# Patient Record
Sex: FEMALE | Race: WHITE | Employment: FULL TIME | URBAN - METROPOLITAN AREA
[De-identification: names, ages, dates, MRNs, and addresses within clinical notes are randomized per-mention and may not be internally consistent; named-entity substitution may affect disease eponyms.]

---

## 2017-01-09 ENCOUNTER — TELEPHONE (OUTPATIENT)
Dept: FAMILY MEDICINE CLINIC | Age: 52
End: 2017-01-09

## 2017-01-09 RX ORDER — DIAZEPAM 5 MG/1
5 TABLET ORAL EVERY 6 HOURS PRN
Qty: 30 TABLET | Refills: 1 | Status: SHIPPED | OUTPATIENT
Start: 2017-01-09 | End: 2017-04-11 | Stop reason: SDUPTHER

## 2017-01-24 RX ORDER — HYDROCODONE BITARTRATE AND ACETAMINOPHEN 5; 325 MG/1; MG/1
TABLET ORAL
Qty: 90 TABLET | Refills: 0 | Status: SHIPPED | OUTPATIENT
Start: 2017-01-24 | End: 2017-02-27 | Stop reason: SDUPTHER

## 2017-02-06 RX ORDER — CYCLOBENZAPRINE HCL 10 MG
TABLET ORAL
Qty: 270 TABLET | Refills: 2 | Status: SHIPPED | OUTPATIENT
Start: 2017-02-06 | End: 2017-07-31 | Stop reason: SDUPTHER

## 2017-02-27 RX ORDER — HYDROCODONE BITARTRATE AND ACETAMINOPHEN 5; 325 MG/1; MG/1
TABLET ORAL
Qty: 90 TABLET | Refills: 0 | Status: SHIPPED | OUTPATIENT
Start: 2017-02-27 | End: 2017-03-23 | Stop reason: SDUPTHER

## 2017-03-09 ENCOUNTER — OFFICE VISIT (OUTPATIENT)
Dept: FAMILY MEDICINE CLINIC | Age: 52
End: 2017-03-09

## 2017-03-09 VITALS
RESPIRATION RATE: 20 BRPM | DIASTOLIC BLOOD PRESSURE: 82 MMHG | HEART RATE: 88 BPM | TEMPERATURE: 97.6 F | HEIGHT: 62 IN | SYSTOLIC BLOOD PRESSURE: 128 MMHG

## 2017-03-09 DIAGNOSIS — Z12.31 VISIT FOR SCREENING MAMMOGRAM: ICD-10-CM

## 2017-03-09 DIAGNOSIS — Z12.11 COLON CANCER SCREENING: ICD-10-CM

## 2017-03-09 DIAGNOSIS — Z78.0 POST-MENOPAUSAL: ICD-10-CM

## 2017-03-09 DIAGNOSIS — Z51.81 MEDICATION MONITORING ENCOUNTER: ICD-10-CM

## 2017-03-09 DIAGNOSIS — M25.562 LEFT KNEE PAIN, UNSPECIFIED CHRONICITY: Primary | ICD-10-CM

## 2017-03-09 PROCEDURE — 99213 OFFICE O/P EST LOW 20 MIN: CPT | Performed by: FAMILY MEDICINE

## 2017-03-09 ASSESSMENT — ENCOUNTER SYMPTOMS
SINUS PRESSURE: 0
SHORTNESS OF BREATH: 0
EYE ITCHING: 0
CONSTIPATION: 0
SORE THROAT: 0
DIARRHEA: 0
EYE DISCHARGE: 0
COUGH: 0
ABDOMINAL PAIN: 0

## 2017-03-09 ASSESSMENT — PATIENT HEALTH QUESTIONNAIRE - PHQ9
1. LITTLE INTEREST OR PLEASURE IN DOING THINGS: 0
2. FEELING DOWN, DEPRESSED OR HOPELESS: 0
SUM OF ALL RESPONSES TO PHQ QUESTIONS 1-9: 0
SUM OF ALL RESPONSES TO PHQ9 QUESTIONS 1 & 2: 0

## 2017-03-20 DIAGNOSIS — Z51.81 MEDICATION MONITORING ENCOUNTER: ICD-10-CM

## 2017-03-21 ENCOUNTER — TELEPHONE (OUTPATIENT)
Dept: FAMILY MEDICINE CLINIC | Age: 52
End: 2017-03-21

## 2017-03-23 RX ORDER — HYDROCODONE BITARTRATE AND ACETAMINOPHEN 5; 325 MG/1; MG/1
TABLET ORAL
Qty: 90 TABLET | Refills: 0 | Status: SHIPPED | OUTPATIENT
Start: 2017-03-23 | End: 2017-04-24 | Stop reason: SDUPTHER

## 2017-04-11 RX ORDER — DIAZEPAM 5 MG/1
5 TABLET ORAL EVERY 6 HOURS PRN
Qty: 30 TABLET | Refills: 1 | OUTPATIENT
Start: 2017-04-11 | End: 2017-06-15 | Stop reason: SDUPTHER

## 2017-04-24 RX ORDER — HYDROCODONE BITARTRATE AND ACETAMINOPHEN 5; 325 MG/1; MG/1
TABLET ORAL
Qty: 90 TABLET | Refills: 0 | Status: SHIPPED | OUTPATIENT
Start: 2017-04-24 | End: 2017-05-22 | Stop reason: SDUPTHER

## 2017-05-22 RX ORDER — HYDROCODONE BITARTRATE AND ACETAMINOPHEN 5; 325 MG/1; MG/1
TABLET ORAL
Qty: 90 TABLET | Refills: 0 | Status: SHIPPED | OUTPATIENT
Start: 2017-05-22 | End: 2017-06-19 | Stop reason: SDUPTHER

## 2017-06-15 ENCOUNTER — TELEPHONE (OUTPATIENT)
Dept: FAMILY MEDICINE CLINIC | Age: 52
End: 2017-06-15

## 2017-06-15 RX ORDER — DIAZEPAM 5 MG/1
5 TABLET ORAL EVERY 6 HOURS PRN
Qty: 15 TABLET | Refills: 0 | OUTPATIENT
Start: 2017-06-15 | End: 2017-06-25

## 2017-06-19 RX ORDER — HYDROCODONE BITARTRATE AND ACETAMINOPHEN 5; 325 MG/1; MG/1
TABLET ORAL
Qty: 90 TABLET | Refills: 0 | Status: SHIPPED | OUTPATIENT
Start: 2017-06-19 | End: 2017-07-17 | Stop reason: SDUPTHER

## 2017-06-29 ENCOUNTER — OFFICE VISIT (OUTPATIENT)
Dept: FAMILY MEDICINE CLINIC | Age: 52
End: 2017-06-29

## 2017-06-29 VITALS
TEMPERATURE: 97.5 F | HEART RATE: 110 BPM | HEIGHT: 62 IN | WEIGHT: 185 LBS | BODY MASS INDEX: 34.04 KG/M2 | SYSTOLIC BLOOD PRESSURE: 128 MMHG | DIASTOLIC BLOOD PRESSURE: 82 MMHG

## 2017-06-29 DIAGNOSIS — M25.562 LEFT KNEE PAIN, UNSPECIFIED CHRONICITY: Primary | ICD-10-CM

## 2017-06-29 DIAGNOSIS — F41.9 ANXIETY: ICD-10-CM

## 2017-06-29 DIAGNOSIS — Z51.81 MEDICATION MONITORING ENCOUNTER: ICD-10-CM

## 2017-06-29 LAB
AMPHETAMINE SCREEN, URINE: NORMAL
BARBITURATE SCREEN, URINE: NORMAL
BENZODIAZEPINE SCREEN, URINE: NORMAL
CANNABINOID SCREEN URINE: NORMAL
COCAINE METABOLITE, URINE: NORMAL
MDMA URINE: NORMAL
METHADONE SCREEN, URINE: NORMAL
METHAMPHETAMINE, URINE: NORMAL
OPIATES, URINE: NORMAL
OXYCODONE: NORMAL
PCP: NORMAL
PHENCYCLIDINE, URINE: NORMAL
PROPOXYPHENE, URINE: NORMAL
TRICYCLIC ANTIDEPRESSANTS, UR: NORMAL

## 2017-06-29 PROCEDURE — 99213 OFFICE O/P EST LOW 20 MIN: CPT | Performed by: FAMILY MEDICINE

## 2017-06-29 RX ORDER — DIAZEPAM 5 MG/1
5 TABLET ORAL
Refills: 0 | COMMUNITY
Start: 2017-06-16 | End: 2018-07-19 | Stop reason: ALTCHOICE

## 2017-06-29 ASSESSMENT — ENCOUNTER SYMPTOMS
SHORTNESS OF BREATH: 0
CONSTIPATION: 0
SINUS PRESSURE: 0
EYE ITCHING: 0
SORE THROAT: 0
ABDOMINAL PAIN: 0
DIARRHEA: 0
EYE DISCHARGE: 0
COUGH: 0

## 2017-07-06 RX ORDER — DIAZEPAM 5 MG/1
5 TABLET ORAL EVERY 6 HOURS PRN
Qty: 30 TABLET | Refills: 0 | OUTPATIENT
Start: 2017-07-06 | End: 2017-07-16

## 2017-07-07 ENCOUNTER — TELEPHONE (OUTPATIENT)
Dept: FAMILY MEDICINE CLINIC | Age: 52
End: 2017-07-07

## 2017-07-07 DIAGNOSIS — Z51.81 THERAPEUTIC DRUG MONITORING: Primary | ICD-10-CM

## 2017-07-07 DIAGNOSIS — F41.9 ANXIETY: ICD-10-CM

## 2017-07-17 RX ORDER — HYDROCODONE BITARTRATE AND ACETAMINOPHEN 5; 325 MG/1; MG/1
TABLET ORAL
Qty: 90 TABLET | Refills: 0 | Status: SHIPPED | OUTPATIENT
Start: 2017-07-17 | End: 2017-08-14 | Stop reason: SDUPTHER

## 2017-08-01 RX ORDER — CYCLOBENZAPRINE HCL 10 MG
TABLET ORAL
Qty: 270 TABLET | Refills: 0 | Status: SHIPPED | OUTPATIENT
Start: 2017-08-01 | End: 2017-11-06 | Stop reason: SDUPTHER

## 2017-08-14 RX ORDER — HYDROCODONE BITARTRATE AND ACETAMINOPHEN 5; 325 MG/1; MG/1
TABLET ORAL
Qty: 90 TABLET | Refills: 0 | Status: SHIPPED | OUTPATIENT
Start: 2017-08-14 | End: 2017-09-11 | Stop reason: SDUPTHER

## 2017-09-11 RX ORDER — HYDROCODONE BITARTRATE AND ACETAMINOPHEN 5; 325 MG/1; MG/1
TABLET ORAL
Qty: 90 TABLET | Refills: 0 | Status: SHIPPED | OUTPATIENT
Start: 2017-09-11 | End: 2017-10-11 | Stop reason: SDUPTHER

## 2017-10-11 ENCOUNTER — TELEPHONE (OUTPATIENT)
Dept: FAMILY MEDICINE CLINIC | Age: 52
End: 2017-10-11

## 2017-10-11 RX ORDER — HYDROCODONE BITARTRATE AND ACETAMINOPHEN 5; 325 MG/1; MG/1
TABLET ORAL
Qty: 24 TABLET | Refills: 0 | Status: SHIPPED | OUTPATIENT
Start: 2017-10-11 | End: 2017-10-19 | Stop reason: SDUPTHER

## 2017-10-19 ENCOUNTER — OFFICE VISIT (OUTPATIENT)
Dept: FAMILY MEDICINE CLINIC | Age: 52
End: 2017-10-19

## 2017-10-19 VITALS
HEIGHT: 61 IN | BODY MASS INDEX: 34.93 KG/M2 | SYSTOLIC BLOOD PRESSURE: 128 MMHG | DIASTOLIC BLOOD PRESSURE: 80 MMHG | RESPIRATION RATE: 16 BRPM | TEMPERATURE: 97.9 F | HEART RATE: 76 BPM | WEIGHT: 185 LBS

## 2017-10-19 DIAGNOSIS — M25.562 LEFT KNEE PAIN, UNSPECIFIED CHRONICITY: Primary | ICD-10-CM

## 2017-10-19 DIAGNOSIS — Z23 NEED FOR INFLUENZA VACCINATION: ICD-10-CM

## 2017-10-19 PROCEDURE — 90688 IIV4 VACCINE SPLT 0.5 ML IM: CPT | Performed by: FAMILY MEDICINE

## 2017-10-19 PROCEDURE — 99213 OFFICE O/P EST LOW 20 MIN: CPT | Performed by: FAMILY MEDICINE

## 2017-10-19 PROCEDURE — 90471 IMMUNIZATION ADMIN: CPT | Performed by: FAMILY MEDICINE

## 2017-10-19 RX ORDER — HYDROCODONE BITARTRATE AND ACETAMINOPHEN 5; 325 MG/1; MG/1
TABLET ORAL
Qty: 90 TABLET | Refills: 0 | Status: SHIPPED | OUTPATIENT
Start: 2017-10-19 | End: 2017-11-16 | Stop reason: SDUPTHER

## 2017-10-19 ASSESSMENT — ENCOUNTER SYMPTOMS
SINUS PRESSURE: 0
DIARRHEA: 0
BLOOD IN STOOL: 0
ABDOMINAL PAIN: 0
CONSTIPATION: 0
COUGH: 0
RHINORRHEA: 0
BACK PAIN: 0
WHEEZING: 0
SHORTNESS OF BREATH: 0
CHEST TIGHTNESS: 0

## 2017-10-19 NOTE — PROGRESS NOTES
Prescriptions   Medication Sig Dispense Refill    HYDROcodone-acetaminophen (NORCO) 5-325 MG per tablet 1 tablet every 8 hours as needed for knee pain. Earliest Fill Date: 10/11/17 24 tablet 0    cyclobenzaprine (FLEXERIL) 10 MG tablet TAKE 1 TABLET BY MOUTH 3 TIMES DAILY AS NEEDED FOR MUSCLE SPASMS 270 tablet 0    diazepam (VALIUM) 5 MG tablet Take 5 mg by mouth  0    fluticasone (FLONASE) 50 MCG/ACT nasal spray 1 spray by Nasal route daily 3 Bottle 2    albuterol sulfate  (90 BASE) MCG/ACT inhaler Inhale 2 puffs into the lungs every 4 hours as needed for Wheezing 3 Inhaler 2    budesonide-formoterol (SYMBICORT) 80-4.5 MCG/ACT AERO Inhale 2 puffs into the lungs 2 times daily 3 Inhaler 2    Multiple Vitamin TABS Take 1 tablet by mouth daily. No current facility-administered medications for this visit. PMH, Surgical Hx, Family Hx, and Social Hx reviewed and updated. Health Maintenance reviewed. Objective    Vitals:    10/19/17 1658   BP: 128/80   Pulse: 76   Resp: 16   Temp: 97.9 °F (36.6 °C)   TempSrc: Temporal   Weight: 185 lb (83.9 kg)   Height: 5' 1\" (1.549 m)       Physical Exam   Constitutional: She is oriented to person, place, and time. She appears well-developed and well-nourished. HENT:   Head: Normocephalic. Mouth/Throat: Oropharynx is clear and moist.   Eyes: Pupils are equal, round, and reactive to light. Neck: Normal range of motion. Neck supple. No thyromegaly present. Cardiovascular: Normal rate and intact distal pulses. Exam reveals no gallop and no friction rub. No murmur heard. Pulmonary/Chest: Effort normal and breath sounds normal.   Abdominal: Soft. Bowel sounds are normal.   Musculoskeletal: Normal range of motion. Neurological: She is alert and oriented to person, place, and time. Skin: Skin is warm and dry. Psychiatric: She has a normal mood and affect.  Her behavior is normal.   Started a new job , wanted to try to get the knee replacement done for the end of the year but now may have to wait to Spring so follows up with her orthopedist in a month's period also requests flu shot today    Assessment & Plan   1. Left knee pain, unspecified chronicity  HYDROcodone-acetaminophen (NORCO) 5-325 MG per tablet   2. Need for influenza vaccination  INFLUENZA, QUADV, 3 YRS AND OLDER, IM, MDV, 0.5ML (FLUZONE QUADV)     Orders Placed This Encounter   Procedures    INFLUENZA, QUADV, 3 YRS AND OLDER, IM, MDV, 0.5ML (FLUZONE QUADV)     Orders Placed This Encounter   Medications    HYDROcodone-acetaminophen (NORCO) 5-325 MG per tablet     Si tablet every 8 hours as needed for knee pain. Dispense:  90 tablet     Refill:  0     Medications Discontinued During This Encounter   Medication Reason    HYDROcodone-acetaminophen ([address]) 5-325 MG per tablet Reorder     No Follow-up on file.         Controlled Substances Monitoring:          Santosh King MD

## 2017-11-06 RX ORDER — CYCLOBENZAPRINE HCL 10 MG
TABLET ORAL
Qty: 270 TABLET | Refills: 0 | Status: SHIPPED | OUTPATIENT
Start: 2017-11-06 | End: 2018-02-04 | Stop reason: SDUPTHER

## 2017-11-09 RX ORDER — FLUTICASONE PROPIONATE 50 MCG
1 SPRAY, SUSPENSION (ML) NASAL DAILY
Qty: 3 BOTTLE | Refills: 2 | Status: SHIPPED | OUTPATIENT
Start: 2017-11-09 | End: 2018-11-09 | Stop reason: SDUPTHER

## 2017-11-09 NOTE — TELEPHONE ENCOUNTER
From: Julián Portillo  Sent: 11/8/2017 8:10 PM EST  Subject: Medication Renewal Request    Jonathon Lawson would like a refill of the following medications:  fluticasone (FLONASE) 50 MCG/ACT nasal spray Unknown MD Urszula]    Preferred pharmacy: [address] [facility] [phone]    Comment:

## 2017-11-16 DIAGNOSIS — M25.562 LEFT KNEE PAIN, UNSPECIFIED CHRONICITY: ICD-10-CM

## 2017-11-16 RX ORDER — HYDROCODONE BITARTRATE AND ACETAMINOPHEN 5; 325 MG/1; MG/1
TABLET ORAL
Qty: 90 TABLET | Refills: 0 | Status: SHIPPED | OUTPATIENT
Start: 2017-11-16 | End: 2017-12-14 | Stop reason: SDUPTHER

## 2017-11-16 NOTE — TELEPHONE ENCOUNTER
From: Timothy Tejada  Sent: 11/15/2017 8:55 PM EST  Subject: Medication Renewal Request    Marty [facility] would like a refill of the following medications:  HYDROcodone-acetaminophen (Elyn Barley) 5-325 MG per tablet Cheryle Mis, MD]    Preferred pharmacy: [place] [address], [address] [facility] [phone]    Comment:  Hi, is there any possible way that this can be called in? I have a crazy busy schedule this week and won't be getting back to this side of town until late in the evening. It would be most appreciated. Thank you. Have a Happy Thanksgiving.

## 2017-12-14 DIAGNOSIS — M25.562 LEFT KNEE PAIN, UNSPECIFIED CHRONICITY: ICD-10-CM

## 2017-12-14 RX ORDER — HYDROCODONE BITARTRATE AND ACETAMINOPHEN 5; 325 MG/1; MG/1
TABLET ORAL
Qty: 90 TABLET | Refills: 0 | Status: SHIPPED | OUTPATIENT
Start: 2017-12-14 | End: 2018-01-10 | Stop reason: SDUPTHER

## 2017-12-28 ENCOUNTER — TELEPHONE (OUTPATIENT)
Dept: FAMILY MEDICINE CLINIC | Age: 52
End: 2017-12-28

## 2017-12-28 ENCOUNTER — PATIENT MESSAGE (OUTPATIENT)
Dept: FAMILY MEDICINE CLINIC | Age: 52
End: 2017-12-28

## 2017-12-28 RX ORDER — BENZONATATE 200 MG/1
200 CAPSULE ORAL 3 TIMES DAILY PRN
Qty: 30 CAPSULE | Refills: 1 | Status: SHIPPED | OUTPATIENT
Start: 2017-12-28 | End: 2017-12-29 | Stop reason: SDUPTHER

## 2017-12-28 RX ORDER — AZITHROMYCIN 250 MG/1
TABLET, FILM COATED ORAL
Qty: 1 PACKET | Refills: 0 | Status: SHIPPED | OUTPATIENT
Start: 2017-12-28 | End: 2017-12-29 | Stop reason: SDUPTHER

## 2017-12-29 ENCOUNTER — PATIENT MESSAGE (OUTPATIENT)
Dept: FAMILY MEDICINE CLINIC | Age: 52
End: 2017-12-29

## 2017-12-29 RX ORDER — AZITHROMYCIN 250 MG/1
TABLET, FILM COATED ORAL
Qty: 1 PACKET | Refills: 0 | Status: SHIPPED | OUTPATIENT
Start: 2017-12-29 | End: 2018-05-18 | Stop reason: SDUPTHER

## 2017-12-29 RX ORDER — BENZONATATE 200 MG/1
200 CAPSULE ORAL 3 TIMES DAILY PRN
Qty: 30 CAPSULE | Refills: 1 | Status: SHIPPED | OUTPATIENT
Start: 2017-12-29 | End: 2018-01-05

## 2017-12-29 NOTE — TELEPHONE ENCOUNTER
From: Griffin Nolasco  To: Rashad Chamberlain MD  Sent: 12/29/2017 7:25 AM EST  Subject: Prescription Question    I checked online this morning for the prescription at [facility] and there still is nothing. Can you tell me if he is was calling in something for my cough as well? I was up most of the night coughing, I am afraid it is going to start affecting my asthma. Thanks.  ----- Message -----  From: Alicia Aguayo  Sent: 12/28/2017 5:34 PM EST  To: Russ Hardy Faith Beverage  Subject: RE: Prescription Question  Letting you know Dr. Tim No your prescription to your pharmacy     ----- Message -----   From: Russ Funk. Faith Beverage   Sent: 12/28/2017 2:56 PM EST   To: Rashad Chamberlain MD  Subject: Prescription Question    Hi, I have a bad case of laryngitis, is there an antibiotic the doctor can prescribe? Also I am starting to get a cough, mostly during the night and it is getting bothersome, I know Doctor prescribed me a cough medicine a while back, Wondered if I could get that again.  Thanks

## 2018-01-10 DIAGNOSIS — M25.562 LEFT KNEE PAIN, UNSPECIFIED CHRONICITY: ICD-10-CM

## 2018-01-10 RX ORDER — HYDROCODONE BITARTRATE AND ACETAMINOPHEN 5; 325 MG/1; MG/1
TABLET ORAL
Qty: 90 TABLET | Refills: 0 | Status: SHIPPED | OUTPATIENT
Start: 2018-01-10 | End: 2018-02-08 | Stop reason: SDUPTHER

## 2018-01-26 ENCOUNTER — TELEPHONE (OUTPATIENT)
Dept: FAMILY MEDICINE CLINIC | Age: 53
End: 2018-01-26

## 2018-02-05 RX ORDER — CYCLOBENZAPRINE HCL 10 MG
TABLET ORAL
Qty: 270 TABLET | Refills: 0 | Status: SHIPPED | OUTPATIENT
Start: 2018-02-05 | End: 2018-10-28 | Stop reason: SDUPTHER

## 2018-02-08 DIAGNOSIS — M25.562 LEFT KNEE PAIN, UNSPECIFIED CHRONICITY: ICD-10-CM

## 2018-02-08 RX ORDER — HYDROCODONE BITARTRATE AND ACETAMINOPHEN 5; 325 MG/1; MG/1
TABLET ORAL
Qty: 90 TABLET | Refills: 0 | Status: SHIPPED | OUTPATIENT
Start: 2018-02-08 | End: 2018-03-01 | Stop reason: SDUPTHER

## 2018-02-08 NOTE — TELEPHONE ENCOUNTER
Last seen 10/2017  Last filled 01/11/18  Last drug screen   Has appt scheduled for 03/01 is ok for one more refill

## 2018-03-01 ENCOUNTER — OFFICE VISIT (OUTPATIENT)
Dept: FAMILY MEDICINE CLINIC | Age: 53
End: 2018-03-01
Payer: COMMERCIAL

## 2018-03-01 VITALS
WEIGHT: 187 LBS | TEMPERATURE: 97.4 F | BODY MASS INDEX: 34.41 KG/M2 | DIASTOLIC BLOOD PRESSURE: 82 MMHG | HEIGHT: 62 IN | SYSTOLIC BLOOD PRESSURE: 138 MMHG | HEART RATE: 112 BPM | RESPIRATION RATE: 20 BRPM

## 2018-03-01 DIAGNOSIS — M25.562 LEFT KNEE PAIN, UNSPECIFIED CHRONICITY: Primary | ICD-10-CM

## 2018-03-01 DIAGNOSIS — Z12.31 VISIT FOR SCREENING MAMMOGRAM: ICD-10-CM

## 2018-03-01 PROCEDURE — 99213 OFFICE O/P EST LOW 20 MIN: CPT | Performed by: FAMILY MEDICINE

## 2018-03-01 RX ORDER — HYDROCODONE BITARTRATE AND ACETAMINOPHEN 5; 325 MG/1; MG/1
TABLET ORAL
Qty: 90 TABLET | Refills: 0 | Status: SHIPPED | OUTPATIENT
Start: 2018-03-01 | End: 2018-03-01 | Stop reason: SDUPTHER

## 2018-03-01 RX ORDER — HYDROCODONE BITARTRATE AND ACETAMINOPHEN 5; 325 MG/1; MG/1
TABLET ORAL
Qty: 90 TABLET | Refills: 0 | Status: SHIPPED | OUTPATIENT
Start: 2018-03-01 | End: 2018-04-04 | Stop reason: SDUPTHER

## 2018-03-01 ASSESSMENT — ENCOUNTER SYMPTOMS
ABDOMINAL PAIN: 0
SHORTNESS OF BREATH: 0
SORE THROAT: 0
DIARRHEA: 0
EYE ITCHING: 0
EYE DISCHARGE: 0
COUGH: 0
SINUS PRESSURE: 0
CONSTIPATION: 0

## 2018-03-01 NOTE — LETTER
stimulants, such as caffeine pills or energy drinks, certain weight loss supplements and oral decongestants. Dependence withdrawal symptoms may include depressed mood, loss of interest, suicidal thoughts, anxiety, fatigue, appetite changes and agitation. Testosterone replacement therapy:  Potential side effects include increased risk of stroke and heart attack, blood clots, increased blood pressure, increased cholesterol, enlarged prostate, sleep apnea, irritability/aggression and other mood disorders, and decreased fertility. Other:     1. I understand that I have the following responsibilities:  · I will take medications at the dose and frequency prescribed. · I will not increase or change how I take my medications without the approval of the health care provider who signs this Medication Agreement. · I will arrange for refills at the prescribed interval ONLY during regular office hours. I will not ask for refills earlier than agreed, after-hours, on holidays or on weekends. · I will obtain all refills for these medications at  ·  ____________[facility] [address] [place] [address], [address] [place] [address] [phone]________________________  pharmacy (phone number  ·  ________________________), with full consent for my provider and pharmacist to exchange information in writing or verbally. · I will not request any pain medications or controlled substances from other providers and will inform this provider of all other medications I am taking. · I will inform my other health care providers that I am taking these medications and of the existence of this [facility]. In the event of an emergency, I will provide the same information to the emergency department providers. · I will protect my prescriptions and medications. I understand that lost or misplaced prescriptions will not be replaced. · I develop rapid tolerance or loss of improvement, as described in my treatment plan. · I develop significant side effects from the medication. · My behavior is inconsistent with the responsibilities outlined above, which may also result in my being prevented from receiving further care from this office. · Other:____________________________________________________________________    AGREEMENT:    I have read the above and have had all of my questions answered. For chronic disease management, I know that my symptoms can be managed with many types of treatments. A chronic medication trial may be part of my treatment, but I must be an active participant in my care. Medication therapy is only one part of my symptom management plan. In some cases, there may be limited scientific evidence to support the chronic use of certain medications to improve symptoms and daily function. Furthermore, in certain circumstances, there may be scientific information that suggests that use of chronic controlled substances may actually worsen my symptoms and increase my risk of unintentional death directly related to this medication therapy. I know that if my provider feels my risk from controlled medications is greater than my benefit, I will have my controlled substance medication(s) compassionately lowered or removed altogether. I agree to a controlled substance medication trial.      I further agree to allow this office to contact family or friends if there are concerns about my safety and use of the controlled medications. I have agreed to use the following medications above as instructed by my physician and as stated in this [place] [address].      Patient Signature:  ______________________  [facility]:6/8/1184 or _____________    Provider Signature:______________________  [facility]:1/5/2934 or _____________

## 2018-03-01 NOTE — PROGRESS NOTES
Subjective  Kaleva Patience, 45 y.o. female presents today with:  Chief Complaint   Patient presents with    Knee Pain     follow up left knee pain. She is taking Norco- no negative side effects reported. She is requesting a refill on this medication           HPI    Patient in for follow-up chronic left knee pain needs pain medication refill. Pt is stable on current pain control treatment plan with no issues or side effects. Pt understands the risks associated with chronic opioid use and possibility of dependence. Pt understands to use pain medication only as directed and minimize it's use when possible  Current treatment plan provides adaquate pain management control in order to maintain a stable functional daily life. Pt has failed rest, ice, heat, acetaminophen- and Ibuprofen, muscle relaxant- , oral steroid- Prednisone , topical agent- Icy hot patches and Wan atkinson, physical therapy, home exercises      No other questions and or concerns for today's visit      Review of Systems   Constitutional: Negative for appetite change, fatigue and fever. HENT: Negative for congestion, ear pain, sinus pressure and sore throat. Eyes: Negative for discharge and itching. Respiratory: Negative for cough and shortness of breath. Cardiovascular: Negative for chest pain and palpitations. Gastrointestinal: Negative for abdominal pain, constipation and diarrhea. Endocrine: Negative for polydipsia. Genitourinary: Negative for difficulty urinating. Musculoskeletal: Positive for arthralgias and myalgias. Negative for gait problem. Skin: Negative. Neurological: Negative for dizziness. Hematological: Negative. Psychiatric/Behavioral: Negative.           Past Medical History:   Diagnosis Date    Anxiety 6/29/2017    Asthma 1/26/2015    Hx of blood clots 1/26/2015    Left knee pain- treated by Orthopedic Dr. Radha Devlin 1/26/2015    Post-menopausal 1/26/2015    SVT (supraventricular tachycardia) ([address] 1/26/2015     History reviewed. No pertinent surgical history. Social History     Social History    Marital status:      Spouse name: Allen Siddiqi    Number of children: 1    Years of education: N/A     Occupational History   [employer]     Social History Main Topics    Smoking status: Never Smoker    Smokeless tobacco: Never Used    Alcohol use No    Drug use: No    Sexual activity: Not on file     Other Topics Concern    Not on file     Social History Narrative    No narrative on file     History reviewed. No pertinent family history. Allergies   Allergen Reactions    Amoxicillin Rash     Current Outpatient Prescriptions   Medication Sig Dispense Refill    HYDROcodone-acetaminophen (NORCO) 5-325 MG per tablet 1 tablet every 8 hours as needed for knee pain. Earliest Fill Date: 2/8/18 90 tablet 0    cyclobenzaprine (FLEXERIL) 10 MG tablet TAKE 1 TABLET BY MOUTH THREE TIMES DAILY AS NEEDED FOR MUSCLE SPASMS 270 tablet 0    fluticasone (FLONASE) 50 MCG/ACT nasal spray 1 spray by Nasal route daily 3 Bottle 2    diazepam (VALIUM) 5 MG tablet Take 5 mg by mouth  0    albuterol sulfate  (90 BASE) MCG/ACT inhaler Inhale 2 puffs into the lungs every 4 hours as needed for Wheezing 3 Inhaler 2    budesonide-formoterol (SYMBICORT) 80-4.5 MCG/ACT AERO Inhale 2 puffs into the lungs 2 times daily 3 Inhaler 2    Multiple Vitamin TABS Take 1 tablet by mouth daily. No current facility-administered medications for this visit. PMH, Surgical Hx, Family Hx, and Social Hx reviewed and updated. Health Maintenance reviewed. Objective    Vitals:    03/01/18 1555   BP: 138/82   Pulse: 112   Resp: 20   Temp: 97.4 °F (36.3 °C)   TempSrc: Temporal   Weight: 187 lb (84.8 kg)   Height: 5' 1.5\" (1.562 m)       Physical Exam   Constitutional: She is oriented to person, place, and time. She appears well-developed and well-nourished.    HENT:   Head:

## 2018-03-07 ENCOUNTER — TELEPHONE (OUTPATIENT)
Dept: FAMILY MEDICINE CLINIC | Age: 53
End: 2018-03-07

## 2018-04-04 DIAGNOSIS — M25.562 LEFT KNEE PAIN, UNSPECIFIED CHRONICITY: ICD-10-CM

## 2018-04-04 RX ORDER — HYDROCODONE BITARTRATE AND ACETAMINOPHEN 5; 325 MG/1; MG/1
TABLET ORAL
Qty: 90 TABLET | Refills: 0 | Status: SHIPPED | OUTPATIENT
Start: 2018-04-04 | End: 2018-05-02 | Stop reason: SDUPTHER

## 2018-05-02 DIAGNOSIS — M25.562 LEFT KNEE PAIN, UNSPECIFIED CHRONICITY: ICD-10-CM

## 2018-05-02 RX ORDER — HYDROCODONE BITARTRATE AND ACETAMINOPHEN 5; 325 MG/1; MG/1
TABLET ORAL
Qty: 90 TABLET | Refills: 0 | Status: SHIPPED | OUTPATIENT
Start: 2018-05-02 | End: 2018-05-29 | Stop reason: SDUPTHER

## 2018-05-18 ENCOUNTER — TELEPHONE (OUTPATIENT)
Dept: FAMILY MEDICINE CLINIC | Age: 53
End: 2018-05-18

## 2018-05-18 RX ORDER — AZITHROMYCIN 250 MG/1
TABLET, FILM COATED ORAL
Qty: 1 PACKET | Refills: 0 | Status: SHIPPED | OUTPATIENT
Start: 2018-05-18 | End: 2018-05-28

## 2018-05-29 ENCOUNTER — TELEPHONE (OUTPATIENT)
Dept: FAMILY MEDICINE CLINIC | Age: 53
End: 2018-05-29

## 2018-05-29 DIAGNOSIS — M25.562 LEFT KNEE PAIN, UNSPECIFIED CHRONICITY: ICD-10-CM

## 2018-05-29 RX ORDER — HYDROCODONE BITARTRATE AND ACETAMINOPHEN 5; 325 MG/1; MG/1
TABLET ORAL
Qty: 90 TABLET | Refills: 0 | Status: SHIPPED | OUTPATIENT
Start: 2018-05-29 | End: 2018-06-26 | Stop reason: SDUPTHER

## 2018-06-26 ENCOUNTER — TELEPHONE (OUTPATIENT)
Dept: FAMILY MEDICINE CLINIC | Age: 53
End: 2018-06-26

## 2018-06-26 DIAGNOSIS — M25.562 LEFT KNEE PAIN, UNSPECIFIED CHRONICITY: ICD-10-CM

## 2018-06-26 RX ORDER — HYDROCODONE BITARTRATE AND ACETAMINOPHEN 5; 325 MG/1; MG/1
TABLET ORAL
Qty: 90 TABLET | Refills: 0 | Status: SHIPPED | OUTPATIENT
Start: 2018-06-26 | End: 2018-06-26 | Stop reason: SDUPTHER

## 2018-06-26 RX ORDER — HYDROCODONE BITARTRATE AND ACETAMINOPHEN 5; 325 MG/1; MG/1
TABLET ORAL
Qty: 90 TABLET | Refills: 0 | Status: SHIPPED | OUTPATIENT
Start: 2018-06-26 | End: 2018-07-19

## 2018-07-19 ENCOUNTER — OFFICE VISIT (OUTPATIENT)
Dept: FAMILY MEDICINE CLINIC | Age: 53
End: 2018-07-19
Payer: COMMERCIAL

## 2018-07-19 VITALS
DIASTOLIC BLOOD PRESSURE: 86 MMHG | TEMPERATURE: 98.6 F | RESPIRATION RATE: 16 BRPM | HEART RATE: 110 BPM | SYSTOLIC BLOOD PRESSURE: 144 MMHG | HEIGHT: 62 IN

## 2018-07-19 DIAGNOSIS — I47.1 SVT (SUPRAVENTRICULAR TACHYCARDIA) (HCC): ICD-10-CM

## 2018-07-19 DIAGNOSIS — M25.562 LEFT KNEE PAIN, UNSPECIFIED CHRONICITY: Primary | ICD-10-CM

## 2018-07-19 DIAGNOSIS — Z00.00 ROUTINE GENERAL MEDICAL EXAMINATION AT A HEALTH CARE FACILITY: ICD-10-CM

## 2018-07-19 DIAGNOSIS — F11.90 CHRONIC, CONTINUOUS USE OF OPIOIDS: ICD-10-CM

## 2018-07-19 DIAGNOSIS — F41.9 ANXIETY: ICD-10-CM

## 2018-07-19 PROCEDURE — 99214 OFFICE O/P EST MOD 30 MIN: CPT | Performed by: FAMILY MEDICINE

## 2018-07-19 RX ORDER — HYDROCODONE BITARTRATE AND ACETAMINOPHEN 7.5; 325 MG/1; MG/1
1 TABLET ORAL EVERY 8 HOURS PRN
Qty: 90 TABLET | Refills: 0 | Status: SHIPPED | OUTPATIENT
Start: 2018-07-19 | End: 2018-08-16 | Stop reason: SDUPTHER

## 2018-07-19 RX ORDER — HYDROCODONE BITARTRATE AND ACETAMINOPHEN 5; 325 MG/1; MG/1
TABLET ORAL
Qty: 90 TABLET | Refills: 0 | Status: CANCELLED | OUTPATIENT
Start: 2018-07-19 | End: 2018-08-18

## 2018-07-19 ASSESSMENT — ENCOUNTER SYMPTOMS
ABDOMINAL PAIN: 0
CONSTIPATION: 0
EYES NEGATIVE: 1
DIARRHEA: 0
COUGH: 0
NAUSEA: 0
SHORTNESS OF BREATH: 0

## 2018-07-19 ASSESSMENT — PATIENT HEALTH QUESTIONNAIRE - PHQ9
SUM OF ALL RESPONSES TO PHQ9 QUESTIONS 1 & 2: 0
2. FEELING DOWN, DEPRESSED OR HOPELESS: 0
1. LITTLE INTEREST OR PLEASURE IN DOING THINGS: 0
SUM OF ALL RESPONSES TO PHQ QUESTIONS 1-9: 0

## 2018-07-19 NOTE — PROGRESS NOTES
Subjective  Parminder Zuniga, 45 y.o. female presents today with:  Chief Complaint   Patient presents with    Knee Pain     Patient presents today for a follow-up on Knee Pain. Is taking Norco. States she has no concerns with this medication. HPI    Patient in for follow-up chronic knee pain needs medication refill. No other questions and or concerns for today's visit      Review of Systems   Constitutional: Negative for activity change, appetite change, fatigue and fever. HENT: Negative for ear pain. Eyes: Negative. Respiratory: Negative for cough and shortness of breath. Cardiovascular: Negative for chest pain and palpitations. Gastrointestinal: Negative for abdominal pain, constipation, diarrhea and nausea. Genitourinary: Negative for dysuria and frequency. Neurological: Negative for dizziness and headaches. Past Medical History:   Diagnosis Date    Anxiety 6/29/2017    Asthma 1/26/2015    Hx of blood clots 1/26/2015    Left knee pain- treated by Orthopedic Dr. Yolanda Ramos 1/26/2015    Post-menopausal 1/26/2015    SVT ([address]raventricular tachy[facility]) [address] 1/26/2015     No past surgical history on file. Social History     Social History    Marital status:      Spouse name: Julieta Painting    Number of children: 1    Years of education: N/A     Occupational History   [employer]     Social History Main Topics    Smoking status: Never Smoker    Smokeless tobacco: Never Used    Alcohol use No    Drug use: No    Sexual activity: Not on file     Other Topics Concern    Not on file     Social History Narrative    No narrative on file     No family history on file. Allergies   Allergen Reactions    Amoxicillin Rash     Current Outpatient Prescriptions   Medication Sig Dispense Refill    HYDROcodone-acetaminophen (NORCO) 5-325 MG per tablet 1 tablet every 8 hours as needed for knee pain.  90 tablet 0    cyclobenzaprine (FLEXERIL) 10 MG tablet TAKE 1 TABLET BY MOUTH THREE TIMES DAILY AS NEEDED FOR MUSCLE SPASMS 270 tablet 0    fluticasone (FLONASE) 50 MCG/ACT nasal spray 1 spray by Nasal route daily 3 Bottle 2    albuterol sulfate  (90 BASE) MCG/ACT inhaler Inhale 2 puffs into the lungs every 4 hours as needed for Wheezing 3 Inhaler 2    budesonide-formoterol (SYMBICORT) 80-4.5 MCG/ACT AERO Inhale 2 puffs into the lungs 2 times daily 3 Inhaler 2    Multiple Vitamin TABS Take 1 tablet by mouth daily. No current facility-administered medications for this visit. PMH, Surgical Hx, Family Hx, and Social Hx reviewed and updated. Health Maintenance reviewed. Objective    Vitals:    07/19/18 1831   BP: 130/86   Pulse: 110   Resp: 16   Temp: 98.6 °F (37 °C)   TempSrc: Temporal   Height: 5' 1.5\" (1.562 m)       Physical Exam   Constitutional: She is oriented to person, place, and time. She appears well-developed and well-nourished. HENT:   Head: Normocephalic. Mouth/Throat: Oropharynx is clear and moist.   Eyes: Pupils are equal, round, and reactive to light. Neck: Normal range of motion. Neck supple. No thyromegaly present. Cardiovascular: Normal rate and intact distal pulses. Exam reveals no gallop and no friction rub. No murmur heard. Pulmonary/Chest: Effort normal and breath sounds normal.   Abdominal: Soft. Bowel sounds are normal.   Musculoskeletal: Normal range of motion. Neurological: She is alert and oriented to person, place, and time. Skin: Skin is warm and dry. Psychiatric: She has a normal mood and affect. Her behavior is normal.     Chronic left knee pain stable history of SVT and anxiety is uncontrolled. Needs a urine drug screen none today. We'll do that needed some blood drawn  Assessment & Plan    Diagnosis Orders   1. Left knee pain, unspecified chronicity  HYDROcodone-acetaminophen (NORCO) 7.5-325 MG per tablet   2. Anxiety     3.  SVT (supraventricular tachycardia) ([address]

## 2018-08-06 DIAGNOSIS — F11.90 CHRONIC, CONTINUOUS USE OF OPIOIDS: ICD-10-CM

## 2018-08-16 DIAGNOSIS — M25.562 LEFT KNEE PAIN, UNSPECIFIED CHRONICITY: ICD-10-CM

## 2018-08-16 RX ORDER — HYDROCODONE BITARTRATE AND ACETAMINOPHEN 7.5; 325 MG/1; MG/1
1 TABLET ORAL EVERY 8 HOURS PRN
Qty: 90 TABLET | Refills: 0 | Status: SHIPPED | OUTPATIENT
Start: 2018-08-16 | End: 2018-09-13 | Stop reason: SDUPTHER

## 2018-08-16 NOTE — TELEPHONE ENCOUNTER
Medication: Norco    Last Office Visit: 07/2018    Last filled: 07/19/18    Last Signed Contract: none    Last Utox: 07/2018    Last OARRS:

## 2018-09-13 DIAGNOSIS — M25.562 LEFT KNEE PAIN, UNSPECIFIED CHRONICITY: ICD-10-CM

## 2018-09-13 RX ORDER — BUDESONIDE AND FORMOTEROL FUMARATE DIHYDRATE 80; 4.5 UG/1; UG/1
2 AEROSOL RESPIRATORY (INHALATION) 2 TIMES DAILY
Qty: 3 INHALER | Refills: 2 | Status: SHIPPED | OUTPATIENT
Start: 2018-09-13 | End: 2019-03-15 | Stop reason: SDUPTHER

## 2018-09-13 RX ORDER — HYDROCODONE BITARTRATE AND ACETAMINOPHEN 7.5; 325 MG/1; MG/1
1 TABLET ORAL EVERY 8 HOURS PRN
Qty: 90 TABLET | Refills: 0 | Status: SHIPPED | OUTPATIENT
Start: 2018-09-13 | End: 2018-10-10 | Stop reason: SDUPTHER

## 2018-09-13 NOTE — TELEPHONE ENCOUNTER
From: Su Carrillo  Sent: 9/12/2018 7:19 PM EDT  Subject: Medication Renewal Request    Ramos Obi.  Andrés Freddie would like a refill of the following medications:     budesonide-formoterol (SYMBICORT) 80-4.5 MCG/ACT AERO Janell Hilliard MD]   Patient Comment: Please note we use [facility]     HYDROcodone-acetaminophen ([facility]) 7.5-325 MG per tablet Janell Hilliard MD]   Patient Comment: Please note we use [facility]    Preferred pharmacy: [facility] [address] [place] [address] [phone]

## 2018-10-10 DIAGNOSIS — M25.562 LEFT KNEE PAIN, UNSPECIFIED CHRONICITY: ICD-10-CM

## 2018-10-10 RX ORDER — HYDROCODONE BITARTRATE AND ACETAMINOPHEN 7.5; 325 MG/1; MG/1
1 TABLET ORAL EVERY 8 HOURS PRN
Qty: 90 TABLET | Refills: 0 | Status: SHIPPED | OUTPATIENT
Start: 2018-10-10 | End: 2018-11-01 | Stop reason: SDUPTHER

## 2018-10-10 NOTE — TELEPHONE ENCOUNTER
LOV: 7/19/18  Appt scheduled for: 11/1/18 at [address] [place] [address]:  Ashwini Tenorio.  Martha Coles would like a refill of the following medications:         HYDROcodone-acetaminophen (NORCO) 7.5-325 MG per tablet Rich Rivas MD]

## 2018-10-30 RX ORDER — CYCLOBENZAPRINE HCL 10 MG
TABLET ORAL
Qty: 270 TABLET | Refills: 1 | Status: SHIPPED | OUTPATIENT
Start: 2018-10-30

## 2018-11-01 ENCOUNTER — OFFICE VISIT (OUTPATIENT)
Dept: FAMILY MEDICINE CLINIC | Age: 53
End: 2018-11-01
Payer: COMMERCIAL

## 2018-11-01 VITALS
BODY MASS INDEX: 35.33 KG/M2 | HEIGHT: 61 IN | RESPIRATION RATE: 16 BRPM | TEMPERATURE: 98.3 F | SYSTOLIC BLOOD PRESSURE: 132 MMHG | HEART RATE: 72 BPM | DIASTOLIC BLOOD PRESSURE: 86 MMHG

## 2018-11-01 DIAGNOSIS — E78.5 HYPERLIPIDEMIA, UNSPECIFIED HYPERLIPIDEMIA TYPE: ICD-10-CM

## 2018-11-01 DIAGNOSIS — M25.562 LEFT KNEE PAIN, UNSPECIFIED CHRONICITY: Primary | ICD-10-CM

## 2018-11-01 DIAGNOSIS — Z12.11 COLON CANCER SCREENING: ICD-10-CM

## 2018-11-01 DIAGNOSIS — Z23 NEED FOR INFLUENZA VACCINATION: ICD-10-CM

## 2018-11-01 DIAGNOSIS — Z12.31 VISIT FOR SCREENING MAMMOGRAM: ICD-10-CM

## 2018-11-01 DIAGNOSIS — Z78.0 POSTMENOPAUSAL: ICD-10-CM

## 2018-11-01 PROCEDURE — 90688 IIV4 VACCINE SPLT 0.5 ML IM: CPT | Performed by: FAMILY MEDICINE

## 2018-11-01 PROCEDURE — 90471 IMMUNIZATION ADMIN: CPT | Performed by: FAMILY MEDICINE

## 2018-11-01 PROCEDURE — 99213 OFFICE O/P EST LOW 20 MIN: CPT | Performed by: FAMILY MEDICINE

## 2018-11-01 RX ORDER — HYDROCODONE BITARTRATE AND ACETAMINOPHEN 7.5; 325 MG/1; MG/1
1 TABLET ORAL EVERY 8 HOURS PRN
Qty: 90 TABLET | Refills: 0 | Status: SHIPPED | OUTPATIENT
Start: 2018-11-01 | End: 2018-12-05 | Stop reason: SDUPTHER

## 2018-11-01 ASSESSMENT — ENCOUNTER SYMPTOMS
EYE ITCHING: 0
CONSTIPATION: 0
ABDOMINAL PAIN: 0
SHORTNESS OF BREATH: 0
DIARRHEA: 0
COUGH: 0
SINUS PRESSURE: 0
SORE THROAT: 0
EYE DISCHARGE: 0

## 2018-11-01 NOTE — PROGRESS NOTES
behavior is normal.     Chronic pain in the right knee stable with medication evaluated for an eye exam was told she had calcium cholesterol deposits. She had a history of hyperlipidemia and benign medication in the past but nothing for number of years. oarrs report ran 11/1/18      Vaccine Information Sheet, \"Influenza - Inactivated\"  given to Madison Koehler, or parent/legal guardian of  Madison Koehler and verbalized understanding. Patient responses:    Have you ever had a reaction to a flu vaccine? No  Are you able to eat eggs without adverse effects? Yes  Do you have any current illness? No  Have you ever had [place] Syndrome? No    Flu vaccine given per order. Please see immunization tab.

## 2018-11-02 DIAGNOSIS — E78.5 HYPERLIPIDEMIA, UNSPECIFIED HYPERLIPIDEMIA TYPE: ICD-10-CM

## 2018-11-02 LAB
ALBUMIN SERPL-MCNC: 4.6 G/DL (ref 3.9–4.9)
ALP BLD-CCNC: 97 U/L (ref 40–130)
ALT SERPL-CCNC: 27 U/L (ref 0–33)
ANION GAP SERPL CALCULATED.3IONS-SCNC: 17 MEQ/L (ref 7–13)
AST SERPL-CCNC: 28 U/L (ref 0–35)
BASOPHILS ABSOLUTE: 0.1 K/UL (ref 0–0.2)
BASOPHILS RELATIVE PERCENT: 1 %
BILIRUB SERPL-MCNC: 0.5 MG/DL (ref 0–1.2)
BUN BLDV-MCNC: 11 MG/DL (ref 6–20)
CALCIUM SERPL-MCNC: 9.9 MG/DL (ref 8.6–10.2)
CHLORIDE BLD-SCNC: 101 MEQ/L (ref 98–107)
CHOLESTEROL, TOTAL: 230 MG/DL (ref 0–199)
CO2: 25 MEQ/L (ref 22–29)
CREAT SERPL-MCNC: 0.73 MG/DL (ref 0.5–0.9)
EOSINOPHILS ABSOLUTE: 0.4 K/UL (ref 0–0.7)
EOSINOPHILS RELATIVE PERCENT: 5.2 %
GFR AFRICAN AMERICAN: >60
GFR NON-AFRICAN AMERICAN: >60
GLOBULIN: 2.7 G/DL (ref 2.3–3.5)
GLUCOSE BLD-MCNC: 103 MG/DL (ref 74–109)
HCT VFR BLD CALC: 43.1 % (ref 37–47)
HDLC SERPL-MCNC: 73 MG/DL (ref 40–59)
HEMOGLOBIN: 14.8 G/DL (ref 12–16)
LDL CHOLESTEROL CALCULATED: 141 MG/DL (ref 0–129)
LYMPHOCYTES ABSOLUTE: 1.8 K/UL (ref 1–4.8)
LYMPHOCYTES RELATIVE PERCENT: 23.1 %
MCH RBC QN AUTO: 31.4 PG (ref 27–31.3)
MCHC RBC AUTO-ENTMCNC: 34.4 % (ref 33–37)
MCV RBC AUTO: 91.2 FL (ref 82–100)
MONOCYTES ABSOLUTE: 0.7 K/UL (ref 0.2–0.8)
MONOCYTES RELATIVE PERCENT: 9.1 %
NEUTROPHILS ABSOLUTE: 4.8 K/UL (ref 1.4–6.5)
NEUTROPHILS RELATIVE PERCENT: 61.6 %
PDW BLD-RTO: 13.4 % (ref 11.5–14.5)
PLATELET # BLD: 329 K/UL (ref 130–400)
POTASSIUM SERPL-SCNC: 4.3 MEQ/L (ref 3.5–5.1)
RBC # BLD: 4.73 M/UL (ref 4.2–5.4)
SODIUM BLD-SCNC: 143 MEQ/L (ref 132–144)
TOTAL PROTEIN: 7.3 G/DL (ref 6.4–8.1)
TRIGL SERPL-MCNC: 78 MG/DL (ref 0–200)
WBC # BLD: 7.8 K/UL (ref 4.8–10.8)

## 2018-11-05 ENCOUNTER — TELEPHONE (OUTPATIENT)
Dept: FAMILY MEDICINE CLINIC | Age: 53
End: 2018-11-05

## 2018-11-06 RX ORDER — ATORVASTATIN CALCIUM 40 MG/1
40 TABLET, FILM COATED ORAL DAILY
Qty: 30 TABLET | Refills: 3 | Status: SHIPPED | OUTPATIENT
Start: 2018-11-06 | End: 2019-03-03 | Stop reason: SDUPTHER

## 2018-11-12 RX ORDER — FLUTICASONE PROPIONATE 50 MCG
SPRAY, SUSPENSION (ML) NASAL
Qty: 1 BOTTLE | Refills: 3 | Status: SHIPPED | OUTPATIENT
Start: 2018-11-12 | End: 2019-03-15 | Stop reason: SDUPTHER

## 2018-12-05 DIAGNOSIS — M25.562 LEFT KNEE PAIN, UNSPECIFIED CHRONICITY: ICD-10-CM

## 2018-12-05 RX ORDER — HYDROCODONE BITARTRATE AND ACETAMINOPHEN 7.5; 325 MG/1; MG/1
1 TABLET ORAL EVERY 8 HOURS PRN
Qty: 90 TABLET | Refills: 0 | Status: SHIPPED | OUTPATIENT
Start: 2018-12-05 | End: 2018-12-26 | Stop reason: SDUPTHER

## 2018-12-26 DIAGNOSIS — M25.562 LEFT KNEE PAIN, UNSPECIFIED CHRONICITY: ICD-10-CM

## 2018-12-26 RX ORDER — HYDROCODONE BITARTRATE AND ACETAMINOPHEN 7.5; 325 MG/1; MG/1
1 TABLET ORAL EVERY 8 HOURS PRN
Qty: 90 TABLET | Refills: 0 | Status: SHIPPED | OUTPATIENT
Start: 2018-12-26 | End: 2019-01-23 | Stop reason: SDUPTHER

## 2018-12-27 NOTE — TELEPHONE ENCOUNTER
Per milton     \" Thank you. I dropped it off at the pharmacy, but the pharmacist said the doctor needs to call them for an early refill. Thank you for taking care of this\"       Please advise.  Can we fill early    Norco last filled 12/05/18, # 80

## 2018-12-28 NOTE — TELEPHONE ENCOUNTER
Pt sent the following [employer] message back about her need for her refill early:     I  am going on vacation.  I mentioned that in my original email when I asked if it was possible to fill early.                     \" Thank you. I dropped it off at the pharmacy, but the pharmacist said the doctor needs to call them for an early refill. Thank you for taking care of this\"         Please advise.  Can we fill early     Norco last filled 12/05/18, # 80            Documentation

## 2019-01-23 ENCOUNTER — TELEPHONE (OUTPATIENT)
Dept: FAMILY MEDICINE CLINIC | Age: 54
End: 2019-01-23

## 2019-01-23 DIAGNOSIS — M25.562 LEFT KNEE PAIN, UNSPECIFIED CHRONICITY: ICD-10-CM

## 2019-01-23 RX ORDER — HYDROCODONE BITARTRATE AND ACETAMINOPHEN 7.5; 325 MG/1; MG/1
1 TABLET ORAL EVERY 8 HOURS PRN
Qty: 90 TABLET | Refills: 0 | Status: SHIPPED | OUTPATIENT
Start: 2019-01-23 | End: 2019-02-25 | Stop reason: SDUPTHER

## 2019-02-25 DIAGNOSIS — M25.562 LEFT KNEE PAIN, UNSPECIFIED CHRONICITY: ICD-10-CM

## 2019-02-25 RX ORDER — HYDROCODONE BITARTRATE AND ACETAMINOPHEN 7.5; 325 MG/1; MG/1
1 TABLET ORAL EVERY 8 HOURS PRN
Qty: 90 TABLET | Refills: 0 | Status: SHIPPED | OUTPATIENT
Start: 2019-02-25 | End: 2019-03-15 | Stop reason: SDUPTHER

## 2019-03-04 RX ORDER — ATORVASTATIN CALCIUM 40 MG/1
40 TABLET, FILM COATED ORAL DAILY
Qty: 30 TABLET | Refills: 2 | Status: SHIPPED | OUTPATIENT
Start: 2019-03-04

## 2019-03-15 ENCOUNTER — OFFICE VISIT (OUTPATIENT)
Dept: FAMILY MEDICINE CLINIC | Age: 54
End: 2019-03-15
Payer: COMMERCIAL

## 2019-03-15 VITALS
HEART RATE: 100 BPM | DIASTOLIC BLOOD PRESSURE: 68 MMHG | BODY MASS INDEX: 34.76 KG/M2 | TEMPERATURE: 97.1 F | SYSTOLIC BLOOD PRESSURE: 118 MMHG | HEIGHT: 62 IN

## 2019-03-15 DIAGNOSIS — E78.5 HYPERLIPIDEMIA, UNSPECIFIED HYPERLIPIDEMIA TYPE: ICD-10-CM

## 2019-03-15 DIAGNOSIS — M25.562 LEFT KNEE PAIN, UNSPECIFIED CHRONICITY: Primary | ICD-10-CM

## 2019-03-15 LAB
ALBUMIN SERPL-MCNC: 4.3 G/DL (ref 3.5–4.6)
ALP BLD-CCNC: 88 U/L (ref 40–130)
ALT SERPL-CCNC: 30 U/L (ref 0–33)
ANION GAP SERPL CALCULATED.3IONS-SCNC: 13 MEQ/L (ref 9–15)
AST SERPL-CCNC: 32 U/L (ref 0–35)
BASOPHILS ABSOLUTE: 0.1 K/UL (ref 0–0.2)
BASOPHILS RELATIVE PERCENT: 1.3 %
BILIRUB SERPL-MCNC: 0.5 MG/DL (ref 0.2–0.7)
BUN BLDV-MCNC: 12 MG/DL (ref 6–20)
CALCIUM SERPL-MCNC: 9 MG/DL (ref 8.5–9.9)
CHLORIDE BLD-SCNC: 107 MEQ/L (ref 95–107)
CHOLESTEROL, TOTAL: 148 MG/DL (ref 0–199)
CO2: 24 MEQ/L (ref 20–31)
CREAT SERPL-MCNC: 0.62 MG/DL (ref 0.5–0.9)
EOSINOPHILS ABSOLUTE: 0.3 K/UL (ref 0–0.7)
EOSINOPHILS RELATIVE PERCENT: 5.9 %
GFR AFRICAN AMERICAN: >60
GFR NON-AFRICAN AMERICAN: >60
GLOBULIN: 2.6 G/DL (ref 2.3–3.5)
GLUCOSE BLD-MCNC: 99 MG/DL (ref 70–99)
HCT VFR BLD CALC: 42.2 % (ref 37–47)
HDLC SERPL-MCNC: 76 MG/DL (ref 40–59)
HEMOGLOBIN: 14.1 G/DL (ref 12–16)
LDL CHOLESTEROL CALCULATED: 59 MG/DL (ref 0–129)
LYMPHOCYTES ABSOLUTE: 1.6 K/UL (ref 1–4.8)
LYMPHOCYTES RELATIVE PERCENT: 28 %
MCH RBC QN AUTO: 30.4 PG (ref 27–31.3)
MCHC RBC AUTO-ENTMCNC: 33.4 % (ref 33–37)
MCV RBC AUTO: 91 FL (ref 82–100)
MONOCYTES ABSOLUTE: 0.5 K/UL (ref 0.2–0.8)
MONOCYTES RELATIVE PERCENT: 8.5 %
NEUTROPHILS ABSOLUTE: 3.3 K/UL (ref 1.4–6.5)
NEUTROPHILS RELATIVE PERCENT: 56.3 %
PDW BLD-RTO: 13.6 % (ref 11.5–14.5)
PLATELET # BLD: 275 K/UL (ref 130–400)
POTASSIUM SERPL-SCNC: 4.5 MEQ/L (ref 3.4–4.9)
RBC # BLD: 4.64 M/UL (ref 4.2–5.4)
SODIUM BLD-SCNC: 144 MEQ/L (ref 135–144)
TOTAL PROTEIN: 6.9 G/DL (ref 6.3–8)
TRIGL SERPL-MCNC: 65 MG/DL (ref 0–150)
WBC # BLD: 5.9 K/UL (ref 4.8–10.8)

## 2019-03-15 PROCEDURE — 99213 OFFICE O/P EST LOW 20 MIN: CPT | Performed by: FAMILY MEDICINE

## 2019-03-15 RX ORDER — HYDROCODONE BITARTRATE AND ACETAMINOPHEN 7.5; 325 MG/1; MG/1
1 TABLET ORAL EVERY 8 HOURS PRN
Qty: 90 TABLET | Refills: 0 | Status: SHIPPED | OUTPATIENT
Start: 2019-03-15 | End: 2019-04-14

## 2019-03-15 RX ORDER — BUDESONIDE AND FORMOTEROL FUMARATE DIHYDRATE 80; 4.5 UG/1; UG/1
2 AEROSOL RESPIRATORY (INHALATION) 2 TIMES DAILY
Qty: 3 INHALER | Refills: 11 | Status: SHIPPED | OUTPATIENT
Start: 2019-03-15

## 2019-03-15 RX ORDER — FLUTICASONE PROPIONATE 50 MCG
SPRAY, SUSPENSION (ML) NASAL
Qty: 3 BOTTLE | Refills: 2 | Status: SHIPPED | OUTPATIENT
Start: 2019-03-15

## 2019-03-15 RX ORDER — ALBUTEROL SULFATE 90 UG/1
2 AEROSOL, METERED RESPIRATORY (INHALATION) EVERY 4 HOURS PRN
Qty: 3 INHALER | Refills: 11 | Status: SHIPPED | OUTPATIENT
Start: 2019-03-15

## 2019-03-15 ASSESSMENT — ENCOUNTER SYMPTOMS
EYE ITCHING: 0
SINUS PRESSURE: 0
EYE DISCHARGE: 0
ABDOMINAL PAIN: 0
SORE THROAT: 0
SHORTNESS OF BREATH: 0
DIARRHEA: 0
CONSTIPATION: 0
COUGH: 0
BACK PAIN: 1

## 2019-03-15 ASSESSMENT — PATIENT HEALTH QUESTIONNAIRE - PHQ9
2. FEELING DOWN, DEPRESSED OR HOPELESS: 0
1. LITTLE INTEREST OR PLEASURE IN DOING THINGS: 0
SUM OF ALL RESPONSES TO PHQ QUESTIONS 1-9: 0
SUM OF ALL RESPONSES TO PHQ9 QUESTIONS 1 & 2: 0
SUM OF ALL RESPONSES TO PHQ QUESTIONS 1-9: 0

## 2020-03-30 NOTE — TELEPHONE ENCOUNTER
Medication: Norco     Last Office Visit: 07/2018    Last filled: 08/16/18    Last Signed Contract:     Last Utox: 07/2018    Last OARRS: (4) excellent

## 2023-03-15 ENCOUNTER — TELEPHONE (OUTPATIENT)
Dept: PRIMARY CARE | Facility: CLINIC | Age: 58
End: 2023-03-15
Payer: COMMERCIAL

## 2023-03-15 NOTE — TELEPHONE ENCOUNTER
Pt is calling in regards to the my chart message she sent yesterday about needing a note for walking. (See previous message) She did get a letter about a lot of stair climbing but there are days she has to walk her property and her knees don't always allow her to finish. Can she get a letter stating that a lot of walking also needs to be limited on days when her knee isn't at it's best?  Please advise  Thanks    Please mail letter to patient as she lives in [place].   Please call patient when mailed [phone]

## 2023-03-17 ENCOUNTER — TELEPHONE (OUTPATIENT)
Dept: PRIMARY CARE | Facility: CLINIC | Age: 58
End: 2023-03-17
Payer: COMMERCIAL

## 2023-03-17 NOTE — LETTER
March 17, 2023     Patient: Festus Menjivar   YOB: 1965   Date of Visit: 3/17/2023       To Whom It May Concern:    Festus Menjivar is a current patient of mine. There are certain days that she may have an issue or difficulty walking long distances and stairs.     If you have any questions or concerns, please don't hesitate to call.         Sincerely,         Uvaldo Hancock MD        CC: No Recipients

## 2023-03-20 DIAGNOSIS — G89.29 CHRONIC PAIN OF RIGHT KNEE: Primary | ICD-10-CM

## 2023-03-20 DIAGNOSIS — M25.561 CHRONIC PAIN OF RIGHT KNEE: Primary | ICD-10-CM

## 2023-03-20 RX ORDER — HYDROCODONE BITARTRATE AND ACETAMINOPHEN 7.5; 325 MG/1; MG/1
1 TABLET ORAL 3 TIMES DAILY PRN
Qty: 90 TABLET | Refills: 0 | Status: SHIPPED | OUTPATIENT
Start: 2023-03-20 | End: 2023-04-20 | Stop reason: SDUPTHER

## 2023-03-20 RX ORDER — HYDROCODONE BITARTRATE AND ACETAMINOPHEN 7.5; 325 MG/1; MG/1
1 TABLET ORAL 3 TIMES DAILY PRN
COMMUNITY
End: 2023-03-20 | Stop reason: SDUPTHER

## 2023-03-31 DIAGNOSIS — J45.909 ASTHMA, UNSPECIFIED ASTHMA SEVERITY, UNSPECIFIED WHETHER COMPLICATED, UNSPECIFIED WHETHER PERSISTENT (HHS-HCC): ICD-10-CM

## 2023-03-31 RX ORDER — ALBUTEROL SULFATE 90 UG/1
2 AEROSOL, METERED RESPIRATORY (INHALATION) EVERY 4 HOURS PRN
Qty: 8 G | Refills: 2 | Status: SHIPPED | OUTPATIENT
Start: 2023-03-31 | End: 2024-04-16 | Stop reason: SDUPTHER

## 2023-03-31 NOTE — TELEPHONE ENCOUNTER
----- Message from Festus Menjivar sent at 3/30/2023  9:44 PM EDT -----  Regarding: Albuterol inhaler   Contact: [phone]  Can I please get a refill on the above!  Thank you.

## 2023-04-20 DIAGNOSIS — M25.561 CHRONIC PAIN OF RIGHT KNEE: ICD-10-CM

## 2023-04-20 DIAGNOSIS — G89.29 CHRONIC PAIN OF RIGHT KNEE: ICD-10-CM

## 2023-04-20 RX ORDER — HYDROCODONE BITARTRATE AND ACETAMINOPHEN 7.5; 325 MG/1; MG/1
1 TABLET ORAL 3 TIMES DAILY PRN
Qty: 90 TABLET | Refills: 0 | Status: SHIPPED | OUTPATIENT
Start: 2023-04-20 | End: 2023-05-17 | Stop reason: SDUPTHER

## 2023-05-01 DIAGNOSIS — F41.9 ANXIETY: ICD-10-CM

## 2023-05-01 NOTE — TELEPHONE ENCOUNTER
----- Message from Festus Tiara sent at 5/1/2023  5:46 PM EDT -----  Regarding: Refill Diazepam   Contact: [phone]  Can I please get a refill on this medication?  I can see it on my list of meds, but I have no idea how to request it from the medication tab.  Thank you.

## 2023-05-02 RX ORDER — DIAZEPAM 5 MG/1
5 TABLET ORAL 2 TIMES DAILY PRN
Qty: 60 TABLET | Refills: 0 | Status: SHIPPED | OUTPATIENT
Start: 2023-05-02 | End: 2023-06-01 | Stop reason: SDUPTHER

## 2023-05-11 ENCOUNTER — APPOINTMENT (OUTPATIENT)
Dept: PRIMARY CARE | Facility: CLINIC | Age: 58
End: 2023-05-11
Payer: COMMERCIAL

## 2023-05-15 DIAGNOSIS — E78.5 HYPERLIPIDEMIA, UNSPECIFIED HYPERLIPIDEMIA TYPE: ICD-10-CM

## 2023-05-15 RX ORDER — ATORVASTATIN CALCIUM 40 MG/1
40 TABLET, FILM COATED ORAL DAILY
Qty: 90 TABLET | Refills: 0 | Status: SHIPPED | OUTPATIENT
Start: 2023-05-15 | End: 2023-11-10 | Stop reason: SDUPTHER

## 2023-05-15 RX ORDER — ATORVASTATIN CALCIUM 40 MG/1
40 TABLET, FILM COATED ORAL DAILY
COMMUNITY
Start: 2019-06-10 | End: 2023-05-15 | Stop reason: SDUPTHER

## 2023-05-15 NOTE — TELEPHONE ENCOUNTER
----- Message from Festus Tiara sent at 5/14/2023 12:59 PM EDT -----  Regarding: Atorvastatin 40mg 90tablets  Contact: [phone]  Can I please get a refill on this?  This is another medication that is not showing up on my list of meds.  Thank you!

## 2023-05-17 DIAGNOSIS — G89.29 CHRONIC PAIN OF RIGHT KNEE: ICD-10-CM

## 2023-05-17 DIAGNOSIS — M25.561 CHRONIC PAIN OF RIGHT KNEE: ICD-10-CM

## 2023-05-17 RX ORDER — HYDROCODONE BITARTRATE AND ACETAMINOPHEN 7.5; 325 MG/1; MG/1
1 TABLET ORAL 3 TIMES DAILY PRN
Qty: 24 TABLET | Refills: 0 | Status: SHIPPED | OUTPATIENT
Start: 2023-05-17 | End: 2023-05-25 | Stop reason: SDUPTHER

## 2023-05-19 ENCOUNTER — APPOINTMENT (OUTPATIENT)
Dept: PRIMARY CARE | Facility: CLINIC | Age: 58
End: 2023-05-19
Payer: COMMERCIAL

## 2023-05-23 PROBLEM — J45.909 ASTHMA (HHS-HCC): Status: ACTIVE | Noted: 2023-05-23

## 2023-05-23 PROBLEM — M65.342 TRIGGER RING FINGER OF LEFT HAND: Status: ACTIVE | Noted: 2023-05-23

## 2023-05-23 PROBLEM — F41.1 GENERALIZED ANXIETY DISORDER: Status: ACTIVE | Noted: 2023-05-23

## 2023-05-23 PROBLEM — J30.2 SEASONAL ALLERGIES: Status: ACTIVE | Noted: 2023-05-23

## 2023-05-23 PROBLEM — E78.5 HYPERLIPIDEMIA: Status: ACTIVE | Noted: 2023-05-23

## 2023-05-23 PROBLEM — F32.A DEPRESSION: Status: ACTIVE | Noted: 2023-05-23

## 2023-05-23 PROBLEM — M25.562 CHRONIC PAIN OF LEFT KNEE: Status: ACTIVE | Noted: 2023-05-23

## 2023-05-23 PROBLEM — G89.29 CHRONIC PAIN OF LEFT KNEE: Status: ACTIVE | Noted: 2023-05-23

## 2023-05-23 PROBLEM — G47.9 SLEEP DIFFICULTIES: Status: ACTIVE | Noted: 2023-05-23

## 2023-05-25 ENCOUNTER — OFFICE VISIT (OUTPATIENT)
Dept: PRIMARY CARE | Facility: CLINIC | Age: 58
End: 2023-05-25
Payer: COMMERCIAL

## 2023-05-25 VITALS
RESPIRATION RATE: 18 BRPM | HEART RATE: 96 BPM | BODY MASS INDEX: 33.51 KG/M2 | OXYGEN SATURATION: 96 % | SYSTOLIC BLOOD PRESSURE: 112 MMHG | DIASTOLIC BLOOD PRESSURE: 60 MMHG | WEIGHT: 171.6 LBS

## 2023-05-25 DIAGNOSIS — Z12.31 SCREENING MAMMOGRAM, ENCOUNTER FOR: ICD-10-CM

## 2023-05-25 DIAGNOSIS — Z12.11 SCREEN FOR COLON CANCER: ICD-10-CM

## 2023-05-25 DIAGNOSIS — M25.561 CHRONIC PAIN OF RIGHT KNEE: ICD-10-CM

## 2023-05-25 DIAGNOSIS — G89.29 CHRONIC PAIN OF RIGHT KNEE: ICD-10-CM

## 2023-05-25 PROCEDURE — 99213 OFFICE O/P EST LOW 20 MIN: CPT | Performed by: FAMILY MEDICINE

## 2023-05-25 RX ORDER — HYDROCODONE BITARTRATE AND ACETAMINOPHEN 7.5; 325 MG/1; MG/1
1 TABLET ORAL 3 TIMES DAILY PRN
Qty: 90 TABLET | Refills: 0 | Status: SHIPPED | OUTPATIENT
Start: 2023-05-25 | End: 2023-06-21 | Stop reason: SDUPTHER

## 2023-05-25 RX ORDER — LIDOCAINE 50 MG/G
1 PATCH TOPICAL DAILY
COMMUNITY
Start: 2023-03-14 | End: 2023-10-06 | Stop reason: SDUPTHER

## 2023-05-25 RX ORDER — MULTIVITAMIN
1 TABLET ORAL
COMMUNITY
Start: 2014-04-30

## 2023-05-25 RX ORDER — FLUTICASONE PROPIONATE 50 MCG
2 SPRAY, SUSPENSION (ML) NASAL DAILY
COMMUNITY
End: 2024-04-16 | Stop reason: ALTCHOICE

## 2023-05-25 RX ORDER — FLUTICASONE PROPIONATE AND SALMETEROL 250; 50 UG/1; UG/1
1 POWDER RESPIRATORY (INHALATION)
COMMUNITY
End: 2023-11-10 | Stop reason: SDUPTHER

## 2023-05-25 RX ORDER — NALOXONE HYDROCHLORIDE 4 MG/.1ML
4 SPRAY NASAL AS NEEDED
COMMUNITY
Start: 2020-08-06

## 2023-05-25 RX ORDER — CYCLOBENZAPRINE HCL 10 MG
10 TABLET ORAL 3 TIMES DAILY
COMMUNITY
End: 2023-10-06 | Stop reason: SDUPTHER

## 2023-05-25 ASSESSMENT — PATIENT HEALTH QUESTIONNAIRE - PHQ9
1. LITTLE INTEREST OR PLEASURE IN DOING THINGS: NOT AT ALL
SUM OF ALL RESPONSES TO PHQ9 QUESTIONS 1 AND 2: 0
2. FEELING DOWN, DEPRESSED OR HOPELESS: NOT AT ALL

## 2023-05-25 NOTE — PROGRESS NOTES
Subjective   Patient ID: Festus Menjivar is a 57 y.o. female who presents for No chief complaint on file..    HPI  Here for above concern  Ongoing  for a long time  LEFT knee effected  Pt has seen ortho and it is bone on bone    Patient presents today for NORCO follow up.  Rates pain 7/10.over the past 7 days before taking medication  Reports that the medication gives 50% control/relief  OAR[place] reviewed today.  Controlled substance agreement signed.  Last took medication yesterday.    Patient presents today for diazepam follow up.  Rates mood control 8/10  Mood is 90% controlled.  OARRS reviewed today.  Controlled substance agreement signed.  Last took medication 2 days ago.      Mammogram and colon screenings ordered    Review of Systems  Constitutional:  no chills, no fever and no night sweats.  Eyes: no blurred vision and no eyesight problems.  ENT: no hearing loss, no nasal congestion, no hoarseness and no sore throat.  Neck: no mass (es) and no swelling.  Cardiovascular: no chest pain, no intermittent leg claudication, no lower extremity edema, no palpitation and no syncope.  Respiratory: no cough, no shortness of breath during exertion, no shortness of breath at rest and no wheezing.  Gastrointestinal: no abdominal pain, no blood in stools, no constipation, no diarrhea, no melena, no nausea, no rectal pain and no vomiting.  Genitourinary: no dysuria, no change in urinary frequency, no urinary hesitancy and no feelings of urinary urgency.  Musculoskeletal: no arthralgias, no back pain and no myalgias.  Integumentary: no new skin lesions and no rashes.  Neurological: no difficulty walking, no headache, no limb weakness, no numbness and no tingling.  Psychiatric/Behavioral: no anxiety, no depression, no anhedonia and no substance use disorders.  Endocrine: no recent weight gain and no recent weight loss.  Hematologic/Lymphatic: no tendency for easy bruising and no swollen glands    Objective   Physical  Exam  Patient in for follow-up chronic left knee pain continues to worsen now bone-on-bone she is started a new job 9 months ago is hoping to get through the holidays if no fall and then get the knee replacement done we had recommended for her to be evaluated by Dr. Murphy when she reaches a point where she feels like she can take the time off work.  She has minimal effusion crepitus with passive flexion extension chronic knee pain.  Rest of the exam benign.  Physical exam today's office visit constitutional alert and oriented x3.    Head is atraumatic HEENT is within normal limits.    Neck supple no masses full range of motion.    Thyroid is normal in size no thyromegaly there is no carotid bruits.    Pulmonary exam shows clear to auscultation no respiratory distress.    Cardiovascular shows no murmur rub or gallop.  Regular rate and rhythm.    Abdominal exam soft nontender no hepatosplenomegaly or masses normal bowel sounds no rebound no guarding.    Musculoskeletal exam no joint pain no muscle pain full range of motion.    Psych exam normal mood and affect.    Dermatologic exam no skin lesions no rash no blemishes.    Neuro exam is no focal deficits.  Normal exam.    Extremities no edema normal pulses normal capillary refill.    /60   Pulse 96   Resp 18   Wt 77.8 kg (171 lb 9.6 oz)   SpO2 96%   BMI 33.51 kg/m²     Lab Results   Component Value Date    WBC 6.9 11/10/2022    HGB 14.2 11/10/2022    HCT 43.6 11/10/2022    MCV 95 11/10/2022     11/10/2022       Assessment/Plan plan is refill her medication due for mammogram colonoscopy we will get those scheduled also follow-up with her in 3 months.  Problem List Items Addressed This Visit    None  Visit Diagnoses       Screen for colon cancer        Screening mammogram, encounter for        Chronic pain of right knee

## 2023-05-28 ENCOUNTER — TELEMEDICINE (OUTPATIENT)
Dept: PRIMARY CARE | Facility: CLINIC | Age: 58
End: 2023-05-28
Payer: COMMERCIAL

## 2023-05-28 DIAGNOSIS — J02.0 STREP SORE THROAT: Primary | ICD-10-CM

## 2023-05-28 PROCEDURE — 99213 OFFICE O/P EST LOW 20 MIN: CPT | Performed by: FAMILY MEDICINE

## 2023-05-28 RX ORDER — CEPHALEXIN 500 MG/1
500 CAPSULE ORAL 2 TIMES DAILY
Qty: 20 CAPSULE | Refills: 0 | Status: SHIPPED | OUTPATIENT
Start: 2023-05-28 | End: 2023-06-07

## 2023-05-28 NOTE — PROGRESS NOTES
Subjective   Festus Menjivar is a 57 y.o. female who presents for evaluation of sore throat. Associated symptoms include low grade fevers, enlarged tonsils, pain while swallowing, sore throat, and white spots in throat. Onset of symptoms was 2 days ago, and have been unchanged since that time. She is drinking moderate amounts of fluids. She has had a recent close exposure to someone with proven streptococcal pharyngitis.        Past Medical History:   Diagnosis Date    Acute cystitis without hematuria 09/02/2020    Acute cystitis without hematuria    Contact with and (suspected) exposure to covid-19 12/23/2021    Close exposure to COVID-19 virus    Encounter for general adult medical examination without abnormal findings 01/08/2020    Well adult exam    Encounter for other screening for malignant neoplasm of breast     Breast cancer screening    Encounter for screening for malignant neoplasm of colon 01/08/2020    Colon cancer screening    Encounter for screening for malignant neoplasm of colon     Colon cancer screening    Opioid use, unspecified, uncomplicated     Chronic, continuous use of opioids    Pain in left knee 11/10/2022    Chronic pain of left knee    Personal history of other drug therapy 01/08/2020    History of influenza vaccination    Personal history of other drug therapy 12/11/2020    History of influenza vaccination    Personal history of other medical treatment 09/12/2019    History of screening mammography    Urinary tract infection, site not specified 03/14/2020    Urinary tract infection, acute         Objective   PHYSICAL EXAMINATION:  GENERAL: Alert,In no apparent distress  HEENT: Normocephalic, atraumatic. Extraocular movements intact, white spots inn oropharynx  NECK: Supple.  CHEST: Non labored breathing  EXTREMITIES: NROM  NEUROLOGIC: Motor Functions Grossly intact      Laboratory  Strep test ordered.     Assessment/Plan   Diagnoses and all orders for this visit:  Strep sore throat  -      cephalexin (Keflex) 500 mg capsule; Take 1 capsule (500 mg) by mouth 2 times a day for 10 days.  -     Rapid strep screen; Future ordered and info given to schedule a nurse visit for testing    [facility] - [address] [place] [address] [address] [place], [place] [address] [phone]  Testing Hours  Monday: 9:00 a.m. - 4:00 p.m.  Tuesday - Thursday: 9:00 a.m. - 7 p.m.  Friday: 8:30 a.m. - 5 p.m.  [address] [address] [phone]  Testing Hours  Monday - Friday 9:00 a.m. - 3:00 p.m.  [facility] - [address] [place] [id number] [phone]  Testing Hours  Monday - Thursday 9:00 a.m. - 3:00 p.m.        Diagnosis and Management discussed with the patient.  Patient agreeable with plan.  Patient advised to Return to clinic with new or unresolved symptoms.  Juliano Jiménez MD

## 2023-05-30 ENCOUNTER — TELEPHONE (OUTPATIENT)
Dept: PRIMARY CARE | Facility: CLINIC | Age: 58
End: 2023-05-30
Payer: COMMERCIAL

## 2023-05-30 DIAGNOSIS — J02.9 PHARYNGITIS, UNSPECIFIED ETIOLOGY: Primary | ICD-10-CM

## 2023-05-30 RX ORDER — AZITHROMYCIN 250 MG/1
TABLET, FILM COATED ORAL
Qty: 6 TABLET | Refills: 0 | Status: SHIPPED | OUTPATIENT
Start: 2023-05-30 | End: 2023-06-04

## 2023-05-30 NOTE — TELEPHONE ENCOUNTER
Uvaldo Hancock MD  Do [id number] [facility] Clinical Support Staff3 hours ago (12:30 PM)       Prescription was sent in.  Keflex is a not like amoxicillin sometimes people cross-react to cephalosporins but most of the time there is no problems.   Patient aware

## 2023-05-30 NOTE — TELEPHONE ENCOUNTER
----- Message from Festus Menjivar sent at 5/29/2023  8:29 PM EDT -----  Regarding: Keflex  Contact: [phone]  Hi, Saturday night my throat was getting sore and on Sunday it was sore with white patches on my tonsils.  I did an e virtual visit and they were able to see the back of my throat, he asked if I had any allergies and I said yes to Amoxicillin.  The doctor asked if I ever took Keflex and I said no, he called that in and the pharmacy flagged it because it’s like amoxicillin, I was going to call the virtual visit back, but it would be another $20.00.  I just saw Dr. Hancock last Thursday.  Can he call me in something for this?  It really only hurts on the left side, I still have the patches and no fever to speak of 99.3.  Thank you.

## 2023-06-01 DIAGNOSIS — F41.9 ANXIETY: ICD-10-CM

## 2023-06-01 RX ORDER — DIAZEPAM 5 MG/1
5 TABLET ORAL 2 TIMES DAILY PRN
Qty: 60 TABLET | Refills: 0 | Status: SHIPPED | OUTPATIENT
Start: 2023-06-01 | End: 2023-06-27 | Stop reason: SDUPTHER

## 2023-06-01 NOTE — TELEPHONE ENCOUNTER
----- Message from Festus Menjivar sent at 6/1/2023  8:44 AM EDT -----  Regarding: Diazepam   Contact: [phone]  Can I please get a refill on this medication?

## 2023-06-05 ENCOUNTER — TELEPHONE (OUTPATIENT)
Dept: PRIMARY CARE | Facility: CLINIC | Age: 58
End: 2023-06-05
Payer: COMMERCIAL

## 2023-06-05 NOTE — TELEPHONE ENCOUNTER
----- Message from Festus Tiara sent at 6/3/2023  2:45 PM EDT -----  Regarding: Fluticasone Propionate Nasal spray by [facility]  Contact: [phone]  I have a medication that has been recalled by [facility].  They said to contact my doctors office for a replacement.  I received 3 of these.  Please let me know what I should do.

## 2023-06-06 NOTE — TELEPHONE ENCOUNTER
Uvaldo Hancock MD  Do [id number] [facility] Clinical Support Staff1 hour ago (10:23 AM)       These are all over-the-counter.  Fluticasone is Flonase which she can buy over-the-counter.   I called and LM for patient to call the office

## 2023-06-21 DIAGNOSIS — G89.29 CHRONIC PAIN OF RIGHT KNEE: ICD-10-CM

## 2023-06-21 DIAGNOSIS — M25.561 CHRONIC PAIN OF RIGHT KNEE: ICD-10-CM

## 2023-06-21 RX ORDER — HYDROCODONE BITARTRATE AND ACETAMINOPHEN 7.5; 325 MG/1; MG/1
1 TABLET ORAL 3 TIMES DAILY PRN
Qty: 90 TABLET | Refills: 0 | Status: SHIPPED | OUTPATIENT
Start: 2023-06-21 | End: 2023-07-18 | Stop reason: SDUPTHER

## 2023-06-21 NOTE — TELEPHONE ENCOUNTER
----- Message from Festus Menjivar sent at 6/21/2023  8:43 AM EDT -----  Regarding: Martinez  Contact: [phone]  Can I please get a refill on this medication?  I do not see where it allows me to choose it from my list of medications.  Thank you!

## 2023-06-27 DIAGNOSIS — F41.9 ANXIETY: ICD-10-CM

## 2023-06-27 RX ORDER — DIAZEPAM 5 MG/1
5 TABLET ORAL 2 TIMES DAILY PRN
Qty: 60 TABLET | Refills: 0 | Status: SHIPPED | OUTPATIENT
Start: 2023-06-27 | End: 2023-07-25 | Stop reason: SDUPTHER

## 2023-07-18 DIAGNOSIS — M25.561 CHRONIC PAIN OF RIGHT KNEE: ICD-10-CM

## 2023-07-18 DIAGNOSIS — G89.29 CHRONIC PAIN OF RIGHT KNEE: ICD-10-CM

## 2023-07-18 RX ORDER — HYDROCODONE BITARTRATE AND ACETAMINOPHEN 7.5; 325 MG/1; MG/1
1 TABLET ORAL 3 TIMES DAILY PRN
Qty: 90 TABLET | Refills: 0 | Status: SHIPPED | OUTPATIENT
Start: 2023-07-18 | End: 2023-08-16 | Stop reason: SDUPTHER

## 2023-07-18 NOTE — TELEPHONE ENCOUNTER
----- Message from Festus Menjivar sent at 7/18/2023  8:36 AM EDT -----  Regarding: [place]  Contact: [phone]  Can I please get a refill on this medication.  Thank you,

## 2023-07-25 DIAGNOSIS — F41.9 ANXIETY: ICD-10-CM

## 2023-07-25 RX ORDER — DIAZEPAM 5 MG/1
5 TABLET ORAL 2 TIMES DAILY PRN
Qty: 60 TABLET | Refills: 0 | Status: SHIPPED | OUTPATIENT
Start: 2023-07-25 | End: 2023-08-24 | Stop reason: SDUPTHER

## 2023-07-25 NOTE — TELEPHONE ENCOUNTER
----- Message from Festus Menjivar sent at 7/25/2023 12:33 PM EDT -----  Regarding: Diazapam  Contact: [phone]  Can I please get a refill on this medication?  Thank you.

## 2023-08-16 DIAGNOSIS — G89.29 CHRONIC PAIN OF RIGHT KNEE: ICD-10-CM

## 2023-08-16 DIAGNOSIS — M25.561 CHRONIC PAIN OF RIGHT KNEE: ICD-10-CM

## 2023-08-16 RX ORDER — HYDROCODONE BITARTRATE AND ACETAMINOPHEN 7.5; 325 MG/1; MG/1
1 TABLET ORAL 3 TIMES DAILY PRN
Qty: 30 TABLET | Refills: 0 | Status: SHIPPED | OUTPATIENT
Start: 2023-08-16 | End: 2023-08-21 | Stop reason: SDUPTHER

## 2023-08-16 NOTE — TELEPHONE ENCOUNTER
----- Message from Festus Menjivar sent at 8/16/2023  8:35 AM EDT -----  Regarding: Martinez   Contact: [phone]  Good morning, can I please get a refill on this medication?  I have an appointment scheduled the 25th at 8:15.  If I my scheduled lightens up and I can get in earlier I will certainly do that.      Thank you,

## 2023-08-17 ENCOUNTER — APPOINTMENT (OUTPATIENT)
Dept: PRIMARY CARE | Facility: CLINIC | Age: 58
End: 2023-08-17
Payer: COMMERCIAL

## 2023-08-21 ENCOUNTER — OFFICE VISIT (OUTPATIENT)
Dept: PRIMARY CARE | Facility: CLINIC | Age: 58
End: 2023-08-21
Payer: COMMERCIAL

## 2023-08-21 VITALS
HEART RATE: 102 BPM | WEIGHT: 176 LBS | SYSTOLIC BLOOD PRESSURE: 120 MMHG | OXYGEN SATURATION: 97 % | TEMPERATURE: 97.9 F | BODY MASS INDEX: 34.37 KG/M2 | DIASTOLIC BLOOD PRESSURE: 70 MMHG | RESPIRATION RATE: 20 BRPM

## 2023-08-21 DIAGNOSIS — M25.562 CHRONIC PAIN OF LEFT KNEE: ICD-10-CM

## 2023-08-21 DIAGNOSIS — M25.561 CHRONIC PAIN OF RIGHT KNEE: ICD-10-CM

## 2023-08-21 DIAGNOSIS — G89.29 CHRONIC PAIN OF LEFT KNEE: ICD-10-CM

## 2023-08-21 DIAGNOSIS — G89.29 CHRONIC PAIN OF RIGHT KNEE: ICD-10-CM

## 2023-08-21 PROCEDURE — 99213 OFFICE O/P EST LOW 20 MIN: CPT | Performed by: FAMILY MEDICINE

## 2023-08-21 PROCEDURE — 1036F TOBACCO NON-USER: CPT | Performed by: FAMILY MEDICINE

## 2023-08-21 RX ORDER — HYDROCODONE BITARTRATE AND ACETAMINOPHEN 7.5; 325 MG/1; MG/1
1 TABLET ORAL 3 TIMES DAILY PRN
Qty: 90 TABLET | Refills: 0 | Status: SHIPPED | OUTPATIENT
Start: 2023-08-21 | End: 2023-09-21 | Stop reason: SDUPTHER

## 2023-08-21 NOTE — PROGRESS NOTES
Subjective   Patient ID: Festus Menjivar is a 58 y.o. female who presents for Knee Pain and Anxiety.    HPI  Patient presents today for NORCO follow up.  Rates pain 8/10.over the past 7 days before taking medication  Reports that the medication gives 60-70% control/relief  OARRS reviewed today.  Controlled substance agreement signed.  Last took medication today.    Patient presents today for DIAZEPAM follow up.  Rates mood control 8/10  Mood is 90% controlled.  OARRS reviewed today.  Controlled substance agreement signed.  Last took medication 2 days ago.    Pt states LEFT knee is getting more painful  Pt getting sharp pain underneath kneecap  Would like to discuss getting orthopedic referral for replacement    No new concerns    Review of Systems  Constitutional:  no chills, no fever and no night sweats.  Eyes: no blurred vision and no eyesight problems.  ENT: no hearing loss, no nasal congestion, no hoarseness and no sore throat.  Neck: no mass (es) and no swelling.  Cardiovascular: no chest pain, no intermittent leg claudication, no lower extremity edema, no palpitation and no syncope.  Respiratory: no cough, no shortness of breath during exertion, no shortness of breath at rest and no wheezing.  Gastrointestinal: no abdominal pain, no blood in stools, no constipation, no diarrhea, no melena, no nausea, no rectal pain and no vomiting.  Genitourinary: no dysuria, no change in urinary frequency, no urinary hesitancy and no feelings of urinary urgency.  Musculoskeletal: no arthralgias, no back pain and no myalgias.  Integumentary: no new skin lesions and no rashes.  Neurological: no difficulty walking, no headache, no limb weakness, no numbness and no tingling.  Psychiatric/Behavioral: no anxiety, no depression, no anhedonia and no substance use disorders.  Endocrine: no recent weight gain and no recent weight loss.  Hematologic/Lymphatic: no tendency for easy bruising and no swollen glands    Objective   Physical  Exam  Patient in for follow-up chronic knee pain is getting worse would like to see orthopedist would like to try to have a knee replacement in October.  Pain tenderness grinding mild effusion worsening.  Plan refill medication refer to orthopedics and await their evaluation  /70   Pulse 102   Temp 36.6 °C (97.9 °F)   Resp 20   Wt 79.8 kg (176 lb)   SpO2 97%   BMI 34.37 kg/m²     Lab Results   Component Value Date    WBC 6.9 11/10/2022    HGB 14.2 11/10/2022    HCT 43.6 11/10/2022    MCV 95 11/10/2022     11/10/2022       Assessment/Plan   Problem List Items Addressed This Visit       Chronic pain of left knee

## 2023-08-24 DIAGNOSIS — F41.9 ANXIETY: ICD-10-CM

## 2023-08-24 RX ORDER — DIAZEPAM 5 MG/1
5 TABLET ORAL 2 TIMES DAILY PRN
Qty: 60 TABLET | Refills: 0 | Status: SHIPPED | OUTPATIENT
Start: 2023-08-24 | End: 2023-09-21 | Stop reason: SDUPTHER

## 2023-08-25 ENCOUNTER — APPOINTMENT (OUTPATIENT)
Dept: PRIMARY CARE | Facility: CLINIC | Age: 58
End: 2023-08-25
Payer: COMMERCIAL

## 2023-09-12 ENCOUNTER — TELEPHONE (OUTPATIENT)
Dept: PRIMARY CARE | Facility: CLINIC | Age: 58
End: 2023-09-12
Payer: COMMERCIAL

## 2023-09-12 DIAGNOSIS — K04.7 TOOTH INFECTION: Primary | ICD-10-CM

## 2023-09-12 RX ORDER — CLINDAMYCIN HYDROCHLORIDE 150 MG/1
150 CAPSULE ORAL 4 TIMES DAILY
Qty: 40 CAPSULE | Refills: 0 | Status: SHIPPED | OUTPATIENT
Start: 2023-09-12 | End: 2023-09-22

## 2023-09-12 NOTE — TELEPHONE ENCOUNTER
Please see below, and advise. Last seen 8-21-23.    Festus Menjivar [id number] [place] [address] Clinical Support Staff  Phone Number: [phone]     Good morning.  I woke up with a toothache and can’t get to my dentist for a couple days, they said I needed to contact my primary for a refill on Clindamycin hcl 150 mg.   Can I please get this filled?  I would be so grateful!     know if it gets to infected that won’t work on it.  I’m due for a root canal as this is the second time this is flaring up.  I just do not have great dental insurance.

## 2023-09-21 DIAGNOSIS — G89.29 CHRONIC PAIN OF RIGHT KNEE: ICD-10-CM

## 2023-09-21 DIAGNOSIS — M25.561 CHRONIC PAIN OF RIGHT KNEE: ICD-10-CM

## 2023-09-21 DIAGNOSIS — F41.9 ANXIETY: ICD-10-CM

## 2023-09-21 RX ORDER — HYDROCODONE BITARTRATE AND ACETAMINOPHEN 7.5; 325 MG/1; MG/1
1 TABLET ORAL 3 TIMES DAILY PRN
Qty: 90 TABLET | Refills: 0 | Status: SHIPPED | OUTPATIENT
Start: 2023-09-21 | End: 2023-10-18 | Stop reason: SDUPTHER

## 2023-09-21 RX ORDER — DIAZEPAM 5 MG/1
5 TABLET ORAL 2 TIMES DAILY PRN
Qty: 60 TABLET | Refills: 0 | Status: SHIPPED | OUTPATIENT
Start: 2023-09-21 | End: 2023-10-18 | Stop reason: SDUPTHER

## 2023-10-06 DIAGNOSIS — M62.838 MUSCLE SPASM: ICD-10-CM

## 2023-10-06 RX ORDER — CYCLOBENZAPRINE HCL 10 MG
10 TABLET ORAL 3 TIMES DAILY PRN
Qty: 90 TABLET | Refills: 3 | Status: SHIPPED | OUTPATIENT
Start: 2023-10-06 | End: 2024-04-12 | Stop reason: SDUPTHER

## 2023-10-06 RX ORDER — LIDOCAINE 50 MG/G
1 PATCH TOPICAL DAILY
Qty: 30 PATCH | Refills: 3 | Status: SHIPPED | OUTPATIENT
Start: 2023-10-06 | End: 2024-04-10 | Stop reason: WASHOUT

## 2023-10-06 NOTE — TELEPHONE ENCOUNTER
Festus Menjivar [id number] [place] [address] Clinical Support Staff  Phone Number: [phone]     Can I please get a refill on the above medications?  Thank you.

## 2023-10-18 DIAGNOSIS — M25.561 CHRONIC PAIN OF RIGHT KNEE: ICD-10-CM

## 2023-10-18 DIAGNOSIS — G89.29 CHRONIC PAIN OF RIGHT KNEE: ICD-10-CM

## 2023-10-18 DIAGNOSIS — F41.9 ANXIETY: ICD-10-CM

## 2023-10-18 RX ORDER — HYDROCODONE BITARTRATE AND ACETAMINOPHEN 7.5; 325 MG/1; MG/1
1 TABLET ORAL 3 TIMES DAILY PRN
Qty: 90 TABLET | Refills: 0 | Status: SHIPPED | OUTPATIENT
Start: 2023-10-18 | End: 2023-11-16 | Stop reason: SDUPTHER

## 2023-10-18 RX ORDER — DIAZEPAM 5 MG/1
5 TABLET ORAL 2 TIMES DAILY PRN
Qty: 60 TABLET | Refills: 0 | Status: SHIPPED | OUTPATIENT
Start: 2023-10-18 | End: 2023-11-16 | Stop reason: SDUPTHER

## 2023-10-18 NOTE — TELEPHONE ENCOUNTER
----- Message from Festus Menjivar sent at 10/17/2023  8:48 PM EDT -----  Regarding: Norco and Diazepam   Contact: [phone]  Good morning,    Can I please get a refill on the above medications.  Thank you.

## 2023-11-10 DIAGNOSIS — J45.909 ASTHMA, UNSPECIFIED ASTHMA SEVERITY, UNSPECIFIED WHETHER COMPLICATED, UNSPECIFIED WHETHER PERSISTENT (HHS-HCC): ICD-10-CM

## 2023-11-10 DIAGNOSIS — E78.5 HYPERLIPIDEMIA, UNSPECIFIED HYPERLIPIDEMIA TYPE: ICD-10-CM

## 2023-11-10 RX ORDER — FLUTICASONE PROPIONATE AND SALMETEROL 250; 50 UG/1; UG/1
1 POWDER RESPIRATORY (INHALATION)
Qty: 60 EACH | Refills: 1 | Status: SHIPPED | OUTPATIENT
Start: 2023-11-10 | End: 2023-12-01 | Stop reason: SDUPTHER

## 2023-11-10 RX ORDER — ATORVASTATIN CALCIUM 40 MG/1
40 TABLET, FILM COATED ORAL DAILY
Qty: 90 TABLET | Refills: 0 | Status: SHIPPED | OUTPATIENT
Start: 2023-11-10 | End: 2024-04-22 | Stop reason: SDUPTHER

## 2023-11-10 NOTE — TELEPHONE ENCOUNTER
----- Message from Festus Tiara sent at 11/9/2023  5:24 PM EST -----  Regarding: Refill medication  Contact: [phone]  Tiara,     Could I please get a refill on the generic Advair and Atorvastatin.  Thank you.

## 2023-11-14 ENCOUNTER — TELEPHONE (OUTPATIENT)
Dept: PRIMARY CARE | Facility: CLINIC | Age: 58
End: 2023-11-14

## 2023-11-14 NOTE — TELEPHONE ENCOUNTER
----- Message from Festus Menjivar sent at 11/14/2023 11:52 AM EST -----  Regarding: Generic Advair  Contact: [phone]  Good morning, I am trying to get a refill on the above, presently I do not have insurance the Advair is over $400 and they do not have the generic in stock, I at one time used Symbicort.  Not sure if this is an alternative.  I am completely out as the pharmacy did not inform me until today of the situation.

## 2023-11-16 DIAGNOSIS — F41.9 ANXIETY: ICD-10-CM

## 2023-11-16 DIAGNOSIS — M25.561 CHRONIC PAIN OF RIGHT KNEE: ICD-10-CM

## 2023-11-16 DIAGNOSIS — G89.29 CHRONIC PAIN OF RIGHT KNEE: ICD-10-CM

## 2023-11-16 RX ORDER — DIAZEPAM 5 MG/1
5 TABLET ORAL 2 TIMES DAILY PRN
Qty: 32 TABLET | Refills: 0 | Status: SHIPPED | OUTPATIENT
Start: 2023-11-16 | End: 2023-12-01 | Stop reason: SDUPTHER

## 2023-11-16 RX ORDER — HYDROCODONE BITARTRATE AND ACETAMINOPHEN 7.5; 325 MG/1; MG/1
1 TABLET ORAL 3 TIMES DAILY PRN
Qty: 48 TABLET | Refills: 0 | Status: SHIPPED | OUTPATIENT
Start: 2023-11-16 | End: 2023-12-01 | Stop reason: SDUPTHER

## 2023-11-16 NOTE — TELEPHONE ENCOUNTER
----- Message from Festus Menjivar sent at 11/15/2023  8:30 PM EST -----  Regarding: Norco and Diazepam   Contact: [phone]  Good morning,  could I please get a refill on the above medications?  I do believe I am due for my 3 month check up,  I did schedule it for December 6th.  I am in between insurance coverage and will not have any insurance until  December 1st and that was the soonest I can in. If my schedule frees up and I can make it sooner, I will try to do that.  I’m not sure if we could do a virtual visit? Thank you!  I still have some Diazepam left, but most likely would run out before December 6th.  Thanks

## 2023-11-29 NOTE — PROGRESS NOTES
Subjective   Patient ID: Festus Menjivar is a 58 y.o. female who presents for Knee Pain and Anxiety.  HPI  Patient presents today for a follow-up on Chronic Knee Pain. Is taking Norco 7.5-325 MG. States she has no concerns with this medication.  Reports that the medication gives 80% pain control/relief. OARRS reviewed today. Controlled substance contract signed on 2-9-23. UDS 2-9-23.    Also presents today for a follow-up on Anxiety. Is taking Valium 5 MG. No issues with the medication. Reports that the medication gives 100% anxiety control/relief. OARRS reviewed today. Controlled substance contract signed on 2-9-23. UDS 2-9-23.    Declined flu  Has no other new problem /question.    Advanced Care Planning  Festus Menjivar and I discussed their advance care plan preferences such as living will, and durable health care power of , which include: yes Attempt Resuscitation, yes Intubate & Ventilate, yes Comfort Care or Life- Sustaning Care. I reminded patient to talk with their health care agent, Ex , about their health care goals. Note reflects patient's free will and care goals as expressed to me.     Review of Systems  Constitutional:  no chills, no fever and no night sweats.  Eyes: no blurred vision and no eyesight problems.  ENT: no hearing loss, no nasal congestion, no hoarseness and no sore throat.  Neck: no mass (es) and no swelling.  Cardiovascular: no chest pain, no intermittent leg claudication, no lower extremity edema, no palpitation and no syncope.  Respiratory: no cough, no shortness of breath during exertion, no shortness of breath at rest and no wheezing.  Gastrointestinal: no abdominal pain, no blood in stools, no constipation, no diarrhea, no melena, no nausea, no rectal pain and no vomiting.  Genitourinary: no dysuria, no change in urinary frequency, no urinary hesitancy and no feelings of urinary urgency.  Musculoskeletal: no arthralgias, no back pain and no myalgias.  Integumentary: no  new skin lesions and no rashes.  Neurological: no difficulty walking, no headache, no limb weakness, no numbness and no tingling.  Psychiatric/Behavioral: no anxiety, no depression, no anhedonia and no substance use disorders.  Endocrine: no recent weight gain and no recent weight loss.  Hematologic/Lymphatic: no tendency for easy bruising and no swollen glands    Objective   Physical Exam  Patient in for follow-up chronic knee pain anxiety.  Just got a new job after this the last time she has her appointment with orthopedics to consider knee replacement surgery.  Physical exam today's office visit constitutional alert and oriented x3.    Head is atraumatic HEENT is within normal limits.    Neck supple no masses full range of motion.    Thyroid is normal in size no thyromegaly there is no carotid bruits.    Pulmonary exam shows clear to auscultation no respiratory distress.    Cardiovascular shows no murmur rub or gallop.  Regular rate and rhythm.    Abdominal exam soft nontender no hepatosplenomegaly or masses normal bowel sounds no rebound no guarding.    Musculoskeletal exam no joint pain no muscle pain full range of motion.    Psych exam normal mood and affect.    Dermatologic exam no skin lesions no rash no blemishes.    Neuro exam is no focal deficits.  Normal exam.    Extremities no edema normal pulses normal capillary refill.  Chronic knee pain occasional effusion crepitus with passive flexion and extension.  Anxiety controlled.  There were no vitals taken for this visit.    Lab Results   Component Value Date    WBC 6.9 11/10/2022    HGB 14.2 11/10/2022    HCT 43.6 11/10/2022    MCV 95 11/10/2022     11/10/2022       Assessment/Plan plan continue current medication follow-up after she sees Ortho or in 3 months as needed  Problem List Items Addressed This Visit       Chronic pain of left knee - Primary    Generalized anxiety disorder    Hyperlipidemia     Other Visit Diagnoses       Chronic pain of  right knee        Routine general medical examination at a health care facility        Post-menopausal

## 2023-12-01 ENCOUNTER — OFFICE VISIT (OUTPATIENT)
Dept: PRIMARY CARE | Facility: CLINIC | Age: 58
End: 2023-12-01
Payer: COMMERCIAL

## 2023-12-01 VITALS
RESPIRATION RATE: 16 BRPM | OXYGEN SATURATION: 100 % | BODY MASS INDEX: 34.55 KG/M2 | HEIGHT: 60 IN | TEMPERATURE: 97.7 F | SYSTOLIC BLOOD PRESSURE: 136 MMHG | DIASTOLIC BLOOD PRESSURE: 88 MMHG | HEART RATE: 102 BPM | WEIGHT: 176 LBS

## 2023-12-01 DIAGNOSIS — J45.909 ASTHMA, UNSPECIFIED ASTHMA SEVERITY, UNSPECIFIED WHETHER COMPLICATED, UNSPECIFIED WHETHER PERSISTENT (HHS-HCC): ICD-10-CM

## 2023-12-01 DIAGNOSIS — Z78.0 POST-MENOPAUSAL: ICD-10-CM

## 2023-12-01 DIAGNOSIS — M25.561 CHRONIC PAIN OF RIGHT KNEE: ICD-10-CM

## 2023-12-01 DIAGNOSIS — E78.5 HYPERLIPIDEMIA, UNSPECIFIED HYPERLIPIDEMIA TYPE: ICD-10-CM

## 2023-12-01 DIAGNOSIS — Z00.00 ROUTINE GENERAL MEDICAL EXAMINATION AT A HEALTH CARE FACILITY: ICD-10-CM

## 2023-12-01 DIAGNOSIS — M25.562 CHRONIC PAIN OF LEFT KNEE: Primary | ICD-10-CM

## 2023-12-01 DIAGNOSIS — G89.29 CHRONIC PAIN OF LEFT KNEE: Primary | ICD-10-CM

## 2023-12-01 DIAGNOSIS — G89.29 CHRONIC PAIN OF RIGHT KNEE: ICD-10-CM

## 2023-12-01 DIAGNOSIS — F41.9 ANXIETY: ICD-10-CM

## 2023-12-01 DIAGNOSIS — F41.1 GENERALIZED ANXIETY DISORDER: ICD-10-CM

## 2023-12-01 PROCEDURE — 99213 OFFICE O/P EST LOW 20 MIN: CPT | Performed by: FAMILY MEDICINE

## 2023-12-01 PROCEDURE — 1036F TOBACCO NON-USER: CPT | Performed by: FAMILY MEDICINE

## 2023-12-01 RX ORDER — HYDROCODONE BITARTRATE AND ACETAMINOPHEN 7.5; 325 MG/1; MG/1
1 TABLET ORAL 3 TIMES DAILY PRN
Qty: 90 TABLET | Refills: 0 | Status: SHIPPED | OUTPATIENT
Start: 2023-12-01 | End: 2023-12-27 | Stop reason: SDUPTHER

## 2023-12-01 RX ORDER — FLUTICASONE PROPIONATE AND SALMETEROL 250; 50 UG/1; UG/1
1 POWDER RESPIRATORY (INHALATION)
Qty: 60 EACH | Refills: 1 | Status: SHIPPED | OUTPATIENT
Start: 2023-12-01

## 2023-12-01 RX ORDER — DIAZEPAM 5 MG/1
5 TABLET ORAL 2 TIMES DAILY PRN
Qty: 60 TABLET | Refills: 0 | Status: SHIPPED | OUTPATIENT
Start: 2023-12-01 | End: 2023-12-27 | Stop reason: SDUPTHER

## 2023-12-04 ENCOUNTER — APPOINTMENT (OUTPATIENT)
Dept: PRIMARY CARE | Facility: CLINIC | Age: 58
End: 2023-12-04
Payer: COMMERCIAL

## 2023-12-06 ENCOUNTER — APPOINTMENT (OUTPATIENT)
Dept: PRIMARY CARE | Facility: CLINIC | Age: 58
End: 2023-12-06
Payer: COMMERCIAL

## 2023-12-27 DIAGNOSIS — M25.561 CHRONIC PAIN OF RIGHT KNEE: ICD-10-CM

## 2023-12-27 DIAGNOSIS — G89.29 CHRONIC PAIN OF RIGHT KNEE: ICD-10-CM

## 2023-12-27 DIAGNOSIS — F41.9 ANXIETY: ICD-10-CM

## 2023-12-27 NOTE — TELEPHONE ENCOUNTER
Norco & Diazepam last filled 12-1-23. OK for early fill? RX's pending if okay. Please advise.    ----- Message from Festus Menjivar sent at 12/27/2023  9:25 AM EST -----  Regarding: Medication refill  Norco and Diazepam   Contact: [phone]  Good morning,    I just realized I will be out of town when these two prescriptions are scheduled to be refilled.  I did check with my new insurance and I do have early vacation refills, I think though it’s ultimately up to the pharmacy but wondered if Dr. Hancock would maxwell them for an early refill.  I hope you had a nice holiday.    Thank you.

## 2023-12-28 RX ORDER — HYDROCODONE BITARTRATE AND ACETAMINOPHEN 7.5; 325 MG/1; MG/1
1 TABLET ORAL 3 TIMES DAILY PRN
Qty: 90 TABLET | Refills: 0 | Status: SHIPPED | OUTPATIENT
Start: 2023-12-28 | End: 2024-01-25 | Stop reason: SDUPTHER

## 2023-12-28 RX ORDER — DIAZEPAM 5 MG/1
5 TABLET ORAL 2 TIMES DAILY PRN
Qty: 60 TABLET | Refills: 0 | Status: SHIPPED | OUTPATIENT
Start: 2023-12-28 | End: 2024-01-25 | Stop reason: SDUPTHER

## 2024-01-25 DIAGNOSIS — M25.561 CHRONIC PAIN OF RIGHT KNEE: ICD-10-CM

## 2024-01-25 DIAGNOSIS — G89.29 CHRONIC PAIN OF RIGHT KNEE: ICD-10-CM

## 2024-01-25 DIAGNOSIS — F41.9 ANXIETY: ICD-10-CM

## 2024-01-25 RX ORDER — HYDROCODONE BITARTRATE AND ACETAMINOPHEN 7.5; 325 MG/1; MG/1
1 TABLET ORAL 3 TIMES DAILY PRN
Qty: 90 TABLET | Refills: 0 | Status: SHIPPED | OUTPATIENT
Start: 2024-01-25 | End: 2024-02-20 | Stop reason: SDUPTHER

## 2024-01-25 RX ORDER — DIAZEPAM 5 MG/1
5 TABLET ORAL 2 TIMES DAILY PRN
Qty: 60 TABLET | Refills: 0 | Status: SHIPPED | OUTPATIENT
Start: 2024-01-25 | End: 2024-02-20 | Stop reason: SDUPTHER

## 2024-01-25 NOTE — TELEPHONE ENCOUNTER
----- Message from Festus Menjivar sent at 1/25/2024  9:40 AM EST -----  Regarding: Refills of Norco and Diazapam  Contact: [phone]  Is it just paperwork that needs signed and I can do the urine test at my next visit.  If so, can you email me them?  Like I mentioned I have been going to Dr. Hancock for years and I have never been told this.

## 2024-01-25 NOTE — TELEPHONE ENCOUNTER
----- Message from Festus Menjivar sent at 1/24/2024  9:03 PM EST -----  Regarding: Refills of Norco and Diazapam  Contact: [phone]  Can I please get a refill on these two medications. Thank you.

## 2024-02-08 NOTE — PROGRESS NOTES
Subjective   Patient ID: Festus Menjivar is a 58 y.o. female who presents for No chief complaint on file..  HPI    Patient presents today for a follow-up on Chronic Knee Pain.  Is taking Norco 7.5-325 MG.   States she has no concerns with this medication.   Reports that the medication gives ***% pain control/relief.   OARRS reviewed today.  Controlled substance contract signed today   UDS completed today      Also presents today for a follow-up on Anxiety.   Is taking Valium 5 MG.   No issues with the medication.   Reports that the medication gives ***% anxiety control/relief  OARRS reviewed today.   Controlled substance contract signed on today   UDS completed today        Review of Systems  Constitutional:  no chills, no fever and no night sweats.  Eyes: no blurred vision and no eyesight problems.  ENT: no hearing loss, no nasal congestion, no hoarseness and no sore throat.  Neck: no mass (es) and no swelling.  Cardiovascular: no chest pain, no intermittent leg claudication, no lower extremity edema, no palpitation and no syncope.  Respiratory: no cough, no shortness of breath during exertion, no shortness of breath at rest and no wheezing.  Gastrointestinal: no abdominal pain, no blood in stools, no constipation, no diarrhea, no melena, no nausea, no rectal pain and no vomiting.  Genitourinary: no dysuria, no change in urinary frequency, no urinary hesitancy and no feelings of urinary urgency.  Musculoskeletal: no arthralgias, no back pain and no myalgias.  Integumentary: no new skin lesions and no rashes.  Neurological: no difficulty walking, no headache, no limb weakness, no numbness and no tingling.  Psychiatric/Behavioral: no anxiety, no depression, no anhedonia and no substance use disorders.  Endocrine: no recent weight gain and no recent weight loss.  Hematologic/Lymphatic: no tendency for easy bruising and no swollen glands    Objective   Physical Exam    There were no vitals taken for this visit.    Lab  Results   Component Value Date    WBC 6.9 11/10/2022    HGB 14.2 11/10/2022    HCT 43.6 11/10/2022    MCV 95 11/10/2022     11/10/2022       Assessment/Plan   Problem List Items Addressed This Visit    None

## 2024-02-09 ENCOUNTER — APPOINTMENT (OUTPATIENT)
Dept: PRIMARY CARE | Facility: CLINIC | Age: 59
End: 2024-02-09
Payer: COMMERCIAL

## 2024-02-12 NOTE — PROGRESS NOTES
Subjective   Patient ID: Festus Menjivar is a 58 y.o. female who presents for Anxiety and Knee Pain.  HPI    Anxiety follow up  Taking the diazepam   Working well   100 % effective   Denies any side effects   OARRS reviewed today   CSA signed today  Last took last night    Patient presents for chronic pain of right knee. Is currently taking norco. Rates the pain a 8/10 over the past 7 days. Reports that the medication gives 50% pain control/relief. OARRS reviewed today, CSA signed today. Last took this afternoon. Patient had a dental procedure and Novocain was used.     Patient will be going to see orthopedics to see about getting surgery on her left knee. Patient would like to know how she would go about getting a higher dose incase her current prescription of Norco does not work after the surgery.     Taking current medications which were reviewed.  Problem list discussed.    Overall doing well.  Eating okay.  Staying active.    Has no other new problem /question.     ROS  Constitutional- No activity change. No appetite change.  Eyes- Denies vision changes.  Respiratory- No shortness of breath.  Cardiovascular- No palpitations. No chest pain.  GI- No nausea or vomiting. No diarrhea or constipation. Denies abdominal pain.  Musculoskeletal- Denies joint swelling.  Extremities- No edema.  Neurological- Denies headaches. Denies dizziness.  Skin- No rashes.  Psychiatric/Behavioral- Denies significant anxiety, or depressed mood.     Objective     /78   Pulse 105   Resp 18   Ht 1.524 m (5')   Wt 77.8 kg (171 lb 9.6 oz)   SpO2 98%   BMI 33.51 kg/m²     Allergies   Allergen Reactions    Amoxicillin Rash       Constitutional-- Well-nourished.  No distress  Head- unremarkable.  Ears- TMs clear.  Eyes- PERRL.  Conjunctiva normal.  Nose- Normal.  No rhinorrhea noted.  Throat- Oropharynx is clear and moist.  Neck- Supple with no thyromegaly.  No significant cervical adenopathy noted.  Pulmonary/Chest- Breath sounds  normal with normal effort.  No wheezing.  Heart- Regular rate and rhythm.  No murmur.  Abdomen- Soft and non-tender.  No masses noted.  Musculoskeletal- Normal ROM.  No significant joint swelling  Extremities- No edema.   Neurological- Alert.  No noted deficits.  Skin- Warm.  No rashes.  Psychiatric/Behavioral- Mood and affect normal.  Behavior normal.   Patient with complaints of worsening left knee pain has an appointment to see orthopedics tripped and fell did the splits and twisted her left knee more swollen and painful.  Requesting increased amount of pain medication until she sees orthopedics we will go ahead and allow her to take 4-day for the short period of time she is aware when she gets the knee surgery drawn the goal is to wean her off of all of this medication.  Otherwise stable she has right-sided facial droop and drooling had injection to get a root canal today and anesthesia has not resolved.  Difficulty closing her mouth.  Assessment/Plan await orthopedic evaluation.  1. Generalized anxiety disorder        2. Hyperlipidemia, unspecified hyperlipidemia type        3. Chronic pain of right knee  HYDROcodone-acetaminophen (Norco) 7.5-325 mg tablet      4. Therapeutic drug monitoring  Opiate/Opioid/Benzo Prescription Compliance    Opiate/Opioid/Benzo Prescription Compliance    OOB Internal Tracking    CANCELED: Opiate/Opioid/Benzo Prescription Compliance    CANCELED: OOB Internal Tracking      5. Anxiety  diazePAM (Valium) 5 mg tablet             Long talk. Treatment options reviewed.    Continue and take your medications as prescribed.    Health Maintenance issues discussed.    Importance of healthy diet and regular exercise regimen discussed.    We will contact you with any test results ordered. If you do not hear from us, please contact.    Follow-up as instructed or sooner if any problems or symptoms do not resolve as expected.

## 2024-02-13 ENCOUNTER — APPOINTMENT (OUTPATIENT)
Dept: PRIMARY CARE | Facility: CLINIC | Age: 59
End: 2024-02-13
Payer: COMMERCIAL

## 2024-02-16 ENCOUNTER — TELEPHONE (OUTPATIENT)
Dept: PRIMARY CARE | Facility: CLINIC | Age: 59
End: 2024-02-16
Payer: COMMERCIAL

## 2024-02-16 NOTE — TELEPHONE ENCOUNTER
----- Message from Festus Menjivar sent at 2/16/2024  9:10 AM EST -----  Regarding: [place]   Contact: [phone]  Good morning.  I have a question i wanted to ask, my actual 3 month appointment isn’t due until March, I was told I needed to come in sooner for the paperwork for the Norco I take.  I know the paper states only Dr. Hancock can prescribe this to me.   I have finally decided to get my knee replacement and I have an appointment  with Dr. Murphy next week.  Can I wait until My 3 month visit to sign this paperwork  as I believe I may be under Dr. Murphy’s care and I am sure after the surgery he would have me on a pain meds.  I do not know how this would work?  I am hoping to have the surgery ASAP, as my knee has gotten worse.  Please advise if I can wait until My regular 3 month appointment in March as I would definitely know by then when my surgery would be or perhaps (fingers crossed) I may have the surgery before then.   Thank you!  Sorry for the long message.

## 2024-02-19 ENCOUNTER — APPOINTMENT (OUTPATIENT)
Dept: PRIMARY CARE | Facility: CLINIC | Age: 59
End: 2024-02-19
Payer: COMMERCIAL

## 2024-02-20 ENCOUNTER — OFFICE VISIT (OUTPATIENT)
Dept: PRIMARY CARE | Facility: CLINIC | Age: 59
End: 2024-02-20
Payer: COMMERCIAL

## 2024-02-20 VITALS
OXYGEN SATURATION: 98 % | BODY MASS INDEX: 33.69 KG/M2 | HEIGHT: 60 IN | HEART RATE: 105 BPM | SYSTOLIC BLOOD PRESSURE: 120 MMHG | WEIGHT: 171.6 LBS | DIASTOLIC BLOOD PRESSURE: 78 MMHG | RESPIRATION RATE: 18 BRPM

## 2024-02-20 DIAGNOSIS — E78.5 HYPERLIPIDEMIA, UNSPECIFIED HYPERLIPIDEMIA TYPE: ICD-10-CM

## 2024-02-20 DIAGNOSIS — F41.1 GENERALIZED ANXIETY DISORDER: ICD-10-CM

## 2024-02-20 DIAGNOSIS — G89.29 CHRONIC PAIN OF RIGHT KNEE: ICD-10-CM

## 2024-02-20 DIAGNOSIS — Z51.81 THERAPEUTIC DRUG MONITORING: ICD-10-CM

## 2024-02-20 DIAGNOSIS — F41.9 ANXIETY: ICD-10-CM

## 2024-02-20 DIAGNOSIS — M25.561 CHRONIC PAIN OF RIGHT KNEE: ICD-10-CM

## 2024-02-20 PROCEDURE — 80373 DRUG SCREENING TRAMADOL: CPT

## 2024-02-20 PROCEDURE — 80358 DRUG SCREENING METHADONE: CPT

## 2024-02-20 PROCEDURE — 82570 ASSAY OF URINE CREATININE: CPT

## 2024-02-20 PROCEDURE — 1036F TOBACCO NON-USER: CPT | Performed by: FAMILY MEDICINE

## 2024-02-20 PROCEDURE — 80365 DRUG SCREENING OXYCODONE: CPT

## 2024-02-20 PROCEDURE — 99213 OFFICE O/P EST LOW 20 MIN: CPT | Performed by: FAMILY MEDICINE

## 2024-02-20 PROCEDURE — 80368 SEDATIVE HYPNOTICS: CPT

## 2024-02-20 PROCEDURE — 80346 BENZODIAZEPINES1-12: CPT

## 2024-02-20 PROCEDURE — 80361 OPIATES 1 OR MORE: CPT

## 2024-02-20 PROCEDURE — 80307 DRUG TEST PRSMV CHEM ANLYZR: CPT

## 2024-02-20 PROCEDURE — 80354 DRUG SCREENING FENTANYL: CPT

## 2024-02-20 RX ORDER — HYDROCODONE BITARTRATE AND ACETAMINOPHEN 7.5; 325 MG/1; MG/1
1 TABLET ORAL EVERY 6 HOURS PRN
Qty: 120 TABLET | Refills: 0 | Status: SHIPPED | OUTPATIENT
Start: 2024-02-20 | End: 2024-03-20 | Stop reason: SDUPTHER

## 2024-02-20 RX ORDER — DIAZEPAM 5 MG/1
5 TABLET ORAL 2 TIMES DAILY PRN
Qty: 60 TABLET | Refills: 2 | Status: SHIPPED | OUTPATIENT
Start: 2024-02-20 | End: 2024-03-20 | Stop reason: SDUPTHER

## 2024-02-21 ENCOUNTER — HOSPITAL ENCOUNTER (OUTPATIENT)
Dept: RADIOLOGY | Facility: CLINIC | Age: 59
Discharge: HOME | End: 2024-02-21
Payer: COMMERCIAL

## 2024-02-21 ENCOUNTER — OFFICE VISIT (OUTPATIENT)
Dept: ORTHOPEDIC SURGERY | Facility: CLINIC | Age: 59
End: 2024-02-21
Payer: COMMERCIAL

## 2024-02-21 DIAGNOSIS — M25.562 LEFT KNEE PAIN, UNSPECIFIED CHRONICITY: ICD-10-CM

## 2024-02-21 DIAGNOSIS — M17.12 PRIMARY OSTEOARTHRITIS OF LEFT KNEE: Primary | ICD-10-CM

## 2024-02-21 LAB
AMPHETAMINES UR QL SCN: NORMAL
BARBITURATES UR QL SCN: NORMAL
BZE UR QL SCN: NORMAL
CANNABINOIDS UR QL SCN: NORMAL
CREAT UR-MCNC: 35.8 MG/DL (ref 20–320)
PCP UR QL SCN: NORMAL

## 2024-02-21 PROCEDURE — 73564 X-RAY EXAM KNEE 4 OR MORE: CPT | Mod: LT

## 2024-02-21 PROCEDURE — 73564 X-RAY EXAM KNEE 4 OR MORE: CPT | Mod: LEFT SIDE | Performed by: ORTHOPAEDIC SURGERY

## 2024-02-21 PROCEDURE — 99204 OFFICE O/P NEW MOD 45 MIN: CPT | Performed by: ORTHOPAEDIC SURGERY

## 2024-02-21 PROCEDURE — 99214 OFFICE O/P EST MOD 30 MIN: CPT | Performed by: ORTHOPAEDIC SURGERY

## 2024-02-21 PROCEDURE — 1036F TOBACCO NON-USER: CPT | Performed by: ORTHOPAEDIC SURGERY

## 2024-02-22 NOTE — PROGRESS NOTES
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  She been told she has knee arthritis is getting progressively worse she has had extensive conservative treatment with gel injections she does see pain management knee pain is getting progressively worse she is having difficulty bearing weight difficulty standing for long period time severe impairment of bodily function related to her left knee arthritis    Location of pain: Both knees left greater than right  Quality of pain: Chronic pain greater than 15 years getting progressively worse  Modifying factors: Worse with weightbearing better with rest  Associated signs and symptoms: Noticing more more deformity a lot of popping clicking grinding and swelling no numbness or tingling  Previous treatment: She has had gel injections in the past as well as pain management as well as anti-inflammatory medication        The patient's past medical history, family history, social history, and review of systems were documented on the patient's medical intake form.  The medical intake form was reviewed and scanned into the electronic medical record for future use.  History is otherwise negative except as stated in the HPI.    Physical exam    General: Alert and oriented to place, person, and time.  No acute distress and breathing comfortably; pleasant and cooperative with the examination.  HEENT: Head is normocephalic and atraumatic.  Neck: Supple, no visible swelling.  Cardiovascular: Good perfusion to the affected extremity.  Lungs: No audible wheezing or labored breathing.  Abdomen: Nondistended  HEME/Lymph : No visible abnormalities bilateral lower extremity    Extremity:  The affected knee was examined and inspected and  was tender to touch along the medial aspect.  The patient has catching/locking and occasional mechanical symptoms.  The skin was intact without breakdown or open wounds.  There was a mild Kelsey exam seen with mild evidence of instability and weakness in the collateral ligaments with laxity to varus valgus stress and in the anterior posterior plane.  There was a negative Lachman's test, pivot shift test, and posterior drawer sign.  There was no foot drop, numbness or tingling.  Sensation, reflexes, and pulses in the foot and ankle were present.  There was an effusion but range of motion was good and straight leg raise testing was normal.   The patient had the ability to bear weight but with discomfort.  The patient's gait was antalgic secondary to the discomfort. Knee range of motion was  with significant varus deformity    Diagnostics:      XR knee left 4+ views    Result Date: 2/21/2024  Interpreted By:  Jagruti Murphy, STUDY: XR KNEE LEFT 4+ VIEWS;  ;  2/21/2024 1:37 pm   INDICATION: Signs/Symptoms:pain.   ACCESSION NUMBER(S): [id number]   ORDERING CLINICIAN: JAGRUTI MURPHY   FINDINGS: Left knee weightbearing four views. Severe knee arthritis with varus deformities over 15 degrees bone-on-bone articulation subchondral cyst formation and large osteophyte formation no signs of fracture dislocation or other bony abnormality     Signed by: Jagruti Murphy 2/21/2024 2:04 PM Dictation workstation:   [id number]         Procedures  [none   ]    Assessment:  Left knee arthritis with severe varus deformity    Treatment plan:  1.  The natural history of the condition and its associated treatment alternatives including surgical and nonsurgical options were discussed with the patient at length.  2.  Patient with severe impairment of bodily function related to her left knee arthritis refractory to conservative treatment discussed surgical and nonsurgical option.  The procedures risk benefits treatment  alternatives and postoperative course were discussed with her she understands and wishes to proceed she (schedule total knee replacement.  She is interested in same-day discharge and likely a candidate for the ambulatory surgery center when available.  She would like to do cortisone injection in her contralateral knee just prior to the procedure.  3.  Patient has failed all forms of conservative treatment.  The joint pain is significantly affecting quality of life and activities of daily living.    The patient has pain in the joint that is increased with activity and weightbearing, and walks with an antalgic gait.  The pain is interfering with activities of daily living.  Patient has limited range of motion and pain with passive range of motion.  X-rays demonstrate joint space narrowing, subchondral sclerosis and osteophyte formation.  Symptoms are not improving with medication, physical therapy or supportive device for a period of at least 3 months.  Weight loss and smoking cessation have been discussed with the patient.  Patient advised on when to cease smoking 6-8 weeks before the procedure.      Patient would like to go and schedule joint replacement surgery.  The procedure its risks, benefits, and treatment alternatives were discussed at length and patient would like to proceed.  Patient is going to schedule the procedure at their earliest convenience and see me back for a preop visit just prior.  Preadmission testing, medical checkup, and insurance authorization will be performed in the meantime.  Patient understands a joint replacement surgery is a last resort, although a very successful operation results are not guaranteed.  4.  All of the patient's questions were answered.    Orders Placed This Encounter    XR knee left 4+ views       This note was prepared using voice recognition software.  The details of this note are correct and have been reviewed, and corrected to the best of my ability.  Some  grammatical areas may persist related to the Dragon software    Darrion Murphy MD  Senior Attending Physician  [facility]    [phone]

## 2024-02-25 LAB
1OH-MIDAZOLAM UR CFM-MCNC: <25 NG/ML
6MAM UR CFM-MCNC: <25 NG/ML
7AMINOCLONAZEPAM UR CFM-MCNC: <25 NG/ML
A-OH ALPRAZ UR CFM-MCNC: <25 NG/ML
ALPRAZ UR CFM-MCNC: <25 NG/ML
CHLORDIAZEP UR CFM-MCNC: <25 NG/ML
CLONAZEPAM UR CFM-MCNC: <25 NG/ML
CODEINE UR CFM-MCNC: <50 NG/ML
DIAZEPAM UR CFM-MCNC: <25 NG/ML
EDDP UR CFM-MCNC: <25 NG/ML
FENTANYL UR CFM-MCNC: <2.5 NG/ML
HYDROCODONE CTO UR CFM-MCNC: 1057 NG/ML
HYDROMORPHONE UR CFM-MCNC: 158 NG/ML
LORAZEPAM UR CFM-MCNC: <25 NG/ML
METHADONE UR CFM-MCNC: <25 NG/ML
MIDAZOLAM UR CFM-MCNC: <25 NG/ML
MORPHINE UR CFM-MCNC: <50 NG/ML
NORDIAZEPAM UR CFM-MCNC: 307 NG/ML
NORFENTANYL UR CFM-MCNC: <2.5 NG/ML
NORHYDROCODONE UR CFM-MCNC: 813 NG/ML
NOROXYCODONE UR CFM-MCNC: <25 NG/ML
NORTRAMADOL UR-MCNC: <50 NG/ML
OXAZEPAM UR CFM-MCNC: 678 NG/ML
OXYCODONE UR CFM-MCNC: <25 NG/ML
OXYMORPHONE UR CFM-MCNC: <25 NG/ML
TEMAZEPAM UR CFM-MCNC: 701 NG/ML
TRAMADOL UR CFM-MCNC: <50 NG/ML
ZOLPIDEM UR CFM-MCNC: <25 NG/ML
ZOLPIDEM UR-MCNC: <25 NG/ML

## 2024-03-20 ENCOUNTER — TRANSCRIBE ORDERS (OUTPATIENT)
Dept: ORTHOPEDIC SURGERY | Facility: CLINIC | Age: 59
End: 2024-03-20
Payer: COMMERCIAL

## 2024-03-20 DIAGNOSIS — G89.29 CHRONIC PAIN OF RIGHT KNEE: ICD-10-CM

## 2024-03-20 DIAGNOSIS — F41.9 ANXIETY: ICD-10-CM

## 2024-03-20 DIAGNOSIS — M17.12 UNILATERAL PRIMARY OSTEOARTHRITIS, LEFT KNEE: Primary | ICD-10-CM

## 2024-03-20 DIAGNOSIS — M25.561 CHRONIC PAIN OF RIGHT KNEE: ICD-10-CM

## 2024-03-20 RX ORDER — HYDROCODONE BITARTRATE AND ACETAMINOPHEN 7.5; 325 MG/1; MG/1
1 TABLET ORAL EVERY 6 HOURS PRN
Qty: 120 TABLET | Refills: 0 | Status: SHIPPED | OUTPATIENT
Start: 2024-03-20 | End: 2024-04-16 | Stop reason: SDUPTHER

## 2024-03-20 RX ORDER — DIAZEPAM 5 MG/1
5 TABLET ORAL 2 TIMES DAILY PRN
Qty: 60 TABLET | Refills: 2 | Status: SHIPPED | OUTPATIENT
Start: 2024-03-20 | End: 2024-04-29

## 2024-03-20 NOTE — TELEPHONE ENCOUNTER
----- Message from Festus Menjivar sent at 3/20/2024  9:07 AM EDT -----  Regarding: Norco and Diazepam   Contact: [phone]  Good morning.  Can I please get a refill on these medications?  Doctor changed my Norco to 120 pills to be taken every six hours.  I have scheduled my knee replacement surgery it is April 29th.  I’m not sure if you guys can see the x-ray, Dr. Murphy Couldn’t believe how bad it was for someone my age, bone on bone and 17.5 degrees off.  I guess that explains my pain.  They are going to move me up on the surgery schedule if they get an opening.   Thank you

## 2024-04-02 ENCOUNTER — APPOINTMENT (OUTPATIENT)
Dept: PRIMARY CARE | Facility: CLINIC | Age: 59
End: 2024-04-02
Payer: COMMERCIAL

## 2024-04-09 NOTE — PROGRESS NOTES
Subjective   Patient ID: Festus Menjivar is a 58 y.o. female who presents for Pre-op Exam.    HPI    Patient presents today for pre-op exam    Surgeon- Dr. Murphy    Date- 4/29/24  Facility- [facility]  Procedure- Left knee total arthroplasty     Pt is going to pre admission done including EKG, and BW 4/16/24    Patient is not currently under the care of Cardiology  She states she has had her asthma kick in when she was under twice, and has had to have her inhaler.  Otherwise no other issues with spinals.    No other questions and/or concerns for today's visit.      Review of systems  ; Patient seen today for exam denies any problems with headaches or vision, denies any shortness of breath chest pain nausea or vomiting, no black stool no blood in the stool no heartburn type symptoms denies any problems with constipation or diarrhea, and no dysuria-type symptoms    The patient's allergies medications were reviewed with them today    The patient's social family and surgical history or also reviewed here today, along with her past medical history.     Objective     Alert and active in  no acute distress  HEENT TMs clear oropharynx negative nares clear no drainage noted neck supple  With no adenopathy   Heart regular rate and rhythm without murmur and no carotid bruits  Lungs- clear to auscultation bilaterally, no wheeze or rhonchi noted  Thyroid -negative masses or nodularity  Abdomen- soft times four quadrants , bowel sounds positive no masses or organomegaly, negative tenderness guarding or rebound  Neurological exam unremarkable- DTRs in upper and lower extremities within normal limits.   skin -no lesions noted  Left knee has decreased motion  Valgus noted consistent with severe OA      /72 (BP Location: Left arm, Patient Position: Sitting, BP Cuff Size: Adult)   Pulse 109   Temp 36.4 °C (97.6 °F) (Temporal)   Resp 16   Ht 1.524 m (5')   Wt 77.6 kg (171 lb)   SpO2 98%   BMI 33.40 kg/m²     Allergies    Allergen Reactions    Amoxicillin Rash       Assessment/Plan   Problem List Items Addressed This Visit       Asthma    Chronic pain of left knee - Primary    Generalized anxiety disorder    Hyperlipidemia    Unilateral primary osteoarthritis, left knee    Preop examination    Relevant Orders    ECG 12 lead (Clinic Performed) (Completed)    BMI 33.0-33.9,adult    Class 1 obesity due to excess calories without serious comorbidity with body mass index (BMI) of 33.0 to 33.9 in adult     Discussed patient's BMI and to institute calorie reduction and increase exercise to decrease risk of diabetes and heart disease in the future.    EKG performed, no changes from 2013 EKG. labs are ordered and pending    Discussed taking deep breaths to prevent pneumonia.  Move feet to prevent blood clots.     Will be cleared for surgery once her labs come back her EKG has not changed in the last 11 years does not smoke no other heart disease risk but she states she is always get little bit of a fast heart rate as she has lots of energy we told her to cut back on her caffeine intake also//        Discussed medications to stop prior to procedure.    If anything worsens or changes please call us at once, follow up in the office as planned.    Scribe Attestation  By signing my name below, I, Nani Chance MA, Scribe   attest that this documentation has been prepared under the direction and in the presence of Shar Mcdonough DO.

## 2024-04-09 NOTE — H&P (VIEW-ONLY)
Subjective   Patient ID: Festus Menjivar is a 58 y.o. female who presents for Pre-op Exam.    HPI    Patient presents today for pre-op exam    Surgeon- Dr. Murphy    Date- 4/29/24  Facility- [facility]  Procedure- Left knee total arthroplasty     Pt is going to pre admission done including EKG, and BW 4/16/24    Patient is not currently under the care of Cardiology  She states she has had her asthma kick in when she was under twice, and has had to have her inhaler.  Otherwise no other issues with spinals.    No other questions and/or concerns for today's visit.      Review of systems  ; Patient seen today for exam denies any problems with headaches or vision, denies any shortness of breath chest pain nausea or vomiting, no black stool no blood in the stool no heartburn type symptoms denies any problems with constipation or diarrhea, and no dysuria-type symptoms    The patient's allergies medications were reviewed with them today    The patient's social family and surgical history or also reviewed here today, along with her past medical history.     Objective     Alert and active in  no acute distress  HEENT TMs clear oropharynx negative nares clear no drainage noted neck supple  With no adenopathy   Heart regular rate and rhythm without murmur and no carotid bruits  Lungs- clear to auscultation bilaterally, no wheeze or rhonchi noted  Thyroid -negative masses or nodularity  Abdomen- soft times four quadrants , bowel sounds positive no masses or organomegaly, negative tenderness guarding or rebound  Neurological exam unremarkable- DTRs in upper and lower extremities within normal limits.   skin -no lesions noted  Left knee has decreased motion  Valgus noted consistent with severe OA      /72 (BP Location: Left arm, Patient Position: Sitting, BP Cuff Size: Adult)   Pulse 109   Temp 36.4 °C (97.6 °F) (Temporal)   Resp 16   Ht 1.524 m (5')   Wt 77.6 kg (171 lb)   SpO2 98%   BMI 33.40 kg/m²     Allergies    Allergen Reactions    Amoxicillin Rash       Assessment/Plan   Problem List Items Addressed This Visit       Asthma    Chronic pain of left knee - Primary    Generalized anxiety disorder    Hyperlipidemia    Unilateral primary osteoarthritis, left knee    Preop examination    Relevant Orders    ECG 12 lead (Clinic Performed) (Completed)    BMI 33.0-33.9,adult    Class 1 obesity due to excess calories without serious comorbidity with body mass index (BMI) of 33.0 to 33.9 in adult     Discussed patient's BMI and to institute calorie reduction and increase exercise to decrease risk of diabetes and heart disease in the future.    EKG performed, no changes from 2013 EKG. labs are ordered and pending    Discussed taking deep breaths to prevent pneumonia.  Move feet to prevent blood clots.     Will be cleared for surgery once her labs come back her EKG has not changed in the last 11 years does not smoke no other heart disease risk but she states she is always get little bit of a fast heart rate as she has lots of energy we told her to cut back on her caffeine intake also//        Discussed medications to stop prior to procedure.    If anything worsens or changes please call us at once, follow up in the office as planned.    Scribe Attestation  By signing my name below, I, Nani Chance MA, Scribe   attest that this documentation has been prepared under the direction and in the presence of Shar Mcdonough DO.

## 2024-04-10 ENCOUNTER — OFFICE VISIT (OUTPATIENT)
Dept: PRIMARY CARE | Facility: CLINIC | Age: 59
End: 2024-04-10
Payer: COMMERCIAL

## 2024-04-10 VITALS
SYSTOLIC BLOOD PRESSURE: 122 MMHG | WEIGHT: 171 LBS | OXYGEN SATURATION: 98 % | BODY MASS INDEX: 33.57 KG/M2 | HEIGHT: 60 IN | RESPIRATION RATE: 16 BRPM | DIASTOLIC BLOOD PRESSURE: 72 MMHG | TEMPERATURE: 97.6 F | HEART RATE: 109 BPM

## 2024-04-10 DIAGNOSIS — Z01.818 PREOP EXAMINATION: ICD-10-CM

## 2024-04-10 DIAGNOSIS — F41.1 GENERALIZED ANXIETY DISORDER: ICD-10-CM

## 2024-04-10 DIAGNOSIS — J45.909 ASTHMA, UNSPECIFIED ASTHMA SEVERITY, UNSPECIFIED WHETHER COMPLICATED, UNSPECIFIED WHETHER PERSISTENT (HHS-HCC): ICD-10-CM

## 2024-04-10 DIAGNOSIS — M25.562 CHRONIC PAIN OF LEFT KNEE: Primary | ICD-10-CM

## 2024-04-10 DIAGNOSIS — E66.09 CLASS 1 OBESITY DUE TO EXCESS CALORIES WITHOUT SERIOUS COMORBIDITY WITH BODY MASS INDEX (BMI) OF 33.0 TO 33.9 IN ADULT: ICD-10-CM

## 2024-04-10 DIAGNOSIS — M17.12 UNILATERAL PRIMARY OSTEOARTHRITIS, LEFT KNEE: ICD-10-CM

## 2024-04-10 DIAGNOSIS — G89.29 CHRONIC PAIN OF LEFT KNEE: Primary | ICD-10-CM

## 2024-04-10 DIAGNOSIS — E78.5 HYPERLIPIDEMIA, UNSPECIFIED HYPERLIPIDEMIA TYPE: ICD-10-CM

## 2024-04-10 PROBLEM — E66.811 CLASS 1 OBESITY DUE TO EXCESS CALORIES WITHOUT SERIOUS COMORBIDITY WITH BODY MASS INDEX (BMI) OF 33.0 TO 33.9 IN ADULT: Status: ACTIVE | Noted: 2024-04-10

## 2024-04-10 PROCEDURE — 99214 OFFICE O/P EST MOD 30 MIN: CPT | Performed by: FAMILY MEDICINE

## 2024-04-10 PROCEDURE — 3008F BODY MASS INDEX DOCD: CPT | Performed by: FAMILY MEDICINE

## 2024-04-10 PROCEDURE — 93000 ELECTROCARDIOGRAM COMPLETE: CPT | Performed by: FAMILY MEDICINE

## 2024-04-10 PROCEDURE — 1036F TOBACCO NON-USER: CPT | Performed by: FAMILY MEDICINE

## 2024-04-12 DIAGNOSIS — M62.838 MUSCLE SPASM: ICD-10-CM

## 2024-04-12 RX ORDER — CYCLOBENZAPRINE HCL 10 MG
10 TABLET ORAL 3 TIMES DAILY PRN
Qty: 90 TABLET | Refills: 0 | Status: SHIPPED | OUTPATIENT
Start: 2024-04-12 | End: 2024-04-29 | Stop reason: HOSPADM

## 2024-04-12 NOTE — TELEPHONE ENCOUNTER
----- Message from Festus Menjivar sent at 4/12/2024  1:43 PM EDT -----  Regarding: Refill of generic flexeril  Contact: [phone]  Can I please get a refill on this?  I go to the medication section but I  am never able to chose a medication to renew, sorry if I am doing this wrong.

## 2024-04-16 ENCOUNTER — HOSPITAL ENCOUNTER (OUTPATIENT)
Dept: RADIOLOGY | Facility: HOSPITAL | Age: 59
Discharge: HOME | End: 2024-04-16
Payer: COMMERCIAL

## 2024-04-16 ENCOUNTER — PRE-ADMISSION TESTING (OUTPATIENT)
Dept: PREADMISSION TESTING | Facility: HOSPITAL | Age: 59
End: 2024-04-16
Payer: COMMERCIAL

## 2024-04-16 VITALS
DIASTOLIC BLOOD PRESSURE: 83 MMHG | OXYGEN SATURATION: 98 % | SYSTOLIC BLOOD PRESSURE: 150 MMHG | RESPIRATION RATE: 18 BRPM | WEIGHT: 169.97 LBS | HEART RATE: 104 BPM | TEMPERATURE: 97.9 F | BODY MASS INDEX: 33.2 KG/M2

## 2024-04-16 DIAGNOSIS — M25.561 CHRONIC PAIN OF RIGHT KNEE: ICD-10-CM

## 2024-04-16 DIAGNOSIS — M17.12 UNILATERAL PRIMARY OSTEOARTHRITIS, LEFT KNEE: ICD-10-CM

## 2024-04-16 DIAGNOSIS — J45.909 ASTHMA, UNSPECIFIED ASTHMA SEVERITY, UNSPECIFIED WHETHER COMPLICATED, UNSPECIFIED WHETHER PERSISTENT (HHS-HCC): ICD-10-CM

## 2024-04-16 DIAGNOSIS — G89.29 CHRONIC PAIN OF RIGHT KNEE: ICD-10-CM

## 2024-04-16 LAB
ALBUMIN SERPL BCP-MCNC: 4.4 G/DL (ref 3.4–5)
ALP SERPL-CCNC: 76 U/L (ref 33–136)
ALT SERPL W P-5'-P-CCNC: 22 U/L (ref 7–45)
ANION GAP SERPL CALC-SCNC: 12 MMOL/L (ref 10–20)
AST SERPL W P-5'-P-CCNC: 25 U/L (ref 9–39)
BASOPHILS # BLD AUTO: 0.05 X10*3/UL (ref 0–0.1)
BASOPHILS NFR BLD AUTO: 0.7 %
BILIRUB SERPL-MCNC: 0.5 MG/DL (ref 0–1.2)
BUN SERPL-MCNC: 16 MG/DL (ref 6–23)
CALCIUM SERPL-MCNC: 9.9 MG/DL (ref 8.6–10.3)
CHLORIDE SERPL-SCNC: 106 MMOL/L (ref 98–107)
CO2 SERPL-SCNC: 26 MMOL/L (ref 21–32)
CREAT SERPL-MCNC: 0.75 MG/DL (ref 0.5–1.05)
EGFRCR SERPLBLD CKD-EPI 2021: >90 ML/MIN/1.73M*2
EOSINOPHIL # BLD AUTO: 0.19 X10*3/UL (ref 0–0.7)
EOSINOPHIL NFR BLD AUTO: 2.6 %
ERYTHROCYTE [DISTWIDTH] IN BLOOD BY AUTOMATED COUNT: 12.7 % (ref 11.5–14.5)
EST. AVERAGE GLUCOSE BLD GHB EST-MCNC: 100 MG/DL
GLUCOSE SERPL-MCNC: 102 MG/DL (ref 74–99)
HBA1C MFR BLD: 5.1 %
HCT VFR BLD AUTO: 42.3 % (ref 36–46)
HGB BLD-MCNC: 14.2 G/DL (ref 12–16)
IMM GRANULOCYTES # BLD AUTO: 0.01 X10*3/UL (ref 0–0.7)
IMM GRANULOCYTES NFR BLD AUTO: 0.1 % (ref 0–0.9)
INR PPP: 1 (ref 0.9–1.1)
LYMPHOCYTES # BLD AUTO: 2.01 X10*3/UL (ref 1.2–4.8)
LYMPHOCYTES NFR BLD AUTO: 27.1 %
MCH RBC QN AUTO: 31.1 PG (ref 26–34)
MCHC RBC AUTO-ENTMCNC: 33.6 G/DL (ref 32–36)
MCV RBC AUTO: 93 FL (ref 80–100)
MONOCYTES # BLD AUTO: 0.61 X10*3/UL (ref 0.1–1)
MONOCYTES NFR BLD AUTO: 8.2 %
NEUTROPHILS # BLD AUTO: 4.56 X10*3/UL (ref 1.2–7.7)
NEUTROPHILS NFR BLD AUTO: 61.3 %
NRBC BLD-RTO: 0 /100 WBCS (ref 0–0)
PLATELET # BLD AUTO: 363 X10*3/UL (ref 150–450)
POTASSIUM SERPL-SCNC: 3.9 MMOL/L (ref 3.5–5.3)
PROT SERPL-MCNC: 7.3 G/DL (ref 6.4–8.2)
PROTHROMBIN TIME: 11.2 SECONDS (ref 9.8–12.8)
RBC # BLD AUTO: 4.56 X10*6/UL (ref 4–5.2)
SODIUM SERPL-SCNC: 140 MMOL/L (ref 136–145)
WBC # BLD AUTO: 7.4 X10*3/UL (ref 4.4–11.3)

## 2024-04-16 PROCEDURE — 73700 CT LOWER EXTREMITY W/O DYE: CPT | Mod: LT

## 2024-04-16 PROCEDURE — 85610 PROTHROMBIN TIME: CPT

## 2024-04-16 PROCEDURE — 36415 COLL VENOUS BLD VENIPUNCTURE: CPT

## 2024-04-16 PROCEDURE — 85025 COMPLETE CBC W/AUTO DIFF WBC: CPT

## 2024-04-16 PROCEDURE — 84075 ASSAY ALKALINE PHOSPHATASE: CPT

## 2024-04-16 PROCEDURE — 83036 HEMOGLOBIN GLYCOSYLATED A1C: CPT | Mod: ELYLAB

## 2024-04-16 PROCEDURE — 87081 CULTURE SCREEN ONLY: CPT | Mod: ELYLAB

## 2024-04-16 RX ORDER — DIPHENHYDRAMINE HCL 25 MG
25 TABLET ORAL AS NEEDED
COMMUNITY

## 2024-04-16 RX ORDER — ALBUTEROL SULFATE 90 UG/1
2 AEROSOL, METERED RESPIRATORY (INHALATION) EVERY 4 HOURS PRN
Qty: 8 G | Refills: 2 | Status: SHIPPED | OUTPATIENT
Start: 2024-04-16

## 2024-04-16 RX ORDER — MOMETASONE FUROATE 50 UG/1
2 SPRAY, METERED NASAL DAILY
COMMUNITY

## 2024-04-16 RX ORDER — HYDROCODONE BITARTRATE AND ACETAMINOPHEN 7.5; 325 MG/1; MG/1
1 TABLET ORAL EVERY 6 HOURS PRN
Qty: 120 TABLET | Refills: 0 | Status: SHIPPED | OUTPATIENT
Start: 2024-04-16 | End: 2024-05-16

## 2024-04-16 ASSESSMENT — PAIN - FUNCTIONAL ASSESSMENT: PAIN_FUNCTIONAL_ASSESSMENT: 0-10

## 2024-04-16 NOTE — PREPROCEDURE INSTRUCTIONS
PATIENT GIVEN CHG SKIN WIPES/JOINT BOOK, INSTRUCTIONS REVIEWED.  PATIENT AWARE TO BRING WALKER ON ADMIT.  NO NSAIDS/ASPIRIN/SUPPLEMENTS 7-10 PRIOR TO PROCEDURE                     NPO Instructions:  Do not eat any food after midnight the night before your surgery/procedure.    Additional Instructions:

## 2024-04-16 NOTE — TELEPHONE ENCOUNTER
----- Message from Festus Menjivar sent at 4/16/2024  7:27 AM EDT -----  Regarding: Albuterol inhaler  Contact: [phone]  Can I please get a refill on this?  Thanks.

## 2024-04-18 LAB — STAPHYLOCOCCUS SPEC CULT: NORMAL

## 2024-04-22 DIAGNOSIS — E78.5 HYPERLIPIDEMIA, UNSPECIFIED HYPERLIPIDEMIA TYPE: ICD-10-CM

## 2024-04-22 RX ORDER — ATORVASTATIN CALCIUM 40 MG/1
40 TABLET, FILM COATED ORAL DAILY
Qty: 90 TABLET | Refills: 1 | Status: SHIPPED | OUTPATIENT
Start: 2024-04-22

## 2024-04-22 NOTE — TELEPHONE ENCOUNTER
----- Message from Festus Menjivar sent at 4/21/2024 12:11 PM EDT -----  Regarding: Refill generic Lipitor  Contact: [phone]  Good morning,    Can I please get a refill on this medication?     Thank you.

## 2024-04-22 NOTE — PROGRESS NOTES
"Physical Therapy    Physical Therapy Evaluation and Treatment      Patient Name: Festus Menjivar  MRN: [id number]  Today's Date: 2024    Insurance: [employer]  Allowed visits: 40    Subjective  HPI: Left JOVANY TKA 24. Patient has had knee pain for 20 years. She injured it going down steps and bowled that same night and notes \"something gave way and it's never been the same\". She has had an arthroscopic procedure in the past and has undergone cortisone injections, gel shots, and physical therapy without long-term relief. Chief complaint currently is \"just the pain, it wakes me up at night\". Biggest limitation is stair negotiation stating \"I can't bend it\". Exacerbating factors include \"if my legs are touching each other when I'm sleeping\". She has much stiffness in the AM. Relieving factors include use of pain medications and resting from WB activities. Medications include Norco and use of a muscle relaxer. PMH is positive for HLD with use of statin medication, anxiety, right ankle ORIF, , cholecystectomy, trigger finger release, right knee pain, and long term opioid use. She has a standard walker and SPC at home.   Referring physician: Darrion Murphy MD - F/U following this session.   PCP: Uvaldo Hancock MD    Living environment: one story home; no LIZZIE. Lives alone but her son will be with her for the first few days.   Work:  full time    Patient-specific goal: \"get back to normal life\".     Objective  Worst pain in the last 24 hours, 10/10    Precautions: fall risk    Gait Assessment: ambulated into clinic without an assistive device with bilateral genu varum, left worse than right. Wide JW and decreased knee flexion through swing.     AROM  Left knee flexion: slightly limited  Left knee extension: limited    MMT  Left quadriceps: 4 P!   Left iliopsoas: 4+  Left hamstrin    Assessment  59 yo female with 20 year history of present condition and presence of 9 personal factors " and/or comorbidities that impact the plan of care including chronicity of symptoms and PMH of HLD with use of statin medication, anxiety, right ankle ORIF, , cholecystectomy, trigger finger release, right knee pain, and long term opioid use presents pre-operatively with left knee pain, decreased AROM, decreasd strength, and difficulty walking effecting the following body structures and functions: left LE body region and musculoskeletal body system including the left knee. Activity limitations and participation restrictions include decreased tolerance to ambulation and stair negotiation. Festus will therefore benefit from post-op PT management to establish a HEP and promote safe healing. The clinical presentation of this patient is evolving and their history and examination findings are consistent with a moderate rcomplexity evaluation. Good potential.    Treatment provided today: Initial evaluation completed. Discussed objective findings and goals of skilled care. Provided information regarding upcoming surgical intervention including handouts outlining procedure, appropriate post-operative exercises, signs and symptoms of infection, and post-operative management of pain. Instructed patient in proper gait mechanics on level surface and stairs using WW and SPC, WBAT. Answered all questions for patient.     46817 - 15 minutes, 1 unit untimed  16990 - 15 minutes, 1 unit    Plan  Festus will be placed on hold for therapy until after completion of surgical procedure. Upon return to therapy per MD discretion, patient's status will be re-evaluated.   Plans to attend a clinic in [place] for outpatient PT if so ordered.

## 2024-04-23 ENCOUNTER — OFFICE VISIT (OUTPATIENT)
Dept: ORTHOPEDIC SURGERY | Facility: CLINIC | Age: 59
End: 2024-04-23
Payer: COMMERCIAL

## 2024-04-23 ENCOUNTER — EVALUATION (OUTPATIENT)
Dept: PHYSICAL THERAPY | Facility: CLINIC | Age: 59
End: 2024-04-23
Payer: COMMERCIAL

## 2024-04-23 ENCOUNTER — APPOINTMENT (OUTPATIENT)
Dept: PRIMARY CARE | Facility: CLINIC | Age: 59
End: 2024-04-23
Payer: COMMERCIAL

## 2024-04-23 DIAGNOSIS — M17.12 UNILATERAL PRIMARY OSTEOARTHRITIS, LEFT KNEE: Primary | ICD-10-CM

## 2024-04-23 DIAGNOSIS — M17.11 PRIMARY OSTEOARTHRITIS OF RIGHT KNEE: Primary | ICD-10-CM

## 2024-04-23 DIAGNOSIS — M17.12 PRIMARY OSTEOARTHRITIS OF LEFT KNEE: ICD-10-CM

## 2024-04-23 PROCEDURE — 97535 SELF CARE MNGMENT TRAINING: CPT | Mod: GP | Performed by: PHYSICAL THERAPIST

## 2024-04-23 PROCEDURE — 97162 PT EVAL MOD COMPLEX 30 MIN: CPT | Mod: GP | Performed by: PHYSICAL THERAPIST

## 2024-04-23 PROCEDURE — 3008F BODY MASS INDEX DOCD: CPT | Performed by: ORTHOPAEDIC SURGERY

## 2024-04-23 PROCEDURE — 2500000004 HC RX 250 GENERAL PHARMACY W/ HCPCS (ALT 636 FOR OP/ED): Performed by: ORTHOPAEDIC SURGERY

## 2024-04-23 PROCEDURE — E0143 WALKER FOLDING WHEELED W/O S: HCPCS | Performed by: ORTHOPAEDIC SURGERY

## 2024-04-23 PROCEDURE — 2500000005 HC RX 250 GENERAL PHARMACY W/O HCPCS: Performed by: ORTHOPAEDIC SURGERY

## 2024-04-23 PROCEDURE — 20610 DRAIN/INJ JOINT/BURSA W/O US: CPT | Mod: RT | Performed by: ORTHOPAEDIC SURGERY

## 2024-04-23 RX ADMIN — BETAMETHASONE ACETATE AND BETAMETHASONE SODIUM PHOSPHATE 2 ML: 3; 3 INJECTION, SUSPENSION INTRA-ARTICULAR; INTRALESIONAL; INTRAMUSCULAR; SOFT TISSUE at 15:58

## 2024-04-23 RX ADMIN — LIDOCAINE HYDROCHLORIDE 4 ML: 10 INJECTION, SOLUTION INFILTRATION; PERINEURAL at 15:58

## 2024-04-23 ASSESSMENT — PAIN SCALES - GENERAL: PAINLEVEL_OUTOF10: 5 - MODERATE PAIN

## 2024-04-23 ASSESSMENT — PATIENT HEALTH QUESTIONNAIRE - PHQ9
10. IF YOU CHECKED OFF ANY PROBLEMS, HOW DIFFICULT HAVE THESE PROBLEMS MADE IT FOR YOU TO DO YOUR WORK, TAKE CARE OF THINGS AT HOME, OR GET ALONG WITH OTHER PEOPLE: NOT DIFFICULT AT ALL
2. FEELING DOWN, DEPRESSED OR HOPELESS: SEVERAL DAYS
SUM OF ALL RESPONSES TO PHQ9 QUESTIONS 1 AND 2: 1
1. LITTLE INTEREST OR PLEASURE IN DOING THINGS: NOT AT ALL

## 2024-04-23 ASSESSMENT — ENCOUNTER SYMPTOMS
LOSS OF SENSATION IN FEET: 0
OCCASIONAL FEELINGS OF UNSTEADINESS: 1
DEPRESSION: 1

## 2024-04-23 NOTE — PROGRESS NOTES
Subjective    Patient ID: Festus    Chief Complaint:   Chief Complaint   Patient presents with    Left Knee - Pre-op Visit     LT JOVANY TKR 4/29/24 at [facility]     This patient has pain in the knee that is worsening.  The pain increases with activity and with weightbearing, and interferes with daily activities.  There is pain with range of motion, limited range of motion, crepitus and swelling.  The x-ray demonstrates joint space narrowing, osteophyte formation, and subchondral sclerosis.  The symptoms have worsened in spite of extensive medical treatment, therapy, and external support over at least the past 3 months.     This is the preop visit to discuss the risks and benefits of the total knee replacement surgery.  These risks were fully explained to the patient.  With this, and any surgery, infection is a risk, this is usually 1 to 2%, even higher in diabetics, persons with rheumatoid arthritis, previous surgeries, on oral steroid medications, and obesity.  All of these issues were properly addressed.  We always assure all sterile techniques will be followed.  Patient will also need to be on antibiotics for the next 2 years for any minor surgery, even dental work.  For high risk patients, this will be for lifetime.  Severe infections may require removal of the prosthesis.     It was also explained to the patient that there will be some minor blood loss during the procedure and a blood transfusion may be recommended if medically necessary.  It is also noted that with knee surgery a tourniquet is applied to the lower extremity to limit blood loss during the procedure.     Pulmonary embolism and blood clots are also discussed with the patient.  We discussed that these can be fatal complications to the surgery.  It is explained to the patient that the use of thromboembolic stockings, foot pumps, incentive spirometer's, early mobility, and the use of blood thinners for period of time is the standard of care for  prevention of blood clots.     Patient's preoperative range of motion is 5-110 degrees.  It is explained to the patient that this type of surgery is to decrease arthritis pain and sometimes stiffness may occur.  It is important that the patient go to physical therapy postoperatively.  It is also stated that occasionally we will have to manipulate the knee if pain and stiffness persists.     Loosening and wear of the prosthesis are also discussed.  The prosthesis normally last between 12 and 15 years.  Revisions may be more complicated and with higher risk.     Fractures, though rare, may also occur intraoperatively.  These fractures may be to the tibia or to the femur.  There may be nerve or arterial injuries as well and these are discussed in detail.     Lastly, the benefits of spinal anesthesia were explained to the patient.    Today she got a cortisone injection in her right knee to help support her left knee while it heals.  We also dispensed a wheeled walker for her today.     All of the patient's questions and concerns were answered.    For complete plan and/or surgical details, please refer to Dr. Murphy's portion of the split/shared dictation.     This note was prepared using voice recognition software.  The details of this note are correct and have been reviewed, and corrected to the best of my ability.  Some grammatical areas may persist related to the Dragon software     Carter Sainz PA-C     [phone]    Inj/Asp: Right Knee on 4/23/2024 3:58 PM  Indications: pain and diagnostic evaluation  Details: 22 G needle, anteromedial approach  Medications: 4 mL lidocaine 10 mg/mL (1 %); 2 mL betamethasone acet,sod phos 6 mg/mL  Outcome: tolerated well, no immediate complications  Procedure, treatment alternatives, risks and benefits explained, specific risks discussed. Consent was given by the patient. Immediately prior to procedure a time out was called to verify the correct patient, procedure, equipment,  support staff and site/side marked as required. Patient was prepped and draped in the usual sterile fashion.         In a face-to-face encounter performed today, I Darrion Murphy MD performed a history and physical examination, discussed pertinent diagnostic studies if indicated, and discussed diagnosis and management strategies with both the patient and the midlevel provider.  I reviewed the midlevel's note and agree with the documented findings and plan of care.  Greater than 50% of the evaluation and treatment decision was performed by the physician myself during today's visit.    Patient here for preop visit for her left total knee replacement surgeries on the 29th but she is having persistent pain in her right knee we had discussed this previously she is having popping clicking grinding and discomfort in the right knee on examination she has limited range of motion tenderness to medial joint space without instability.  Right knee cortisone injection performed today without complication see dictated procedure note.  Patient understands the cortisone may provide temporary relief how much it helps her how long it lasts is unpredictable.  Cortisone can be repeated after 3 months if symptoms return.      Patient has severe impairment related to her presenting condition.  It is significantly impairing bodily function.  We did discuss surgical and nonsurgical options.    This note was prepared using voice recognition software.  The details of this note are correct and have been reviewed, and corrected to the best of my ability.  Some grammatical areas may persist related to the Dragon software    Darrion Murphy MD  Senior Attending Physician  [facility]    [phone]

## 2024-04-24 RX ORDER — LIDOCAINE HYDROCHLORIDE 10 MG/ML
4 INJECTION INFILTRATION; PERINEURAL
Status: COMPLETED | OUTPATIENT
Start: 2024-04-23 | End: 2024-04-23

## 2024-04-24 RX ORDER — BETAMETHASONE SODIUM PHOSPHATE AND BETAMETHASONE ACETATE 3; 3 MG/ML; MG/ML
2 INJECTION, SUSPENSION INTRA-ARTICULAR; INTRALESIONAL; INTRAMUSCULAR; SOFT TISSUE
Status: COMPLETED | OUTPATIENT
Start: 2024-04-23 | End: 2024-04-23

## 2024-04-26 ENCOUNTER — ANESTHESIA EVENT (OUTPATIENT)
Dept: OPERATING ROOM | Facility: HOSPITAL | Age: 59
End: 2024-04-26
Payer: COMMERCIAL

## 2024-04-26 PROCEDURE — RXMED WILLOW AMBULATORY MEDICATION CHARGE

## 2024-04-27 SDOH — HEALTH STABILITY: MENTAL HEALTH: CURRENT SMOKER: 0

## 2024-04-27 NOTE — ANESTHESIA PREPROCEDURE EVALUATION
Festus Menjivar is a 58 y.o. female here for:    Arthroplasty Total Knee Robot-Assisted, patient is needing same day discharge for 04/29 per benjie kern, NERVE BLOCK, JAZMÍN MANZO  With Darrion Murphy MD  Pre-Op Diagnosis Codes:     * Unilateral primary osteoarthritis, left knee [M17.12]    Relevant Problems   Cardiac   (+) Hyperlipidemia      Pulmonary   (+) Asthma (HHS-HCC)      Neuro   (+) Depression   (+) Generalized anxiety disorder      Endocrine   (+) Class 1 obesity due to excess calories without serious comorbidity with body mass index (BMI) of 33.0 to 33.9 in adult      Musculoskeletal   (+) Unilateral primary osteoarthritis, left knee      HEENT   (+) Seasonal allergies       Lab Results   Component Value Date    HGB 14.2 04/16/2024    HCT 42.3 04/16/2024    WBC 7.4 04/16/2024     04/16/2024     04/16/2024    K 3.9 04/16/2024     04/16/2024    CREATININE 0.75 04/16/2024    BUN 16 04/16/2024       Social History     Tobacco Use   Smoking Status Never   Smokeless Tobacco Never       Allergies   Allergen Reactions    Amoxicillin Rash       Current Outpatient Medications   Medication Instructions    albuterol (Ventolin HFA) 90 mcg/actuation inhaler 2 puffs, inhalation, Every 4 hours PRN    atorvastatin (LIPITOR) 40 mg, oral, Daily    cranberry fruit concentrate (AZO CRANBERRY ORAL) 2 capsules, oral, Daily    cyclobenzaprine (FLEXERIL) 10 mg, oral, 3 times daily PRN    diazePAM (VALIUM) 5 mg, oral, 2 times daily PRN    diphenhydrAMINE (SOMINEX) 25 mg, oral, As needed    fluticasone propion-salmeteroL (Advair Diskus) 250-50 mcg/dose diskus inhaler 1 puff, inhalation, 2 times daily RT    HYDROcodone-acetaminophen (Norco) 7.5-325 mg tablet 1 tablet, oral, Every 6 hours PRN    ibuprofen/diphenhydramine cit (ADVIL PM ORAL) 2 tablets, oral, As needed    Lactobacillus acidophilus (PROBIOTIC ACIDOPHILUS ORAL) 1 capsule, oral, Daily    melatonin 10 mg tablet,chewable 2 tablets, oral, Nightly  PRN    mometasone (Nasonex) 50 mcg/actuation nasal spray 2 sprays, Each Nostril, Daily    multivitamin tablet 1 tablet, oral, Daily RT    naloxone (Narcan) 4 mg/0.1 mL nasal spray Instill 1 spray intranasally for opioid overdose; repeat in 5 minures if no response.    naloxone (NARCAN) 4 mg, nasal, As needed    NON FORMULARY oral, Daily, COLLAGEN POWDER    NON FORMULARY 2 each, oral, Daily, OMEGA XL FISH OIL       Past Surgical History:   Procedure Laterality Date    ANKLE FRACTURE SURGERY      OTHER SURGICAL HISTORY Bilateral 2019    Knee surgery    OTHER SURGICAL HISTORY  2019     section    OTHER SURGICAL HISTORY Right 2019    Ankle surgery    OTHER SURGICAL HISTORY  2019    Gallbladder surgery    OTHER SURGICAL HISTORY Bilateral 2022    Trigger finger repair X 4    TRIGGER FINGER RELEASE         Family History   Problem Relation Name Age of Onset    Cancer Sister Alina Gonzales        NPO Details:  No data recorded    Physical Exam    Airway  Mallampati: II  TM distance: >3 FB  Neck ROM: full     Cardiovascular - normal exam     Dental - normal exam     Pulmonary - normal exam     Abdominal            Anesthesia Plan    History of general anesthesia?: yes  History of complications of general anesthesia?: no    ASA 2     regional, MAC and spinal     The patient is not a current smoker.    intravenous induction   Postoperative administration of opioids is intended.  Anesthetic plan and risks discussed with patient.    Plan discussed with CRNA.

## 2024-04-29 ENCOUNTER — DOCUMENTATION (OUTPATIENT)
Dept: HOME HEALTH SERVICES | Facility: HOME HEALTH | Age: 59
End: 2024-04-29

## 2024-04-29 ENCOUNTER — PHARMACY VISIT (OUTPATIENT)
Dept: PHARMACY | Facility: CLINIC | Age: 59
End: 2024-04-29
Payer: COMMERCIAL

## 2024-04-29 ENCOUNTER — HOSPITAL ENCOUNTER (OUTPATIENT)
Facility: HOSPITAL | Age: 59
Discharge: HOME | End: 2024-04-29
Attending: ORTHOPAEDIC SURGERY | Admitting: ORTHOPAEDIC SURGERY
Payer: COMMERCIAL

## 2024-04-29 ENCOUNTER — HOME HEALTH ADMISSION (OUTPATIENT)
Dept: HOME HEALTH SERVICES | Facility: HOME HEALTH | Age: 59
End: 2024-04-29
Payer: COMMERCIAL

## 2024-04-29 ENCOUNTER — APPOINTMENT (OUTPATIENT)
Dept: RADIOLOGY | Facility: HOSPITAL | Age: 59
End: 2024-04-29
Payer: COMMERCIAL

## 2024-04-29 ENCOUNTER — ANESTHESIA (OUTPATIENT)
Dept: OPERATING ROOM | Facility: HOSPITAL | Age: 59
End: 2024-04-29
Payer: COMMERCIAL

## 2024-04-29 VITALS
HEART RATE: 89 BPM | OXYGEN SATURATION: 96 % | RESPIRATION RATE: 20 BRPM | WEIGHT: 169.09 LBS | BODY MASS INDEX: 33.2 KG/M2 | DIASTOLIC BLOOD PRESSURE: 70 MMHG | TEMPERATURE: 97 F | SYSTOLIC BLOOD PRESSURE: 115 MMHG | HEIGHT: 60 IN

## 2024-04-29 DIAGNOSIS — Z96.652 S/P TOTAL KNEE ARTHROPLASTY, LEFT: ICD-10-CM

## 2024-04-29 DIAGNOSIS — M17.12 UNILATERAL PRIMARY OSTEOARTHRITIS, LEFT KNEE: Primary | ICD-10-CM

## 2024-04-29 PROCEDURE — C1776 JOINT DEVICE (IMPLANTABLE): HCPCS | Performed by: ORTHOPAEDIC SURGERY

## 2024-04-29 PROCEDURE — 73560 X-RAY EXAM OF KNEE 1 OR 2: CPT | Mod: LEFT SIDE | Performed by: RADIOLOGY

## 2024-04-29 PROCEDURE — 3600000018 HC OR TIME - INITIAL BASE CHARGE - PROCEDURE LEVEL SIX: Performed by: ORTHOPAEDIC SURGERY

## 2024-04-29 PROCEDURE — 2720000007 HC OR 272 NO HCPCS: Performed by: ORTHOPAEDIC SURGERY

## 2024-04-29 PROCEDURE — 97116 GAIT TRAINING THERAPY: CPT | Mod: GP

## 2024-04-29 PROCEDURE — 2500000005 HC RX 250 GENERAL PHARMACY W/O HCPCS: Performed by: ORTHOPAEDIC SURGERY

## 2024-04-29 PROCEDURE — G0378 HOSPITAL OBSERVATION PER HR: HCPCS

## 2024-04-29 PROCEDURE — 2500000004 HC RX 250 GENERAL PHARMACY W/ HCPCS (ALT 636 FOR OP/ED): Mod: JZ | Performed by: ORTHOPAEDIC SURGERY

## 2024-04-29 PROCEDURE — 73560 X-RAY EXAM OF KNEE 1 OR 2: CPT | Mod: LT

## 2024-04-29 PROCEDURE — 2780000003 HC OR 278 NO HCPCS: Performed by: ORTHOPAEDIC SURGERY

## 2024-04-29 PROCEDURE — 7100000001 HC RECOVERY ROOM TIME - INITIAL BASE CHARGE: Performed by: ORTHOPAEDIC SURGERY

## 2024-04-29 PROCEDURE — 2500000004 HC RX 250 GENERAL PHARMACY W/ HCPCS (ALT 636 FOR OP/ED): Performed by: STUDENT IN AN ORGANIZED HEALTH CARE EDUCATION/TRAINING PROGRAM

## 2024-04-29 PROCEDURE — 97165 OT EVAL LOW COMPLEX 30 MIN: CPT | Mod: GO

## 2024-04-29 PROCEDURE — 3700000001 HC GENERAL ANESTHESIA TIME - INITIAL BASE CHARGE: Performed by: ORTHOPAEDIC SURGERY

## 2024-04-29 PROCEDURE — 2500000004 HC RX 250 GENERAL PHARMACY W/ HCPCS (ALT 636 FOR OP/ED): Performed by: ORTHOPAEDIC SURGERY

## 2024-04-29 PROCEDURE — 97110 THERAPEUTIC EXERCISES: CPT | Mod: GP

## 2024-04-29 PROCEDURE — 3600000017 HC OR TIME - EACH INCREMENTAL 1 MINUTE - PROCEDURE LEVEL SIX: Performed by: ORTHOPAEDIC SURGERY

## 2024-04-29 PROCEDURE — 7100000002 HC RECOVERY ROOM TIME - EACH INCREMENTAL 1 MINUTE: Performed by: ORTHOPAEDIC SURGERY

## 2024-04-29 PROCEDURE — 3700000002 HC GENERAL ANESTHESIA TIME - EACH INCREMENTAL 1 MINUTE: Performed by: ORTHOPAEDIC SURGERY

## 2024-04-29 PROCEDURE — 97535 SELF CARE MNGMENT TRAINING: CPT | Mod: GO

## 2024-04-29 PROCEDURE — 2500000004 HC RX 250 GENERAL PHARMACY W/ HCPCS (ALT 636 FOR OP/ED): Performed by: NURSE ANESTHETIST, CERTIFIED REGISTERED

## 2024-04-29 PROCEDURE — 27447 TOTAL KNEE ARTHROPLASTY: CPT | Performed by: PHYSICIAN ASSISTANT

## 2024-04-29 PROCEDURE — 2500000005 HC RX 250 GENERAL PHARMACY W/O HCPCS: Performed by: NURSE ANESTHETIST, CERTIFIED REGISTERED

## 2024-04-29 PROCEDURE — 2500000006 HC RX 250 W HCPCS SELF ADMINISTERED DRUGS (ALT 637 FOR ALL PAYERS): Performed by: ORTHOPAEDIC SURGERY

## 2024-04-29 PROCEDURE — A4217 STERILE WATER/SALINE, 500 ML: HCPCS | Performed by: ORTHOPAEDIC SURGERY

## 2024-04-29 PROCEDURE — 7100000011 HC EXTENDED STAY RECOVERY HOURLY - NURSING UNIT

## 2024-04-29 PROCEDURE — 97161 PT EVAL LOW COMPLEX 20 MIN: CPT | Mod: GP

## 2024-04-29 PROCEDURE — RXMED WILLOW AMBULATORY MEDICATION CHARGE

## 2024-04-29 PROCEDURE — 27447 TOTAL KNEE ARTHROPLASTY: CPT | Performed by: ORTHOPAEDIC SURGERY

## 2024-04-29 PROCEDURE — 2500000001 HC RX 250 WO HCPCS SELF ADMINISTERED DRUGS (ALT 637 FOR MEDICARE OP): Performed by: ORTHOPAEDIC SURGERY

## 2024-04-29 PROCEDURE — C1713 ANCHOR/SCREW BN/BN,TIS/BN: HCPCS | Performed by: ORTHOPAEDIC SURGERY

## 2024-04-29 DEVICE — CRUCIATE RETAINING FEMORAL
Type: IMPLANTABLE DEVICE | Site: KNEE | Status: FUNCTIONAL
Brand: TRIATHLON

## 2024-04-29 DEVICE — TIBIAL BEARING INSERT - CS
Type: IMPLANTABLE DEVICE | Site: KNEE | Status: FUNCTIONAL
Brand: TRIATHLON

## 2024-04-29 DEVICE — PATELLA
Type: IMPLANTABLE DEVICE | Site: KNEE | Status: FUNCTIONAL
Brand: TRIATHLON

## 2024-04-29 DEVICE — TIBIAL COMPONENT
Type: IMPLANTABLE DEVICE | Site: KNEE | Status: FUNCTIONAL
Brand: TRIATHLON

## 2024-04-29 RX ORDER — CELECOXIB 200 MG/1
200 CAPSULE ORAL ONCE
Status: COMPLETED | OUTPATIENT
Start: 2024-04-29 | End: 2024-04-29

## 2024-04-29 RX ORDER — NALOXONE HYDROCHLORIDE 1 MG/ML
0.2 INJECTION INTRAMUSCULAR; INTRAVENOUS; SUBCUTANEOUS EVERY 5 MIN PRN
Status: DISCONTINUED | OUTPATIENT
Start: 2024-04-29 | End: 2024-04-29 | Stop reason: HOSPADM

## 2024-04-29 RX ORDER — OXYCODONE HYDROCHLORIDE 5 MG/1
5 TABLET ORAL EVERY 4 HOURS PRN
Status: DISCONTINUED | OUTPATIENT
Start: 2024-04-29 | End: 2024-04-29

## 2024-04-29 RX ORDER — LABETALOL HYDROCHLORIDE 5 MG/ML
5 INJECTION, SOLUTION INTRAVENOUS ONCE AS NEEDED
Status: DISCONTINUED | OUTPATIENT
Start: 2024-04-29 | End: 2024-04-29 | Stop reason: HOSPADM

## 2024-04-29 RX ORDER — CEFAZOLIN SODIUM 2 G/100ML
2 INJECTION, SOLUTION INTRAVENOUS EVERY 8 HOURS
Qty: 100 ML | Refills: 0 | Status: COMPLETED | OUTPATIENT
Start: 2024-04-29 | End: 2024-04-29

## 2024-04-29 RX ORDER — GABAPENTIN 300 MG/1
600 CAPSULE ORAL ONCE
Status: COMPLETED | OUTPATIENT
Start: 2024-04-29 | End: 2024-04-29

## 2024-04-29 RX ORDER — CYCLOBENZAPRINE HCL 10 MG
10 TABLET ORAL 3 TIMES DAILY PRN
Qty: 21 TABLET | Refills: 0 | Status: SHIPPED | OUTPATIENT
Start: 2024-04-29 | End: 2024-05-13 | Stop reason: SDUPTHER

## 2024-04-29 RX ORDER — SODIUM CHLORIDE, SODIUM LACTATE, POTASSIUM CHLORIDE, CALCIUM CHLORIDE 600; 310; 30; 20 MG/100ML; MG/100ML; MG/100ML; MG/100ML
100 INJECTION, SOLUTION INTRAVENOUS CONTINUOUS
Status: DISCONTINUED | OUTPATIENT
Start: 2024-04-29 | End: 2024-04-29 | Stop reason: HOSPADM

## 2024-04-29 RX ORDER — ONDANSETRON HYDROCHLORIDE 2 MG/ML
INJECTION, SOLUTION INTRAVENOUS AS NEEDED
Status: DISCONTINUED | OUTPATIENT
Start: 2024-04-29 | End: 2024-04-29

## 2024-04-29 RX ORDER — OXYCODONE HYDROCHLORIDE 5 MG/1
10 TABLET ORAL EVERY 4 HOURS PRN
Status: DISCONTINUED | OUTPATIENT
Start: 2024-04-29 | End: 2024-04-29 | Stop reason: HOSPADM

## 2024-04-29 RX ORDER — OXYCODONE HYDROCHLORIDE 5 MG/1
10 TABLET ORAL EVERY 4 HOURS PRN
Status: DISCONTINUED | OUTPATIENT
Start: 2024-04-29 | End: 2024-04-29

## 2024-04-29 RX ORDER — ONDANSETRON 4 MG/1
4 TABLET, ORALLY DISINTEGRATING ORAL EVERY 8 HOURS PRN
Status: DISCONTINUED | OUTPATIENT
Start: 2024-04-29 | End: 2024-04-29 | Stop reason: HOSPADM

## 2024-04-29 RX ORDER — SODIUM CHLORIDE, SODIUM LACTATE, POTASSIUM CHLORIDE, CALCIUM CHLORIDE 600; 310; 30; 20 MG/100ML; MG/100ML; MG/100ML; MG/100ML
50 INJECTION, SOLUTION INTRAVENOUS CONTINUOUS
Status: DISCONTINUED | OUTPATIENT
Start: 2024-04-29 | End: 2024-04-29 | Stop reason: HOSPADM

## 2024-04-29 RX ORDER — ONDANSETRON HYDROCHLORIDE 2 MG/ML
4 INJECTION, SOLUTION INTRAVENOUS EVERY 8 HOURS PRN
Status: DISCONTINUED | OUTPATIENT
Start: 2024-04-29 | End: 2024-04-29 | Stop reason: HOSPADM

## 2024-04-29 RX ORDER — SODIUM CHLORIDE 0.9 G/100ML
IRRIGANT IRRIGATION AS NEEDED
Status: DISCONTINUED | OUTPATIENT
Start: 2024-04-29 | End: 2024-04-29 | Stop reason: HOSPADM

## 2024-04-29 RX ORDER — ASPIRIN 81 MG/1
81 TABLET ORAL 2 TIMES DAILY
Status: DISCONTINUED | OUTPATIENT
Start: 2024-04-30 | End: 2024-04-29 | Stop reason: HOSPADM

## 2024-04-29 RX ORDER — ASPIRIN 81 MG/1
81 TABLET ORAL 2 TIMES DAILY
Qty: 60 TABLET | Refills: 0 | Status: SHIPPED | OUTPATIENT
Start: 2024-04-30 | End: 2024-05-30

## 2024-04-29 RX ORDER — PROPOFOL 10 MG/ML
INJECTION, EMULSION INTRAVENOUS AS NEEDED
Status: DISCONTINUED | OUTPATIENT
Start: 2024-04-29 | End: 2024-04-29

## 2024-04-29 RX ORDER — CYCLOBENZAPRINE HCL 10 MG
10 TABLET ORAL 3 TIMES DAILY PRN
Status: DISCONTINUED | OUTPATIENT
Start: 2024-04-29 | End: 2024-04-29 | Stop reason: HOSPADM

## 2024-04-29 RX ORDER — CEFAZOLIN SODIUM 2 G/100ML
2 INJECTION, SOLUTION INTRAVENOUS ONCE
Status: COMPLETED | OUTPATIENT
Start: 2024-04-29 | End: 2024-04-29

## 2024-04-29 RX ORDER — ROPIVACAINE HYDROCHLORIDE 2 MG/ML
20 INJECTION, SOLUTION EPIDURAL; INFILTRATION; PERINEURAL ONCE
Status: COMPLETED | OUTPATIENT
Start: 2024-04-29 | End: 2024-04-29

## 2024-04-29 RX ORDER — MORPHINE SULFATE 2 MG/ML
2 INJECTION, SOLUTION INTRAMUSCULAR; INTRAVENOUS EVERY 2 HOUR PRN
Status: DISCONTINUED | OUTPATIENT
Start: 2024-04-29 | End: 2024-04-29 | Stop reason: HOSPADM

## 2024-04-29 RX ORDER — TRANEXAMIC ACID 650 MG/1
1950 TABLET ORAL ONCE
Status: COMPLETED | OUTPATIENT
Start: 2024-04-29 | End: 2024-04-29

## 2024-04-29 RX ORDER — FENTANYL CITRATE 50 UG/ML
25 INJECTION, SOLUTION INTRAMUSCULAR; INTRAVENOUS EVERY 5 MIN PRN
Status: DISCONTINUED | OUTPATIENT
Start: 2024-04-29 | End: 2024-04-29 | Stop reason: HOSPADM

## 2024-04-29 RX ORDER — DOCUSATE SODIUM 100 MG/1
100 CAPSULE, LIQUID FILLED ORAL 2 TIMES DAILY
Qty: 20 CAPSULE | Refills: 0 | Status: SHIPPED | OUTPATIENT
Start: 2024-04-29 | End: 2024-05-09

## 2024-04-29 RX ORDER — OXYCODONE HYDROCHLORIDE 5 MG/1
5 TABLET ORAL EVERY 4 HOURS PRN
Status: DISCONTINUED | OUTPATIENT
Start: 2024-04-29 | End: 2024-04-29 | Stop reason: HOSPADM

## 2024-04-29 RX ORDER — PROPOFOL 10 MG/ML
INJECTION, EMULSION INTRAVENOUS CONTINUOUS PRN
Status: DISCONTINUED | OUTPATIENT
Start: 2024-04-29 | End: 2024-04-29

## 2024-04-29 RX ORDER — KETOROLAC TROMETHAMINE 30 MG/ML
30 INJECTION, SOLUTION INTRAMUSCULAR; INTRAVENOUS EVERY 6 HOURS
Status: DISCONTINUED | OUTPATIENT
Start: 2024-04-29 | End: 2024-04-29 | Stop reason: HOSPADM

## 2024-04-29 RX ORDER — DIPHENHYDRAMINE HYDROCHLORIDE 50 MG/ML
12.5 INJECTION INTRAMUSCULAR; INTRAVENOUS ONCE AS NEEDED
Status: DISCONTINUED | OUTPATIENT
Start: 2024-04-29 | End: 2024-04-29 | Stop reason: HOSPADM

## 2024-04-29 RX ORDER — BUPIVACAINE HYDROCHLORIDE 7.5 MG/ML
INJECTION, SOLUTION INTRASPINAL AS NEEDED
Status: DISCONTINUED | OUTPATIENT
Start: 2024-04-29 | End: 2024-04-29

## 2024-04-29 RX ORDER — OXYCODONE HYDROCHLORIDE 5 MG/1
5 TABLET ORAL EVERY 6 HOURS PRN
Qty: 28 TABLET | Refills: 0 | Status: SHIPPED | OUTPATIENT
Start: 2024-04-29 | End: 2024-05-03 | Stop reason: SDUPTHER

## 2024-04-29 RX ORDER — MIDAZOLAM HYDROCHLORIDE 1 MG/ML
INJECTION, SOLUTION INTRAMUSCULAR; INTRAVENOUS AS NEEDED
Status: DISCONTINUED | OUTPATIENT
Start: 2024-04-29 | End: 2024-04-29

## 2024-04-29 RX ORDER — ONDANSETRON HYDROCHLORIDE 2 MG/ML
4 INJECTION, SOLUTION INTRAVENOUS ONCE AS NEEDED
Status: DISCONTINUED | OUTPATIENT
Start: 2024-04-29 | End: 2024-04-29 | Stop reason: HOSPADM

## 2024-04-29 RX ORDER — ACETAMINOPHEN 325 MG/1
650 TABLET ORAL EVERY 6 HOURS SCHEDULED
Status: DISCONTINUED | OUTPATIENT
Start: 2024-04-29 | End: 2024-04-29 | Stop reason: HOSPADM

## 2024-04-29 RX ORDER — DOCUSATE SODIUM 100 MG/1
100 CAPSULE, LIQUID FILLED ORAL 2 TIMES DAILY
Status: DISCONTINUED | OUTPATIENT
Start: 2024-04-29 | End: 2024-04-29 | Stop reason: HOSPADM

## 2024-04-29 RX ORDER — ROPIVACAINE/EPI/CLONIDINE/KET 2.46-0.005
SYRINGE (ML) INJECTION AS NEEDED
Status: DISCONTINUED | OUTPATIENT
Start: 2024-04-29 | End: 2024-04-29 | Stop reason: HOSPADM

## 2024-04-29 RX ORDER — WATER 1 ML/ML
IRRIGANT IRRIGATION AS NEEDED
Status: DISCONTINUED | OUTPATIENT
Start: 2024-04-29 | End: 2024-04-29 | Stop reason: HOSPADM

## 2024-04-29 RX ORDER — MEPERIDINE HYDROCHLORIDE 25 MG/ML
12.5 INJECTION INTRAMUSCULAR; INTRAVENOUS; SUBCUTANEOUS EVERY 10 MIN PRN
Status: DISCONTINUED | OUTPATIENT
Start: 2024-04-29 | End: 2024-04-29 | Stop reason: HOSPADM

## 2024-04-29 RX ORDER — GLYCOPYRROLATE 0.2 MG/ML
INJECTION INTRAMUSCULAR; INTRAVENOUS AS NEEDED
Status: DISCONTINUED | OUTPATIENT
Start: 2024-04-29 | End: 2024-04-29

## 2024-04-29 RX ORDER — BISACODYL 5 MG
10 TABLET, DELAYED RELEASE (ENTERIC COATED) ORAL DAILY PRN
Status: DISCONTINUED | OUTPATIENT
Start: 2024-04-29 | End: 2024-04-29 | Stop reason: HOSPADM

## 2024-04-29 RX ORDER — DIPHENHYDRAMINE HCL 25 MG
25 TABLET ORAL EVERY 6 HOURS PRN
Status: DISCONTINUED | OUTPATIENT
Start: 2024-04-29 | End: 2024-04-29 | Stop reason: HOSPADM

## 2024-04-29 RX ORDER — HYDRALAZINE HYDROCHLORIDE 20 MG/ML
5 INJECTION INTRAMUSCULAR; INTRAVENOUS EVERY 30 MIN PRN
Status: DISCONTINUED | OUTPATIENT
Start: 2024-04-29 | End: 2024-04-29 | Stop reason: HOSPADM

## 2024-04-29 RX ORDER — ACETAMINOPHEN 325 MG/1
975 TABLET ORAL ONCE
Status: COMPLETED | OUTPATIENT
Start: 2024-04-29 | End: 2024-04-29

## 2024-04-29 RX ORDER — ALBUTEROL SULFATE 0.83 MG/ML
2.5 SOLUTION RESPIRATORY (INHALATION) ONCE AS NEEDED
Status: DISCONTINUED | OUTPATIENT
Start: 2024-04-29 | End: 2024-04-29 | Stop reason: HOSPADM

## 2024-04-29 RX ORDER — ACETAMINOPHEN 325 MG/1
650 TABLET ORAL EVERY 6 HOURS SCHEDULED
Qty: 56 TABLET | Refills: 0 | Status: SHIPPED | OUTPATIENT
Start: 2024-04-29 | End: 2024-05-06

## 2024-04-29 RX ADMIN — TRANEXAMIC ACID 1950 MG: 650 TABLET ORAL at 15:07

## 2024-04-29 RX ADMIN — ROPIVACAINE HYDROCHLORIDE: 5 INJECTION EPIDURAL; INFILTRATION; PERINEURAL at 07:24

## 2024-04-29 RX ADMIN — KETOROLAC TROMETHAMINE 30 MG: 30 INJECTION, SOLUTION INTRAMUSCULAR at 15:07

## 2024-04-29 RX ADMIN — OXYCODONE HYDROCHLORIDE 5 MG: 5 TABLET ORAL at 12:41

## 2024-04-29 RX ADMIN — SODIUM CHLORIDE, SODIUM LACTATE, POTASSIUM CHLORIDE, AND CALCIUM CHLORIDE: .6; .31; .03; .02 INJECTION, SOLUTION INTRAVENOUS at 07:36

## 2024-04-29 RX ADMIN — KETOROLAC TROMETHAMINE 30 MG: 30 INJECTION, SOLUTION INTRAMUSCULAR at 10:19

## 2024-04-29 RX ADMIN — ROPIVACAINE HYDROCHLORIDE 40 MG: 2 INJECTION, SOLUTION EPIDURAL; INFILTRATION at 07:24

## 2024-04-29 RX ADMIN — SODIUM CHLORIDE, POTASSIUM CHLORIDE, SODIUM LACTATE AND CALCIUM CHLORIDE 50 ML/HR: 600; 310; 30; 20 INJECTION, SOLUTION INTRAVENOUS at 06:29

## 2024-04-29 RX ADMIN — ACETAMINOPHEN 975 MG: 325 TABLET ORAL at 06:27

## 2024-04-29 RX ADMIN — SODIUM CHLORIDE, POTASSIUM CHLORIDE, SODIUM LACTATE AND CALCIUM CHLORIDE 50 ML/HR: 600; 310; 30; 20 INJECTION, SOLUTION INTRAVENOUS at 10:18

## 2024-04-29 RX ADMIN — TRANEXAMIC ACID 1950 MG: 650 TABLET ORAL at 06:24

## 2024-04-29 RX ADMIN — CEFAZOLIN SODIUM 2 G: 2 INJECTION, SOLUTION INTRAVENOUS at 15:07

## 2024-04-29 RX ADMIN — CEFAZOLIN SODIUM 2 G: 2 INJECTION, SOLUTION INTRAVENOUS at 07:36

## 2024-04-29 RX ADMIN — PROPOFOL 100 MCG/KG/MIN: 10 INJECTION, EMULSION INTRAVENOUS at 07:46

## 2024-04-29 RX ADMIN — ACETAMINOPHEN 650 MG: 325 TABLET ORAL at 12:41

## 2024-04-29 RX ADMIN — GABAPENTIN 600 MG: 300 CAPSULE ORAL at 06:23

## 2024-04-29 RX ADMIN — BUPIVACAINE HYDROCHLORIDE IN DEXTROSE 1.6 ML: 7.5 INJECTION, SOLUTION SUBARACHNOID at 07:41

## 2024-04-29 RX ADMIN — CELECOXIB 200 MG: 200 CAPSULE ORAL at 06:24

## 2024-04-29 RX ADMIN — POVIDONE-IODINE 1 APPLICATION: 5 SOLUTION TOPICAL at 06:28

## 2024-04-29 RX ADMIN — MIDAZOLAM 2 MG: 1 INJECTION INTRAMUSCULAR; INTRAVENOUS at 07:25

## 2024-04-29 RX ADMIN — GLYCOPYRROLATE 0.2 MG: 0.2 INJECTION, SOLUTION INTRAMUSCULAR; INTRAVENOUS at 07:44

## 2024-04-29 RX ADMIN — ONDANSETRON 4 MG: 2 INJECTION, SOLUTION INTRAMUSCULAR; INTRAVENOUS at 07:44

## 2024-04-29 RX ADMIN — PROPOFOL 100 MG: 10 INJECTION, EMULSION INTRAVENOUS at 08:07

## 2024-04-29 RX ADMIN — OXYCODONE HYDROCHLORIDE 10 MG: 5 TABLET ORAL at 16:47

## 2024-04-29 RX ADMIN — PROPOFOL 100 MG: 10 INJECTION, EMULSION INTRAVENOUS at 07:46

## 2024-04-29 SDOH — SOCIAL STABILITY: SOCIAL INSECURITY: HAVE YOU HAD THOUGHTS OF HARMING ANYONE ELSE?: NO

## 2024-04-29 SDOH — SOCIAL STABILITY: SOCIAL INSECURITY: ARE YOU OR HAVE YOU BEEN THREATENED OR ABUSED PHYSICALLY, EMOTIONALLY, OR SEXUALLY BY ANYONE?: NO

## 2024-04-29 SDOH — SOCIAL STABILITY: SOCIAL INSECURITY: ABUSE: ADULT

## 2024-04-29 SDOH — SOCIAL STABILITY: SOCIAL INSECURITY: DOES ANYONE TRY TO KEEP YOU FROM HAVING/CONTACTING OTHER FRIENDS OR DOING THINGS OUTSIDE YOUR HOME?: NO

## 2024-04-29 SDOH — SOCIAL STABILITY: SOCIAL INSECURITY: HAVE YOU HAD ANY THOUGHTS OF HARMING ANYONE ELSE?: NO

## 2024-04-29 SDOH — SOCIAL STABILITY: SOCIAL INSECURITY: HAS ANYONE EVER THREATENED TO HURT YOUR FAMILY OR YOUR PETS?: NO

## 2024-04-29 SDOH — SOCIAL STABILITY: SOCIAL INSECURITY: DO YOU FEEL ANYONE HAS EXPLOITED OR TAKEN ADVANTAGE OF YOU FINANCIALLY OR OF YOUR PERSONAL PROPERTY?: NO

## 2024-04-29 SDOH — SOCIAL STABILITY: SOCIAL INSECURITY: DO YOU FEEL UNSAFE GOING BACK TO THE PLACE WHERE YOU ARE LIVING?: NO

## 2024-04-29 SDOH — SOCIAL STABILITY: SOCIAL INSECURITY: WERE YOU ABLE TO COMPLETE ALL THE BEHAVIORAL HEALTH SCREENINGS?: YES

## 2024-04-29 SDOH — SOCIAL STABILITY: SOCIAL INSECURITY: ARE THERE ANY APPARENT SIGNS OF INJURIES/BEHAVIORS THAT COULD BE RELATED TO ABUSE/NEGLECT?: NO

## 2024-04-29 ASSESSMENT — LIFESTYLE VARIABLES
SUBSTANCE_ABUSE_PAST_12_MONTHS: NO
HOW MANY STANDARD DRINKS CONTAINING ALCOHOL DO YOU HAVE ON A TYPICAL DAY: 3 OR 4
HOW OFTEN DO YOU HAVE 6 OR MORE DRINKS ON ONE OCCASION: NEVER
HOW OFTEN DO YOU HAVE A DRINK CONTAINING ALCOHOL: MONTHLY OR LESS
PRESCIPTION_ABUSE_PAST_12_MONTHS: NO
AUDIT-C TOTAL SCORE: 2
SKIP TO QUESTIONS 9-10: 0
AUDIT-C TOTAL SCORE: 2

## 2024-04-29 ASSESSMENT — PAIN SCALES - GENERAL
PAINLEVEL_OUTOF10: 0 - NO PAIN
PAINLEVEL_OUTOF10: 7
PAIN_LEVEL: 0
PAINLEVEL_OUTOF10: 0 - NO PAIN
PAINLEVEL_OUTOF10: 2
PAINLEVEL_OUTOF10: 3
PAINLEVEL_OUTOF10: 5 - MODERATE PAIN
PAINLEVEL_OUTOF10: 6
PAINLEVEL_OUTOF10: 0 - NO PAIN
PAINLEVEL_OUTOF10: 5 - MODERATE PAIN

## 2024-04-29 ASSESSMENT — COGNITIVE AND FUNCTIONAL STATUS - GENERAL
DRESSING REGULAR LOWER BODY CLOTHING: A LITTLE
CLIMB 3 TO 5 STEPS WITH RAILING: TOTAL
MOBILITY SCORE: 16
TURNING FROM BACK TO SIDE WHILE IN FLAT BAD: A LITTLE
STANDING UP FROM CHAIR USING ARMS: A LITTLE
MOBILITY SCORE: 16
MOVING FROM LYING ON BACK TO SITTING ON SIDE OF FLAT BED WITH BEDRAILS: A LITTLE
WALKING IN HOSPITAL ROOM: A LOT
PERSONAL GROOMING: A LITTLE
DAILY ACTIVITIY SCORE: 21
TOILETING: A LITTLE
MOVING TO AND FROM BED TO CHAIR: A LITTLE
TURNING FROM BACK TO SIDE WHILE IN FLAT BAD: A LITTLE
CLIMB 3 TO 5 STEPS WITH RAILING: A LOT
HELP NEEDED FOR BATHING: A LITTLE
WALKING IN HOSPITAL ROOM: A LITTLE
PATIENT BASELINE BEDBOUND: NO
DAILY ACTIVITIY SCORE: 19
DRESSING REGULAR UPPER BODY CLOTHING: A LITTLE
MOVING TO AND FROM BED TO CHAIR: A LITTLE
DRESSING REGULAR UPPER BODY CLOTHING: A LITTLE
MOVING FROM LYING ON BACK TO SITTING ON SIDE OF FLAT BED WITH BEDRAILS: A LITTLE
TOILETING: A LITTLE
DRESSING REGULAR LOWER BODY CLOTHING: A LITTLE
STANDING UP FROM CHAIR USING ARMS: A LITTLE

## 2024-04-29 ASSESSMENT — ACTIVITIES OF DAILY LIVING (ADL)
LACK_OF_TRANSPORTATION: NO
DRESSING YOURSELF: INDEPENDENT
ADEQUATE_TO_COMPLETE_ADL: NO
BATHING_ASSISTANCE: MINIMAL
HEARING - LEFT EAR: FUNCTIONAL
GROOMING: INDEPENDENT
FEEDING YOURSELF: INDEPENDENT
LACK_OF_TRANSPORTATION: PATIENT DECLINED
HEARING - RIGHT EAR: FUNCTIONAL
PATIENT'S MEMORY ADEQUATE TO SAFELY COMPLETE DAILY ACTIVITIES?: NO
HOME_MANAGEMENT_TIME_ENTRY: 10
ASSISTIVE_DEVICE: WALKER;SHOWER CHAIR;EYEGLASSES
WALKS IN HOME: INDEPENDENT
JUDGMENT_ADEQUATE_SAFELY_COMPLETE_DAILY_ACTIVITIES: NO
TOILETING: INDEPENDENT
BATHING: INDEPENDENT

## 2024-04-29 ASSESSMENT — PAIN - FUNCTIONAL ASSESSMENT
PAIN_FUNCTIONAL_ASSESSMENT: 0-10

## 2024-04-29 ASSESSMENT — COLUMBIA-SUICIDE SEVERITY RATING SCALE - C-SSRS
1. IN THE PAST MONTH, HAVE YOU WISHED YOU WERE DEAD OR WISHED YOU COULD GO TO SLEEP AND NOT WAKE UP?: NO
6. HAVE YOU EVER DONE ANYTHING, STARTED TO DO ANYTHING, OR PREPARED TO DO ANYTHING TO END YOUR LIFE?: NO
2. HAVE YOU ACTUALLY HAD ANY THOUGHTS OF KILLING YOURSELF?: NO

## 2024-04-29 ASSESSMENT — PATIENT HEALTH QUESTIONNAIRE - PHQ9
1. LITTLE INTEREST OR PLEASURE IN DOING THINGS: NOT AT ALL
2. FEELING DOWN, DEPRESSED OR HOPELESS: SEVERAL DAYS
SUM OF ALL RESPONSES TO PHQ9 QUESTIONS 1 & 2: 1

## 2024-04-29 ASSESSMENT — PAIN DESCRIPTION - DESCRIPTORS: DESCRIPTORS: TIGHTNESS

## 2024-04-29 NOTE — DISCHARGE SUMMARY
Discharge summary  This patient Festus Menjivar was admitted to the hospital on 4/29/2024  after undergoing Procedure(s) (LRB):  Arthroplasty Total Knee Robot-Assisted (Left) without complications that morning.    During the postoperative period,while in hospital, patient was medically managed by the hospitalist. Please see medial notes and H&P for patients additional diagnoses.  Ortho agrees with all medical diagnoses and treatments while patient in hospital.  No significant or unexpected findings or abnormal ortho imaging were noted during the hospital stay    Hospital course      Patient tolerated surgical procedure well and there was no complications. Patient progressed adequately through their recovery during hospital stay including PT and rehabilitation.    Patient was then D/C on  to home  in stable condition.  Patient was instructed on the use of pain medications, the signs and symptoms of infection, and was given our number to call should they have any questions or concerns following discharge.    PDMP reviewed showing patient is prescribed Norco 7.5-325 mg tablets for 120 tablets every 30 days for chronic pain management. Will send patient home with Oxycodone 5 mg q6hrs for acute post operative pain. Patient has been educated not to take the Norco and Oxycodone at the same time. Patient will be given narcan and has been educated on proper use.      Based on my clinical judgment, the patient was provided with a 7-day prescription for opioid medication at 30 MED, indicated for treatment of acute pain in the setting of recent surgery. OARRS report was run and has demonstrated an appropriate time course.  The patient has been provided with counseling pertaining to safe use of opioid medication.      Patient may WBAT to operative extremity with use of walker for assistance with ambulation   Mepilex dressing to be removed POD#7 and incision left open to air  Aspirin 81 mg BID for DVT prophylaxis started on  4/29/24 and to be taken for 30 days  Follow up with surgeon in 2 weeks

## 2024-04-29 NOTE — ANESTHESIA PROCEDURE NOTES
Spinal Block    Patient location during procedure: OR  Start time: 4/29/2024 7:36 AM  End time: 4/29/2024 7:41 AM  Reason for block: primary anesthetic and at surgeon's request  Staffing  Performed: ARNULFO   Authorized by: Stew Wong DO    Performed by: EFREM Everett    Preanesthetic Checklist  Completed: patient identified, IV checked, site marked, risks and benefits discussed, surgical consent, monitors and equipment checked, pre-op evaluation, timeout performed and sterile techniques followed  Block Timeout  RN/Licensed healthcare professional reads aloud to the Anesthesia provider and entire team: Patient identity, procedure with side and site, patient position, and as applicable the availability of implants/special equipment/special requirements.  Patient on coagulant treatment: no  Timeout performed at: 4/29/2024 7:36 AM  Spinal Block  Patient position: sitting  Prep: Betadine  Sterility prep: cap, drape, gloves, hand hygiene and mask  Sedation level: light sedation  Patient monitoring: blood pressure, continuous pulse oximetry, EKG, ETCO2 and heart rate  Approach: left paramedian  Vertebral space: L4-5  Injection technique: single-shot  Needle  Needle type: pencil-point   Needle gauge: 24 G  Free flowing CSF: yes    Assessment  Sensory level: T6 bilateral  Block outcome: patient comfortable  Procedure assessment: patient tolerated procedure well with no immediate complications

## 2024-04-29 NOTE — PROGRESS NOTES
Orthopedic Surgery Progress Note   TKA    Subjective:     Post-Operative Day # 0  Status Post left Total Knee Arthroplasty    Systemic or Specific Complaints: No Complaints    Objective:     Visit Vitals  /70 (BP Location: Left arm, Patient Position: Lying)   Pulse 89   Temp 36.1 °C (97 °F) (Temporal)   Resp 20       General: alert and oriented, in no acute distress, appears stated age, and cooperative   Wound: no erythema, no edema, no drainage, and dressing is clean, dry, and intact   Motion: Painful range of Motion   DVT Exam: No evidence of DVT seen on physical exam.  Negative Cedrick's sign.  No significant calf/ankle edema.       Knee swollen but thigh soft to palpation. Moving foot and ankle. Good distal pulses.      Data Review  No results found for this or any previous visit (from the past 24 hour(s)).      Assessment:     Status Post left Total Knee Arthroplasty. Doing well postoperatively.     Acute postoperative pain: Reports mild to moderate pain to operative extremity. Exacerbated by movement, relieved by rest ice and pain medication.  Continue current pain management.     Plan:      1.  Discharge today, Return to Clinic: in 2 weeks    2.  Continue Deep venous thrombosis prophylaxis: Aspirin 81 mg BID for 30 days  3.  Continue physical therapy: Weight-bearing as tolerated with use of walker for assistance with ambulation   4.  Continue Pain Control: scripts sent  5.  Discharge planning: patient plans to return to home with  home care at discharge, orders placed. SHARI and [facility] following for discharge planning.       BAILEY Delgado   4/29/2024 1:05 PM

## 2024-04-29 NOTE — NURSING NOTE
"Pt c/o lower abd pressure. External checked and it is draining small amount of clear yellow. Bladder scan done with 1240 ml noted.   Will check orders and straight cath per orders and pt permission.     12: 40 Pt straight cath per orders and hospital policy for 1000 ml clear yellow.  Pt states \"I feel so much better\". Pt cleaned up and external cath placed along with clean sheets and warm blankets. Family sent back in room .   "

## 2024-04-29 NOTE — PROGRESS NOTES
04/29/24 1311   Discharge Planning   Living Arrangements Alone   Support Systems Children;Family members   Assistance Needed none, PTA pt states IND ADLS and IADLS no AD, drives, works, denies falls. Pt owns FWW, cane, walk-in shower with built in seat   Type of Residence Private residence  (1 level home)   Number of Stairs to Enter Residence 0   Number of Stairs Within Residence 0   Do you have animals or pets at home? No   Type of Home Care Services Home OT;Home PT   Patient expects to be discharged to: Home with [facility]   Does the patient need discharge transport arranged? No   Financial Resource Strain   How hard is it for you to pay for the very basics like food, housing, medical care, and heating? Not hard   Housing Stability   In the last 12 months, was there a time when you were not able to pay the mortgage or rent on time? N   In the last 12 months, how many places have you lived? 1   In the last 12 months, was there a time when you did not have a steady place to sleep or slept in a shelter (including now)? N   Transportation Needs   In the past 12 months, has lack of transportation kept you from medical appointments or from getting medications? no   In the past 12 months, has lack of transportation kept you from meetings, work, or from getting things needed for daily living? No   Patient Choice   Provider Choice list and CMS website (https://medicare.gov/care-compare#search) for post-acute Quality and Resource Measure Data were provided and reviewed with: Patient   Patient / Family choosing to utilize agency / facility established prior to hospitalization No     Pt is s/p Elective Left total knee arthroplasty 4/29/24 with Dr. Darrion Murphy. PT/OT al [facility] scores are currently pending. Pt states DC preference is home with [facility] and agency of choice is [facility], states she anticipates DC today. CNP confirms pt is same day DC, and is aware of pt [facility] agency of choice. Demographics verified, pt states best  contact number is [phone]. Pt son to stay with her 2-3 days to assist as needed, available longer if needed.  HHC  notified of HHC referral/HCO in Epic, and confirms received and is sent for insurance check, awaiting confirmation of SOC.

## 2024-04-29 NOTE — PROGRESS NOTES
Occupational Therapy    Evaluation    Patient Name: Festus Menjivar  MRN: [id number]  Today's Date: 4/29/2024  Time Calculation  Start Time: 1350  Stop Time: 1430  Time Calculation (min): 40 min    Assessment  IP OT Assessment  End of Session Communication: Bedside nurse  End of Session Patient Position:  (Ambulating with PT when OT left)  Plan:  Treatment Interventions: ADL retraining, Functional transfer training, Patient/family training, Equipment evaluation/education, Compensatory technique education  OT Frequency: 2 times per week  OT Discharge Recommendations: Low intensity level of continued care  OT - OK to Discharge: Yes (when medically appropriate)    Subjective   Current Problem:  1. Unilateral primary osteoarthritis, left knee        2. S/P total knee arthroplasty, left  acetaminophen (Tylenol) 325 mg tablet    aspirin 81 mg EC tablet    docusate sodium (Colace) 100 mg capsule    oxyCODONE (Roxicodone) 5 mg immediate release tablet    cyclobenzaprine (Flexeril) 10 mg tablet    Referral to Home Health        General:  General  Reason for Referral: TKA  Referred By: Katherine  Past Medical History Relevant to Rehab: Includes: Asthma, OA, depression, anxiety, cholecystecomy, DVT, obesity, right ankle  surgery, R and L knee ACL surgeries, 3 trigger finger surgeries.  Co-Treatment: PT  Prior to Session Communication: Bedside nurse  Patient Position Received: Bed, 3 rail up, Alarm on  General Comment: Elective L TKA by Dr. Murphy on 4/29/24. Post op L knee XR: Immediate postsurgical changes of left total knee arthroplasty  without hardware complication.  Precautions:  LE Weight Bearing Status: Weight Bearing as Tolerated  Medical Precautions: Fall precautions  Precautions Comment: Leave knee immobilizer in place until pt. can SLR or no concern for buckling       Pain:  Pain Assessment  Pain Assessment: 0-10  Pain Score: 5 - Moderate pain  Pain Type: Acute pain, Surgical pain  Pain Location:  (L  knee)    Objective   Cognition:  Overall Cognitive Status: Within Functional Limits  Orientation Level: Oriented X4           Home Living:  Home Living Comments: Lives alone.  Son plans to stay with her for a few days after surgery.  No steps to enter.  1st floor set up.  Owns FWW and cane.  No AD use prior to admission.  Walk in shower with 2 seats, hand held shower head, no safety bars in shower.  Independent with mobility, ADLs and IADLs.  Drives.  Works as a .  No falls.        ADL:  Eating Assistance: Independent  Grooming Deficit: Verbal cueing, Supervision/safety  Bathing Assistance: Minimal  UE Dressing Deficit: Setup  LE Dressing Assistance: Minimal (Cues for dressing surgical leg first and for safe technique. Min assist with L sock 2nd to bulky ace wrap to LLE.)  Toileting Deficit: Verbal cueing, Supervison/safety (Safety cues for hand placements, walker placements, steadying while managing pants and hygiene in standing.)  Functional Deficit: Verbal cueing, Supervision/safety  Activity Tolerance:     Bed Mobility/Transfers: Bed Mobility  Bed Mobility: Yes  Bed Mobility 1  Bed Mobility 1: Supine to sitting  Level of Assistance 1:  (SBA)    Transfers  Transfer:  (Sit to stand at EOB, sit<>stand at toilet, sit<>stand at recliner, all with cues for safe hand placements, safe proximity to seats, LE placements for balance. Discussed safe technique for home shower transfers.)      Functional Mobility:  Functional Mobility  Functional Mobility Performed: Yes (Ambulated between tasks in room with FWW.  Cues for not allowing feet past front bar of walker and for stepping/no pivoting for all turns, cues for pushing walker rather than picking it up.  Cues for turning walker and body to task (at sink).)  Sitting Balance:  Static Sitting Balance  Static Sitting-Comment/Number of Minutes: Normal  Dynamic Sitting Balance  Dynamic Sitting-Comments: Normal  Standing Balance:  Static Standing Balance  Static  Standing-Comment/Number of Minutes: Good (-)  Dynamic Standing Balance  Dynamic Standing-Comments: Good (-)/fair (+)    Extremities: RUE   RUE : Within Functional Limits and LUE   LUE: Within Functional Limits    Outcome Measures: [facility] Daily Activity  Putting on and taking off regular lower body clothing: A little  Bathing (including washing, rinsing, drying): A little  Putting on and taking off regular upper body clothing: A little  Toileting, which includes using toilet, bedpan or urinal: A little  Taking care of personal grooming such as brushing teeth: A little  Eating Meals: None  Daily Activity - Total Score: 19      Education Documentation  Precautions, taught by Marti Ruiz OT at 4/29/2024  2:58 PM.  Learner: Patient  Readiness: Acceptance  Method: Explanation  Response: Verbalizes Understanding    ADL Training, taught by Marti Ruiz OT at 4/29/2024  2:58 PM.  Learner: Patient  Readiness: Acceptance  Method: Explanation  Response: Verbalizes Understanding    Education Comments  No comments found.      Goals:   Encounter Problems       Encounter Problems (Active)       OT Goals       Mod I ADL functional mobility with FWW.        Start:  04/29/24    Expected End:  05/13/24            Mod I sit/stand, bed/chair/commode/shower transfers with FWW.       Start:  04/29/24    Expected End:  05/13/24            Mod I LB dressing.        Start:  04/29/24    Expected End:  05/13/24            Mod I LB bathing.        Start:  04/29/24    Expected End:  05/13/24            Mod I toileting.        Start:  04/29/24    Expected End:  05/13/24

## 2024-04-29 NOTE — CARE PLAN
Problem: Pain  Goal: Free from opioid side effects throughout the shift  Outcome: Progressing     Problem: Fall/Injury  Goal: Not fall by end of shift  Outcome: Progressing  Goal: Use assistive devices by end of the shift  Outcome: Progressing

## 2024-04-29 NOTE — NURSING NOTE
Discharge instructions given. Pt verbalizes understanding. No questions or concerns at this time.

## 2024-04-29 NOTE — OP NOTE
Arthroplasty Total Knee Robot-Assisted (L) Operative Note     Date: 2024  OR Location: [facility]    Name: Festus Menjivar, : 1965, Age: 58 y.o., MRN: [id number], Sex: female    Diagnosis  Pre-op Diagnosis     * Unilateral primary osteoarthritis, left knee [M17.12] Post-op Diagnosis     * Unilateral primary osteoarthritis, left knee [M17.12]     Procedures  Arthroplasty Total Knee Robot-Assisted  [id number] - ID ARTHRP KNE CONDYLE&PLATU MEDIAL&LAT COMPARTMENTS      Surgeons      * Darrion Murphy - Primary    Resident/Fellow/Other Assistant:  Surgeons and Role:     * Carter Sainz PA-C - Assisting     * Amada Trimble PA-C - Assisting    Procedure Summary  Anesthesia: Spinal  ASA: II  Anesthesia Staff: Anesthesiologist: Stew Wong DO  CRNA: VALERIO Everett-CRNA  Estimated Blood Loss: minmL  Intra-op Medications:   Administrations occurring from 0745 to 0845 on 24:   Medication Name Total Dose   ropivacaine-epinephrine-clonidine-ketorolac 2.46-0.005- 0.0008-0.3mg/mL periarticular syringe 100 mL   sodium chloride 0.9 % irrigation solution 1,000 mL   sterile water irrigation solution 1,000 mL              Anesthesia Record               Intraprocedure I/O Totals          Intake    Propofol Drip 0.00 mL    The total shown is the total volume documented since Anesthesia Start was filed.    Total Intake 0 mL          Specimen: No specimens collected     Staff:   Circulator: Hipolito Fuentes RN  Scrub Person: Kia Hernandez         Drains and/or Catheters: * None in log *    Tourniquet Times:   * Missing tourniquet times found for documented tourniquets in lo *     Implants:  Implants       Type Name Action Serial No.      Joint COMPONENT, CR FEMORAL, P1, BEADED W/PA, LEFT - [id number] Implanted      Joint BASEPLATE, TRIATHLON TRITANIUM, SIZE 2 - [id number] Implanted      Joint INSERT, TIBIAL, X3 TRIATHLON CS, SZ-2 9MM - [id number] Implanted      Joint PATELLA, TRIATHLON, ASYMMETRIC,  SIZE A29 - [id number] Implanted               Findings: see procedure details    Indications: Festus Menjivar is an 58 y.o. female who is having surgery for Unilateral primary osteoarthritis, left knee [M17.12].     The patient was seen in the preoperative area. The risks, benefits, complications, treatment options, non-operative alternatives, expected recovery and outcomes were discussed with the patient. The possibilities of reaction to medication, pulmonary aspiration, injury to surrounding structures, bleeding, recurrent infection, the need for additional procedures, failure to diagnose a condition, and creating a complication requiring transfusion or operation were discussed with the patient. The patient concurred with the proposed plan, giving informed consent.  The site of surgery was properly noted/marked if necessary per policy. The patient has been actively warmed in preoperative area. Preoperative antibiotics have been ordered and given within 1 hours of incision. Venous thrombosis prophylaxis have been ordered.    Procedure Details:   Preop diagnosis is DJD left knee  Postoperative diagnosis is same  Procedure total knee replacement using the [employer] computer assisted robotic-assisted knee replacement system    Anesthesia spinal with nerve block  EBL minimal    Implants [place] uncemented  Tibial component size2  Femoral component size1 CR  Patella size29  Insert size9 CS    Primary surgeon Darrion Murphy  Assisting surgeon [Carter Sainz] PA certified      Pre-operative alignment 1 degree flexion contracture 18.5 degrees varus  Post-operative alignment 1 degree hyperextension 6 degrees varus      This patient has progressive knee pain which has been refractory to conservative treatment.  The patient has decided to undergo elective total knee replacement.  The risks, benefits, outcomes and postoperative course were fully explained to the patient.  We discussed wear, loosening, blood clot infection, nerve  damage, numbness and tingling, failure of the procedure, stiffness, loss of life, loss of limb, and the need for possible revision surgery.  The patient understands the procedure and consents to surgical intervention.    The patient has pain in the knee that is increased with activity and weightbearing, and walks with an antalgic gait.  The pain is interfering with activities of daily living.  The patient has limited range of motion and pain with passive range of motion.  X-rays demonstrate joint space narrowing, subchondral sclerosis and osteophyte formation.  Symptoms are not improving with medication, therapy or supportive device for a period of at least 3 months.    The physician assistant was present through the entire case.  Given the nature of the disease process and the procedure to be performed skilled surgical first assistant was necessary during the case.  The assistant was necessary to hold retractors and manipulate the extremity during the procedure.  A certified scrub tech was at the back table managing the instruments and supplies for the surgical case.    Patient was taken to the operating room placed on the table in the supine position where upon adequate anesthesia was obtained  A tourniquet was applied to the lower extremity  The patient was then prepped and draped in the usual sterile manner the leg was then exsanguinated using an Esmarch bandage and the tourniquet was inflated to 250 mmHg  A surgical timeout was performed  A linear midline incision was made through the skin and subcutaneous tissues using sharp and blunt dissection.  A medial parapatellar arthrotomy was then performed and the patella was everted.  The patellar fat pad was then excised.  The femoral array and femoral checkpoint were then inserted in standard technique.  the tibial array was placed in the tibial checkpoints was applied.  Hip center was then identified by rotating the lower extremity.  Medial and lateral malleolar  checkpoints performed.  At this point femoral mapping was performed using the blue probe and accuracy was confirmed and verified.  Tibial mapping was then performed with the patella was well and accuracy was confirmed.  Pose capture was then performed both extension and flexion.  Flexion and extension gaps were assessed and adjusted appropriately.  Once we confirmed flexion and extension gaps the cut sequence was verified and confirmed.  The robot was then introduced into the field the sawblade was verified self-retaining retractors were placed and the femoral cuts were then performed.  Tibial cut was completed as well and all bony surfaces were removed without difficulty meniscal remnants were excised  Tibial trial was then placed on the tibial bone surface to assess for appropriate sizing.  Tibial component rotation was confirmed using the green probe.   The femoral trial was applied as was the polyethylene insert.  The knee was placed in flexion and extension,  stability was assessed throughout using the robotic system as well as manual tension.  Patellar reaming was then performed and peg holes were drilled for the patella and the patella trial was applied.  Patellofemoral tracking was assessed and once we confirmed stability and patellofemoral tracking the tibial component was pinned in position.  The remaining trials were removed and the tibial bone surface was completed using the cruciate punch all bony surfaces were then irrigated with a copious amount of pulsatile irrigation.    Components were then inserted and completely seated without difficulty    The knee joint was then irrigated with a copious pulsatile irrigation.  Joint capsule was repaired using interrupted #1 Vicryl suture.  3-0 Monocryl suture was used for the underlying subcutaneous tissue and 4-0 Monocryl for the skin.  A dry sterile bulky dressing was applied checkpoints and array were removed prior to closure  Patient was taken to the  postanesthesia care unit in good condition postoperative x-rays were reviewed and findings the procedure were discussed with the patient's family    This note was prepared using voice recognition software.  The details of this note are correct and have been reviewed, and corrected to the best of my ability.  Some grammatical areas may persist related to the Dragon software    Darrion Murphy MD    [phone]      Complications:  None; patient tolerated the procedure well.    Disposition: PACU - hemodynamically stable.  Condition: stable         Darrion Murphy  Phone Number: [phone]

## 2024-04-29 NOTE — TREATMENT PLAN
Patient states she does not use a CPAP or Bipap machine at home and no need for one while in the hospital, patient instructed and encouraged to use IS as ordered

## 2024-04-29 NOTE — PROGRESS NOTES
Physical Therapy    Physical Therapy Evaluation    Patient Name: Festus Menjivar  MRN: [id number]  Today's Date: 4/29/2024   Time Calculation  Start Time: 1351  Stop Time: 1455  Time Calculation (min): 64 min    Assessment/Plan   PT Assessment  PT Assessment Results: Decreased strength, Decreased range of motion, Decreased endurance, Impaired balance, Decreased mobility, Decreased coordination, Pain  Rehab Prognosis: Good  Evaluation/Treatment Tolerance: Patient limited by fatigue, Patient limited by pain  End of Session Communication: Bedside nurse  Assessment Comment: pt with decreased mobility/gait strength balance, endurance and ROM . pt to benefit from skilled PT to address deficits and improve functional mobility .  End of Session Patient Position: Up in chair, Alarm on  IP OR SWING BED PT PLAN  Inpatient or Swing Bed: Inpatient  PT Plan  Treatment/Interventions: Bed mobility, Transfer training, Gait training, Stair training, Balance training, Strengthening, Endurance training, Range of motion, Therapeutic exercise, Therapeutic activity, Home exercise program  PT Plan: Skilled PT  PT Frequency: BID  PT Discharge Recommendations: Low intensity level of continued care  PT Recommended Transfer Status: Assist x1, Assistive device  PT - OK to Discharge: Yes    Subjective     Current Problem:  Patient Active Problem List   Diagnosis    Asthma (HHS-HCC)    Chronic pain of left knee    Depression    Generalized anxiety disorder    Hyperlipidemia    Seasonal allergies    Sleep difficulties    Trigger ring finger of left hand    Preop examination    BMI 33.0-33.9,adult    Class 1 obesity due to excess calories without serious comorbidity with body mass index (BMI) of 33.0 to 33.9 in adult    S/P total knee arthroplasty, left       General Visit Information:  General  Reason for Referral: S/P L TKA  Referred By: Katherine 4/29 OT/PT  Past Medical History Relevant to Rehab: Includes: Asthma, OA, depression, anxiety,  cholecystecomy, DVT, obesity, right ankle  surgery, R and L knee ACL surgeries, 3 trigger finger surgeries.  Prior to Session Communication: Bedside nurse  Patient Position Received: Bed, 3 rail up, Alarm on (immobilizer L knee IV)  General Comment: Elective L TKA by Dr. Murphy on 4/29/24. Post op L knee XR: Immediate postsurgical changes of left total knee arthroplasty  without hardware complication. Eval and treat plan for same day discharge.    Home Living:/ PLOF   Home Living  Home Living Comments: Lives alone. Son plans to stay with her for a few days after surgery. No steps to enter. 1st floor set up. Owns FWW and cane. No AD use prior to admission. Walk in shower with 2 seats, hand held shower head, no safety bars in shower. Independent with mobility, ADLs and IADLs. Drives. Works as a . No falls.    Precautions:  Precautions  LE Weight Bearing Status: Weight Bearing as Tolerated  Medical Precautions: Fall precautions  Precautions Comment: Leave knee immobilizer in place until pt. can SLR or no concern for buckling    Objective     Pain:  Pain Assessment  Pain Assessment: 0-10  Pain Score: 5 - Moderate pain  Pain Type: Surgical pain  Pain Location: Knee  Pain Orientation: Left    Cognition:  Cognition  Overall Cognitive Status: Within Functional Limits  Orientation Level: Oriented X4    General Assessments:     Dynamic Sitting Balance  Dynamic Sitting-Comments: good-  Dynamic Standing Balance  Dynamic Standing-Comments: fair +    Functional Assessments:     Bed Mobility  Bed Mobility: Yes  Bed Mobility 1  Bed Mobility 1: Supine to sitting, Scooting  Level of Assistance 1: Close supervision  Bed Mobility Comments 1: SBA to EOB  Transfers  Transfer: Yes  Transfer 1  Technique 1: Sit to stand, Stand to sit  Transfer Device 1: Walker  Transfer Level of Assistance 1: Close supervision  Trials/Comments 1: cues to push up and reach back with transfers . immob on at start of session then removed  later in session.  Ambulation/Gait Training  Ambulation/Gait Training Performed: Yes  Ambulation/Gait Training 1  Surface 1: Level tile  Device 1: Rolling walker  Quality of Gait 1: Decreased step length, Inconsistent stride length  Comments/Distance (ft) 1: 150 ft x2 with L Knee immbilizer SUP cues to keep walker out in front. 150ft x 2 with FWW with immobilizer removed with SBA . cues for heel toe gait pattern.     Extremity/Trunk Assessments:    RLE   RLE : Within Functional Limits (MMT 5/5)  LLE   LLE : Exceptions to WFL  AROM LLE (degrees)  L Knee Flexion 0-130: 90  L Knee Extension 0-130: 0  Strength LLE  L Hip Flexion: 3+/5  L Hip Extension: 3+/5  L Hip ABduction: 3+/5  L Hip ADduction: 3+/5  L Knee Flexion: 3+/5  L Knee Extension: 3+/5  L Ankle Dorsiflexion: 4/5  L Ankle Plantar Flexion: 4/5    Outcome Measures:  [facility] Basic Mobility  Turning from your back to your side while in a flat bed without using bedrails: A little  Moving from lying on your back to sitting on the side of a flat bed without using bedrails: A little  Moving to and from bed to chair (including a wheelchair): A little  Standing up from a chair using your arms (e.g. wheelchair or bedside chair): A little  To walk in hospital room: A little  Climbing 3-5 steps with railing: Total  Basic Mobility - Total Score: 16       Treatment : supine thera ex 2x10 BLEs  : ankle pumps , Quad sets , SAQs , SLRs, heel slides glut sets  seated thera ex 2x10 : LAQs, marching , heel slides. Ankle pumps.    Goals:  Encounter Problems       Encounter Problems (Active)       PT Problem       Pt will demonstrate mod I  with bed mobility to edge of bed.         Start:  04/29/24    Expected End:  05/06/24            Pt will demonstrate mod I  with sit to stand/chair transfers with FWW.         Start:  04/29/24    Expected End:  05/06/24            Pt will ambulate 200 feet with FWW mod I .         Start:  04/29/24    Expected End:  05/06/24            Pt to demo  0-100 ROM of L Knee         Start:  04/29/24    Expected End:  05/06/24            Patient will demonstrate independence in home program for support of progression       Start:  04/29/24    Expected End:  05/06/24                 Education Documentation  Home Exercise Program, taught by Bry Berman PT at 4/29/2024  3:51 PM.  Learner: Patient  Readiness: Acceptance  Method: Explanation  Response: Verbalizes Understanding    Mobility Training, taught by Bry Berman PT at 4/29/2024  3:51 PM.  Learner: Patient  Readiness: Acceptance  Method: Explanation  Response: Verbalizes Understanding    Education Comments  No comments found.

## 2024-04-29 NOTE — ANESTHESIA PROCEDURE NOTES
Peripheral Block    Patient location during procedure: pre-op  Start time: 4/29/2024 7:25 AM  End time: 4/29/2024 7:30 AM  Reason for block: at surgeon's request and post-op pain management  Staffing  Performed: attending   Authorized by: Stew Wong DO    Performed by: Stew Wong DO  Preanesthetic Checklist  Completed: patient identified, IV checked, site marked, risks and benefits discussed, surgical consent, monitors and equipment checked, pre-op evaluation and timeout performed   Timeout performed at: 4/29/2024 7:25 AM  Peripheral Block  Patient position: laying flat  Prep: ChloraPrep  Patient monitoring: heart rate, cardiac monitor and continuous pulse ox  Block type: adductor canal (+IPACK)  Laterality: left  Injection technique: single-shot  Guidance: ultrasound guided  Local infiltration: lidocaine  Infiltration strength: 1 %  Dose: 5 mL  Needle  Needle gauge: 22 G  Needle length: 10 cm  Needle localization: anatomical landmarks and ultrasound guidance  Assessment  Injection assessment: negative aspiration for heme, no paresthesia on injection, incremental injection and local visualized surrounding nerve on ultrasound  Paresthesia pain: none  Heart rate change: no  Slow fractionated injection: yes  Additional Notes  Adductor canal and IPACK nerve blocks performed for post-operative analgesia. 2 mg IV versed given for procedural sedation. Sterile prep. 20 mL of 0.5% ropivacaine with decadron 5 mg given in divided doses for the adductor canal. 20 mL of 0.2% ropivacaine given for the IPACK block. Negative aspiration for heme every 5 mL injected. No pain, paresthesias. Patient tolerated the procedure well without complications.

## 2024-04-29 NOTE — HH CARE COORDINATION
Home Care received a Referral for Physical Therapy and Occupational Therapy. We have processed the referral for a Start of Care on 4/30/24.     If you have any questions or concerns, please feel free to contact us at [phone]. Follow the prompts, enter your five digit zip code, and you will be directed to your care team on [facility].

## 2024-04-29 NOTE — ANESTHESIA POSTPROCEDURE EVALUATION
Patient: Festus Menjivar    Procedure Summary       Date: 04/29/24 Room / Location: [facility] OR 12 / [facility]    Anesthesia Start: 0736 Anesthesia Stop: 0857    Procedure: Arthroplasty Total Knee Robot-Assisted (Left: Knee) Diagnosis:       Unilateral primary osteoarthritis, left knee      (Unilateral primary osteoarthritis, left knee [M17.12])    Surgeons: Darrion Murphy MD Responsible Provider: Stew Wong DO    Anesthesia Type: regional, MAC, spinal ASA Status: 2            Anesthesia Type: regional, MAC, spinal    Vitals Value Taken Time   /61 04/29/24 0857   Temp 36.1 04/29/24 0857   Pulse 103 04/29/24 0857   Resp 15 04/29/24 0857   SpO2 96 04/29/24 0857       Anesthesia Post Evaluation    Patient location during evaluation: bedside  Patient participation: complete - patient participated  Level of consciousness: awake and alert  Pain score: 0  Pain management: adequate  Airway patency: patent  Cardiovascular status: acceptable and stable  Respiratory status: acceptable and room air  Hydration status: acceptable  Postoperative Nausea and Vomiting: none        No notable events documented.

## 2024-04-29 NOTE — INTERVAL H&P NOTE
History and physical is reviewed, patient re evaluated, no changes.  Procedure, risks, benefits, and postoperative course were discussed with the patient and consent is reviewed.    Darrion Murphy MD

## 2024-04-30 ENCOUNTER — TELEPHONE (OUTPATIENT)
Dept: PRIMARY CARE | Facility: CLINIC | Age: 59
End: 2024-04-30
Payer: COMMERCIAL

## 2024-04-30 ENCOUNTER — HOME CARE VISIT (OUTPATIENT)
Dept: HOME HEALTH SERVICES | Facility: HOME HEALTH | Age: 59
End: 2024-04-30
Payer: COMMERCIAL

## 2024-04-30 VITALS
HEART RATE: 94 BPM | RESPIRATION RATE: 20 BRPM | DIASTOLIC BLOOD PRESSURE: 80 MMHG | WEIGHT: 169 LBS | SYSTOLIC BLOOD PRESSURE: 140 MMHG | BODY MASS INDEX: 33.18 KG/M2 | HEIGHT: 60 IN

## 2024-04-30 PROCEDURE — G0151 HHCP-SERV OF PT,EA 15 MIN: HCPCS

## 2024-04-30 PROCEDURE — 0023 HH SOC

## 2024-04-30 SDOH — HEALTH STABILITY: PHYSICAL HEALTH: PHYSICAL EXERCISE: SUPINE

## 2024-04-30 SDOH — HEALTH STABILITY: PHYSICAL HEALTH: PHYSICAL EXERCISE: 3

## 2024-04-30 SDOH — HEALTH STABILITY: PHYSICAL HEALTH: EXERCISE ACTIVITY: ANKLE PUMPS, QUAD AND GLUT SETS, SAQ, AAROM HEEL SLIDES

## 2024-04-30 SDOH — HEALTH STABILITY: PHYSICAL HEALTH: EXERCISE ACTIVITIES SETS: 1

## 2024-04-30 SDOH — HEALTH STABILITY: PHYSICAL HEALTH: EXERCISE TYPE: LTKR PROTOCOL

## 2024-04-30 ASSESSMENT — ACTIVITIES OF DAILY LIVING (ADL)
AMBULATION ASSISTANCE: 1
OASIS_M1830: 05
AMBULATION ASSISTANCE ON FLAT SURFACES: 1
AMBULATION ASSISTANCE: ONE PERSON
CURRENT_FUNCTION: ONE PERSON
PHYSICAL TRANSFERS ASSESSED: 1
ENTERING_EXITING_HOME: MINIMUM ASSIST
AMBULATION_DISTANCE/DURATION_TOLERATED: 20 FEET X2

## 2024-04-30 ASSESSMENT — ENCOUNTER SYMPTOMS
PAIN: 1
SUBJECTIVE PAIN PROGRESSION: GRADUALLY WORSENING
LOWEST PAIN SEVERITY IN PAST 24 HOURS: 10/10
OCCASIONAL FEELINGS OF UNSTEADINESS: 1
HIGHEST PAIN SEVERITY IN PAST 24 HOURS: 10/10
PAIN LOCATION: LEFT KNEE
PAIN SEVERITY GOAL: 0/10
PERSON REPORTING PAIN: PATIENT

## 2024-04-30 NOTE — TELEPHONE ENCOUNTER
----- Message from Festus Menjivar sent at 4/30/2024 11:45 AM EDT -----  Regarding: Pain meds  Contact: [phone]  Tiara    I m not sure who I contact.  I left Dr. Murphy a messenger with regard to the  oxycodone    Prescribed for my knee replacement.  I had a full left knee replacement.  The pain is off the charts, it is unbearable.  Can I get something stronger? I messaged Dr. Murphy early this morning, not sure who needs to call it in.  Thank you.

## 2024-05-01 NOTE — TELEPHONE ENCOUNTER
"Patient states she took Oxycodone last night, and it didn't \"do any good\". A while later she took 2 Norco, and states it helped more. States Dr. Murphy told her to try taking 2 Oxycodone, and to take OTC Tylenol with it, but she wanted to reach out to us first. Please advise.       Uvaldo Hancock MD  [id number] [place] [address] Clinical Support Staff6 minutes ago (9:47 AM)       Patient currently has prescriptions for oxycodone and hydrocodone what is she taking for the pain?     "

## 2024-05-02 ENCOUNTER — HOME CARE VISIT (OUTPATIENT)
Dept: HOME HEALTH SERVICES | Facility: HOME HEALTH | Age: 59
End: 2024-05-02
Payer: COMMERCIAL

## 2024-05-02 VITALS
RESPIRATION RATE: 18 BRPM | TEMPERATURE: 98.1 F | OXYGEN SATURATION: 98 % | HEART RATE: 78 BPM | DIASTOLIC BLOOD PRESSURE: 76 MMHG | SYSTOLIC BLOOD PRESSURE: 138 MMHG

## 2024-05-02 VITALS — HEART RATE: 105 BPM | DIASTOLIC BLOOD PRESSURE: 60 MMHG | SYSTOLIC BLOOD PRESSURE: 120 MMHG | OXYGEN SATURATION: 98 %

## 2024-05-02 PROCEDURE — G0157 HHC PT ASSISTANT EA 15: HCPCS | Mod: CQ

## 2024-05-02 PROCEDURE — G0152 HHCP-SERV OF OT,EA 15 MIN: HCPCS

## 2024-05-02 ASSESSMENT — ENCOUNTER SYMPTOMS
PERSON REPORTING PAIN: PATIENT
PAIN LOCATION - EXACERBATING FACTORS: MOVEMENT
LOWEST PAIN SEVERITY IN PAST 24 HOURS: 3/10
PAIN LOCATION: LEFT KNEE
SUBJECTIVE PAIN PROGRESSION: GRADUALLY IMPROVING
PAIN: 1
PAIN LOCATION - PAIN FREQUENCY: INTERMITTENT
PAIN SEVERITY GOAL: 0/10
HIGHEST PAIN SEVERITY IN PAST 24 HOURS: 10/10
HIGHEST PAIN SEVERITY IN PAST 24 HOURS: 7/10
PERSON REPORTING PAIN: PATIENT
PAIN LOCATION - RELIEVING FACTORS: ICE, MEDS
PAIN LOCATION - PAIN SEVERITY: 5/10
PAIN LOCATION - PAIN QUALITY: THROBBING
SUBJECTIVE PAIN PROGRESSION: GRADUALLY IMPROVING
PAIN: 1
LOWEST PAIN SEVERITY IN PAST 24 HOURS: 5/10
PAIN LOCATION: LEFT KNEE
PAIN LOCATION - PAIN FREQUENCY: CONSTANT
PAIN LOCATION - PAIN SEVERITY: 6/10

## 2024-05-02 ASSESSMENT — ACTIVITIES OF DAILY LIVING (ADL)
DRESSING_LB_CURRENT_FUNCTION: SUPERVISION
BATHING_CURRENT_FUNCTION: SUPERVISION
TOILETING: SUPERVISION
PREPARING MEALS: INDEPENDENT
BATHING ASSESSED: 1
TOILETING: 1
DRESSING_UB_CURRENT_FUNCTION: INDEPENDENT

## 2024-05-03 ENCOUNTER — HOME CARE VISIT (OUTPATIENT)
Dept: HOME HEALTH SERVICES | Facility: HOME HEALTH | Age: 59
End: 2024-05-03
Payer: COMMERCIAL

## 2024-05-06 ENCOUNTER — HOME CARE VISIT (OUTPATIENT)
Dept: HOME HEALTH SERVICES | Facility: HOME HEALTH | Age: 59
End: 2024-05-06
Payer: COMMERCIAL

## 2024-05-06 VITALS
OXYGEN SATURATION: 98 % | TEMPERATURE: 98.1 F | RESPIRATION RATE: 18 BRPM | DIASTOLIC BLOOD PRESSURE: 78 MMHG | HEART RATE: 78 BPM | SYSTOLIC BLOOD PRESSURE: 126 MMHG

## 2024-05-06 PROCEDURE — G0157 HHC PT ASSISTANT EA 15: HCPCS | Mod: CQ

## 2024-05-06 ASSESSMENT — ENCOUNTER SYMPTOMS
PERSON REPORTING PAIN: PATIENT
SUBJECTIVE PAIN PROGRESSION: GRADUALLY IMPROVING
PAIN LOCATION - PAIN FREQUENCY: INTERMITTENT
HIGHEST PAIN SEVERITY IN PAST 24 HOURS: 7/10
PAIN SEVERITY GOAL: 0/10
LOWEST PAIN SEVERITY IN PAST 24 HOURS: 3/10
PAIN: 1
PAIN LOCATION - PAIN SEVERITY: 6/10
PAIN LOCATION: LEFT KNEE
PAIN LOCATION - PAIN QUALITY: THROBBING

## 2024-05-07 ENCOUNTER — TELEPHONE (OUTPATIENT)
Dept: PRIMARY CARE | Facility: CLINIC | Age: 59
End: 2024-05-07
Payer: COMMERCIAL

## 2024-05-07 DIAGNOSIS — M25.561 CHRONIC PAIN OF BOTH KNEES: Primary | ICD-10-CM

## 2024-05-07 DIAGNOSIS — M25.562 CHRONIC PAIN OF BOTH KNEES: Primary | ICD-10-CM

## 2024-05-07 DIAGNOSIS — G89.29 CHRONIC PAIN OF BOTH KNEES: Primary | ICD-10-CM

## 2024-05-07 RX ORDER — TRAMADOL HYDROCHLORIDE 50 MG/1
50 TABLET ORAL EVERY 6 HOURS PRN
Qty: 28 TABLET | Refills: 0 | Status: SHIPPED | OUTPATIENT
Start: 2024-05-07 | End: 2024-05-14

## 2024-05-07 NOTE — TELEPHONE ENCOUNTER
----- Message from Festus Menjivar sent at 5/7/2024  2:11 PM EDT -----  Regarding: Opioids   Contact: [phone]  I know a part of my healing is to make sure I’m not dependent on opioids, is there another pain medication that could be prescribed without an opioid in it?  I am in the  healing phase of the sharp burning pain along with spasms.  I have not been able to sleep at night.  I have been doing  everything that the PT is asking.  The Oxycodone I have is almost gone, I’m not a huge fan of it as it messes with my memory.  I know in the hospital I received Tramadol, not sure if that’s an opioid or an option as I was asked by the PT how much I was taking?  Thanks for your time.

## 2024-05-07 NOTE — TELEPHONE ENCOUNTER
Pairy message sent. Waiting on reply.    Uvaldo Hancock MD  [id number] [place] [address] Clinical Support Staff10 minutes ago (2:57 PM)       We can try switching to tramadol instead of the oxycodone if she would like to try that

## 2024-05-07 NOTE — TELEPHONE ENCOUNTER
----- Message from Festus Menjivar sent at 5/7/2024  3:54 PM EDT -----  Regarding: OPIOIDS  Contact: [phone]  Hi, I think I would like to try it.  Will that work pretty well in managing the pain?  I would love to sleep at night. If that will help with the pain, I would like to give it a try.  Should I still take the flexiril with it.  I trust whatever Dr. Hancock thinks is best,  he has gotten me this far.  Even suggested the surgeon! I thank him for that.  The pain is changing now I get a cramping burning pain or what I think is a cramp, it comes in waves.  Right now as I am typing this it’s doing it.  We are in the homestretch! I purchased the home ice therapy machine.  Great invention! Thank you Willem, sorry this is so lengthy.  It’s hard when you are in the worse pain at the same time I am needing to get off the  Opioids.

## 2024-05-07 NOTE — TELEPHONE ENCOUNTER
Patient is agreeable to Tramadol. Also wondering if she should still take Flexeril with it? Please advise.

## 2024-05-08 ENCOUNTER — HOME CARE VISIT (OUTPATIENT)
Dept: HOME HEALTH SERVICES | Facility: HOME HEALTH | Age: 59
End: 2024-05-08
Payer: COMMERCIAL

## 2024-05-08 VITALS
DIASTOLIC BLOOD PRESSURE: 78 MMHG | HEART RATE: 78 BPM | RESPIRATION RATE: 18 BRPM | OXYGEN SATURATION: 97 % | SYSTOLIC BLOOD PRESSURE: 118 MMHG | TEMPERATURE: 97.8 F

## 2024-05-08 PROCEDURE — G0157 HHC PT ASSISTANT EA 15: HCPCS | Mod: CQ

## 2024-05-08 ASSESSMENT — ENCOUNTER SYMPTOMS
PAIN LOCATION - PAIN SEVERITY: 4/10
PAIN LOCATION - EXACERBATING FACTORS: EXTENDED USE
LOWEST PAIN SEVERITY IN PAST 24 HOURS: 2/10
SUBJECTIVE PAIN PROGRESSION: GRADUALLY IMPROVING
PERSON REPORTING PAIN: PATIENT
PAIN: 1
PAIN LOCATION - PAIN QUALITY: THROBBING
PAIN LOCATION: LEFT KNEE
PAIN LOCATION - RELIEVING FACTORS: MEDS, ICE, REST
PAIN SEVERITY GOAL: 0/10
HIGHEST PAIN SEVERITY IN PAST 24 HOURS: 6/10
PAIN LOCATION - PAIN FREQUENCY: FREQUENT

## 2024-05-10 ENCOUNTER — HOME CARE VISIT (OUTPATIENT)
Dept: HOME HEALTH SERVICES | Facility: HOME HEALTH | Age: 59
End: 2024-05-10
Payer: COMMERCIAL

## 2024-05-10 VITALS
SYSTOLIC BLOOD PRESSURE: 118 MMHG | OXYGEN SATURATION: 98 % | RESPIRATION RATE: 18 BRPM | TEMPERATURE: 97.8 F | HEART RATE: 78 BPM | DIASTOLIC BLOOD PRESSURE: 78 MMHG

## 2024-05-10 PROCEDURE — G0157 HHC PT ASSISTANT EA 15: HCPCS | Mod: CQ

## 2024-05-10 ASSESSMENT — ENCOUNTER SYMPTOMS
PAIN LOCATION: LEFT KNEE
PAIN LOCATION - PAIN SEVERITY: 4/10
PERSON REPORTING PAIN: PATIENT
PAIN LOCATION - PAIN FREQUENCY: INTERMITTENT
PAIN: 1
PAIN SEVERITY GOAL: 0/10
PAIN LOCATION - RELIEVING FACTORS: ICE, MEDS
PAIN LOCATION - PAIN QUALITY: THROBBING
SUBJECTIVE PAIN PROGRESSION: GRADUALLY IMPROVING
HIGHEST PAIN SEVERITY IN PAST 24 HOURS: 6/10
LOWEST PAIN SEVERITY IN PAST 24 HOURS: 2/10

## 2024-05-13 DIAGNOSIS — G89.29 CHRONIC PAIN OF LEFT KNEE: ICD-10-CM

## 2024-05-13 DIAGNOSIS — M25.562 CHRONIC PAIN OF LEFT KNEE: ICD-10-CM

## 2024-05-13 DIAGNOSIS — Z96.652 S/P TOTAL KNEE ARTHROPLASTY, LEFT: ICD-10-CM

## 2024-05-13 RX ORDER — CYCLOBENZAPRINE HCL 10 MG
10 TABLET ORAL 3 TIMES DAILY PRN
Qty: 90 TABLET | Refills: 0 | Status: SHIPPED | OUTPATIENT
Start: 2024-05-13 | End: 2024-06-04 | Stop reason: SDUPTHER

## 2024-05-13 RX ORDER — HYDROCODONE BITARTRATE AND ACETAMINOPHEN 5; 325 MG/1; MG/1
1 TABLET ORAL EVERY 6 HOURS PRN
Qty: 120 TABLET | Refills: 0 | Status: SHIPPED | OUTPATIENT
Start: 2024-05-13

## 2024-05-13 NOTE — TELEPHONE ENCOUNTER
Patient called back and she wanted to know if she could get a rx refill on the muscle relaxants, Flexeril 10 mg and also the pain medication, the lower dose of the Norco 5 mg. She is still having some pain from the knee surgery and doesn't want to take the 7.5 mg, she wants to go off of that and go down to the 5 mg she was taking    Please send to [facility] in [place]           Coronavirus infection

## 2024-05-13 NOTE — TELEPHONE ENCOUNTER
----- Message from Festus Menjivar sent at 5/12/2024 12:06 PM EDT -----  Regarding: Pain medication and muscle relaxers  Contact: [phone]  Good morning, can I get a refill on the muscle relaxers and pain meds?  I know the Norco works the best, but I am out of them and too early to refill is it ok to take those down to the lower dosage I think I  used to take a 5, but then had the 7.5. I’m not sure I have that right. It’s the lower dose than I am on now.  Is that a possibility?

## 2024-05-14 ENCOUNTER — HOME CARE VISIT (OUTPATIENT)
Dept: HOME HEALTH SERVICES | Facility: HOME HEALTH | Age: 59
End: 2024-05-14
Payer: COMMERCIAL

## 2024-05-14 ENCOUNTER — TELEPHONE (OUTPATIENT)
Dept: PRIMARY CARE | Facility: CLINIC | Age: 59
End: 2024-05-14
Payer: COMMERCIAL

## 2024-05-14 VITALS
TEMPERATURE: 97.8 F | RESPIRATION RATE: 18 BRPM | HEART RATE: 78 BPM | OXYGEN SATURATION: 98 % | DIASTOLIC BLOOD PRESSURE: 78 MMHG | SYSTOLIC BLOOD PRESSURE: 120 MMHG

## 2024-05-14 PROCEDURE — G0157 HHC PT ASSISTANT EA 15: HCPCS | Mod: CQ

## 2024-05-14 ASSESSMENT — ENCOUNTER SYMPTOMS
PAIN LOCATION - EXACERBATING FACTORS: EXTENDED USE
PAIN LOCATION - RELIEVING FACTORS: ICE, MEDS
PAIN: 1
PAIN LOCATION - PAIN FREQUENCY: INTERMITTENT
SUBJECTIVE PAIN PROGRESSION: GRADUALLY IMPROVING
HIGHEST PAIN SEVERITY IN PAST 24 HOURS: 5/10
PAIN LOCATION - PAIN SEVERITY: 4/10
PAIN LOCATION: LEFT KNEE
PERSON REPORTING PAIN: PATIENT
PAIN LOCATION - PAIN QUALITY: THROBBING
LOWEST PAIN SEVERITY IN PAST 24 HOURS: 2/10
PAIN SEVERITY GOAL: 0/10

## 2024-05-14 NOTE — TELEPHONE ENCOUNTER
----- Message from Festus Menjivar sent at 5/13/2024  4:23 PM EDT -----  Regarding: [employer] Disability question   Contact: [phone]  Hi, did you receive a notice from [employer] requesting more information.  They claim they sent it on Friday and again today.      Thank you.

## 2024-05-15 ENCOUNTER — OFFICE VISIT (OUTPATIENT)
Dept: ORTHOPEDIC SURGERY | Facility: CLINIC | Age: 59
End: 2024-05-15
Payer: COMMERCIAL

## 2024-05-15 ENCOUNTER — HOSPITAL ENCOUNTER (OUTPATIENT)
Dept: RADIOLOGY | Facility: CLINIC | Age: 59
Discharge: HOME | End: 2024-05-15
Payer: COMMERCIAL

## 2024-05-15 DIAGNOSIS — Z96.652 S/P TOTAL KNEE ARTHROPLASTY, LEFT: ICD-10-CM

## 2024-05-15 DIAGNOSIS — Z96.652 AFTERCARE FOLLOWING LEFT KNEE JOINT REPLACEMENT SURGERY: Primary | ICD-10-CM

## 2024-05-15 DIAGNOSIS — Z47.1 AFTERCARE FOLLOWING LEFT KNEE JOINT REPLACEMENT SURGERY: Primary | ICD-10-CM

## 2024-05-15 PROCEDURE — 73562 X-RAY EXAM OF KNEE 3: CPT | Mod: LEFT SIDE | Performed by: ORTHOPAEDIC SURGERY

## 2024-05-15 PROCEDURE — 3008F BODY MASS INDEX DOCD: CPT | Performed by: PHYSICIAN ASSISTANT

## 2024-05-15 PROCEDURE — 99024 POSTOP FOLLOW-UP VISIT: CPT | Performed by: PHYSICIAN ASSISTANT

## 2024-05-15 PROCEDURE — 73562 X-RAY EXAM OF KNEE 3: CPT | Mod: LT

## 2024-05-16 ENCOUNTER — HOME CARE VISIT (OUTPATIENT)
Dept: HOME HEALTH SERVICES | Facility: HOME HEALTH | Age: 59
End: 2024-05-16
Payer: COMMERCIAL

## 2024-05-16 VITALS — RESPIRATION RATE: 20 BRPM

## 2024-05-16 PROCEDURE — G0151 HHCP-SERV OF PT,EA 15 MIN: HCPCS

## 2024-05-16 SDOH — HEALTH STABILITY: PHYSICAL HEALTH: PHYSICAL EXERCISE: 15

## 2024-05-16 SDOH — HEALTH STABILITY: PHYSICAL HEALTH: EXERCISE TYPE: TKR PROTOCOL

## 2024-05-16 SDOH — HEALTH STABILITY: PHYSICAL HEALTH: EXERCISE ACTIVITIES SETS: 1

## 2024-05-16 SDOH — HEALTH STABILITY: PHYSICAL HEALTH: EXERCISE ACTIVITY: TKR PROTOCOL

## 2024-05-16 SDOH — HEALTH STABILITY: PHYSICAL HEALTH: PHYSICAL EXERCISE: SUPINE, SITTING AND STANDING

## 2024-05-16 ASSESSMENT — ACTIVITIES OF DAILY LIVING (ADL)
AMBULATION_DISTANCE/DURATION_TOLERATED: 200 FEET
OASIS_M1830: 01
HOME_HEALTH_OASIS: 00
AMBULATION ASSISTANCE ON FLAT SURFACES: 1

## 2024-05-16 ASSESSMENT — ENCOUNTER SYMPTOMS
LOWEST PAIN SEVERITY IN PAST 24 HOURS: 3/10
PAIN SEVERITY GOAL: 0/10
HIGHEST PAIN SEVERITY IN PAST 24 HOURS: 5/10
PERSON REPORTING PAIN: PATIENT
PAIN LOCATION: LEFT KNEE
LIMITED RANGE OF MOTION: 1
PAIN: 1
MUSCLE WEAKNESS: 1
SUBJECTIVE PAIN PROGRESSION: GRADUALLY IMPROVING

## 2024-05-16 NOTE — PROGRESS NOTES
Subjective    Patient ID: Festus    Chief Complaint:   Chief Complaint   Patient presents with    Left Knee - Follow-up     LT JATINDER TKR 4/29/24  Xrays today      History of present illness    58-year-old female presented to clinic today for first postop follow-up visit status post left total knee Jatinder performed 4/29/2024.  Overall doing very well at this time.  She is very pleased with the outcome.  She notices a drastic change in her overall alignment of the left knee as well as stability.  She seems to be very grateful at this time.  She is ambulating with the assistance of a walker today however she states that while at home she uses nothing.  Denies fevers chills no numbness tingling.  No instability.  She is now just been using hydrocodone 5 mg.  Preoperatively she was on hydrocodone 7.5's but has noticed that she only needs the lower dose.  She is looking forward to continuing outpatient physical therapy out in [place].      Past medical , Surgical, Family and social history reviewed.      Physical exam  General: No acute distress and breathing comfortably.  Patient is pleasant and cooperative with the examination.    Extremity  Left knee is neurovascular intact.  Range of motion is 5 to 89 degrees.  Mild edema of the left knee.  Mild ecchymosis all the way down to the left heel.  No sign of DVT.  Incision is well-healed well-approximated without abnormality or infection.  Wound is clean dry and intact.  Compartments soft.  Capillary refill within normal is distally.    Diagnostics  [ none]  XR knee left 3 views    Result Date: 5/15/2024  Interpreted By:  Darrion Murphy, STUDY: XR KNEE LEFT 3 VIEWS; 5/15/2024 2:28 pm   INDICATION: Signs/Symptoms:pain.   ACCESSION NUMBER(S): [id number]   ORDERING CLINICIAN: DARRION MURPHY   FINDINGS: Three views of the knee show status post joint replacement in good alignment.  There are no signs of fracture or dislocation.  No other bony abnormalities        Signed by: Darrion Murphy 5/15/2024 3:52 PM Dictation workstation:   [id number]    XR knee left 1-2 views    Result Date: 4/29/2024  Interpreted By:  Amalia Miller, STUDY: Left knee, two views   INDICATION: Signs/Symptoms:Post-op knee.   COMPARISON: 02/21/2024.   ACCESSION NUMBER(S): [id number]   ORDERING CLINICIAN: DARRION MURPHY   FINDINGS: No acute fracture or malalignment. Immediate postsurgical changes of left total knee arthroplasty. Hardware is intact without perihardware fractures or lucencies. Expected postsurgical soft tissue gas within the knee joint.       1. Immediate postsurgical changes of left total knee arthroplasty without hardware complication.   MACRO: None.   Signed by: Amalia Miller 4/29/2024 11:06 AM Dictation workstation:   [id number]    CT knee left wo IV contrast    Result Date: 4/16/2024  Interpreted By:  Raffi Woods, STUDY: CT KNEE LEFT WO IV CONTRAST;  4/16/2024 11:31 am   INDICATION: Signs/Symptoms:pain.   COMPARISON: Radiographs 02/21/2024.   ACCESSION NUMBER(S): [id number]   ORDERING CLINICIAN: DARRION MURPHY   TECHNIQUE: CT imaging of the left knee was obtained without administration of intravenous contrast medium. Coronal and sagittal reformatted images were performed.   FINDINGS: OSSEOUS STRUCTURES: No acute fracture or dislocation. Tricompartment knee osteoarthritis with a small knee joint effusion and mild to moderate synovitis. Maintained hip and tibiotalar joints. Partially imaged midfoot osteoarthritis. No sizable hip or tibiotalar joint effusions   SOFT TISSUES: Muscle bulk is maintained. No fluid collection or suspicious soft tissue mass.       Preoperative CT. Tricompartment knee osteoarthritis with small knee joint effusion and mild-to-moderate synovitis.   MACRO: None   Signed by: aRffi Woods 4/16/2024 12:12 PM Dictation workstation:   [id number]       Procedure  [ none]    Assessment  Status post left total knee Jatinder    Treatment plan  1.   This time we had a long discussion regards to continued outpatient physical therapy.  She will continue with hydrocodone 5 mg as needed.  2.  Wound instructions were discussed.  She was encouraged to continue with weightbearing activity as tolerated.  3.  She will follow-up with us in approximately 3 weeks for reevaluation without x-ray.  4.  All of the patient's questions were answered.    Orders Placed This Encounter    XR knee left 3 views    Referral to Physical Therapy       This note was prepared using voice recognition software.  The details of this note are correct and have been reviewed, and corrected to the best of my ability.  Some grammatical areas may persist related to the Dragon software    Carter Sainz PA-C, [facility]  [facility]  Orthopedic [place]    [phone]

## 2024-05-17 ENCOUNTER — APPOINTMENT (OUTPATIENT)
Dept: ORTHOPEDIC SURGERY | Facility: CLINIC | Age: 59
End: 2024-05-17
Payer: COMMERCIAL

## 2024-05-21 ENCOUNTER — EVALUATION (OUTPATIENT)
Dept: PHYSICAL THERAPY | Facility: CLINIC | Age: 59
End: 2024-05-21
Payer: COMMERCIAL

## 2024-05-21 DIAGNOSIS — Z96.652 S/P TOTAL KNEE ARTHROPLASTY, LEFT: Primary | ICD-10-CM

## 2024-05-21 DIAGNOSIS — Z47.1 AFTERCARE FOLLOWING LEFT KNEE JOINT REPLACEMENT SURGERY: ICD-10-CM

## 2024-05-21 DIAGNOSIS — G89.29 CHRONIC PAIN OF LEFT KNEE: ICD-10-CM

## 2024-05-21 DIAGNOSIS — M25.562 CHRONIC PAIN OF LEFT KNEE: ICD-10-CM

## 2024-05-21 DIAGNOSIS — Z96.652 AFTERCARE FOLLOWING LEFT KNEE JOINT REPLACEMENT SURGERY: ICD-10-CM

## 2024-05-21 PROCEDURE — 97110 THERAPEUTIC EXERCISES: CPT | Mod: GP | Performed by: PHYSICAL THERAPIST

## 2024-05-21 PROCEDURE — 97161 PT EVAL LOW COMPLEX 20 MIN: CPT | Mod: GP | Performed by: PHYSICAL THERAPIST

## 2024-05-21 SDOH — ECONOMIC STABILITY: FOOD INSECURITY: WITHIN THE PAST 12 MONTHS, THE FOOD YOU BOUGHT JUST DIDN'T LAST AND YOU DIDN'T HAVE MONEY TO GET MORE.: NEVER TRUE

## 2024-05-21 SDOH — ECONOMIC STABILITY: FOOD INSECURITY: WITHIN THE PAST 12 MONTHS, YOU WORRIED THAT YOUR FOOD WOULD RUN OUT BEFORE YOU GOT MONEY TO BUY MORE.: NEVER TRUE

## 2024-05-21 ASSESSMENT — ENCOUNTER SYMPTOMS
LOSS OF SENSATION IN FEET: 0
DEPRESSION: 0
OCCASIONAL FEELINGS OF UNSTEADINESS: 1

## 2024-05-21 ASSESSMENT — PAIN SCALES - GENERAL: PAINLEVEL_OUTOF10: 4

## 2024-05-21 ASSESSMENT — PAIN - FUNCTIONAL ASSESSMENT: PAIN_FUNCTIONAL_ASSESSMENT: 0-10

## 2024-05-21 NOTE — PROGRESS NOTES
Physical Therapy    Physical Therapy Evaluation    Patient Name: Festus Menjivar  MRN: [id number]  Today's Date: 5/21/2024  Payor: [employer] [facility] / Plan: [employer] / Product Type: *No Product type* /   Time Calculation  Start Time: 1525  Stop Time: 1600  Time Calculation (min): 35 min  PT Evaluation Time Entry  PT Evaluation (Low) Time Entry: 20  PT Therapeutic Procedures Time Entry  Therapeutic Exercise Time Entry: 15                   Current Problem  1. S/P total knee arthroplasty, left  Follow Up In Physical Therapy      2. Chronic pain of left knee  Follow Up In Physical Therapy      3. Aftercare following left knee joint replacement surgery  Referral to Physical Therapy    Follow Up In Physical Therapy           Problem List Items Addressed This Visit             ICD-10-CM    Chronic pain of left knee M25.562, G89.29    Relevant Orders    Follow Up In Physical Therapy    S/P total knee arthroplasty, left - Primary Z96.652    Relevant Orders    Follow Up In Physical Therapy     Other Visit Diagnoses         Codes    Aftercare following left knee joint replacement surgery     Z47.1, Z96.652    Relevant Orders    Follow Up In Physical Therapy            SUBJECTIVE  Subjective   General  Reason for Referral: s/p L TKA  Referred By: Dr. Murphy  Past Medical History Relevant to Rehab: Asthma  General Comment: 40 V limit combined with OT, all remain, no auth  Precautions  Precautions  STEADI Fall Risk Score (The score of 4 or more indicates an increased risk of falling): 4+ d/t post op  Post-Surgical Precautions: Left total knee precautions  Precautions Comment: DOS 4/29/24       Pain  Pain Assessment: 0-10  Pain Score: 4  Pain Location: Knee  Pain Orientation: Left    SUBJECTIVE:   Chief complaint: s/p L TKA  DOS 4/29/24    Patient reports long standing history for years of L knee pain 2/2 L knee OA. Underwent surgical intervention to address on 4/29/24. Reports uncomplicated recovery thus  far. Arrives to PT this date to address ongoing deficits limiting return to PLOF unrestricted and symptom free.     Pain is located L knee global, is constant but variable, ranging from 4-10/10. Described as sharp, ache, stiff. Aggravated by: standing, walking, stairs , Alleviated by: ice, rest, meds       Prior level of function: Ind, unrestricted, and pain free with ADLs, HHCs, self care, and work tasks.     Current functional level:  Pain limits the patient's ability to stand, walk, negotiate stairs, and transfers   Home set up: no concerns    Patient Goal for Therapy: to walk normally     Work status: off work temporarily,  which involves prolonged walking, stairs, standing    OBJECTIVE    Lower Extremity ROM:  Date:      RIGHT LEFT   Knee Extension NL -10   Knee Flexion NL 85     Girth:   15 cm proximal jt Line (R) 54.2 cm (L) 53.2 cm  Jt Line (R) 41.7 cm (L) 45.5 cm  15 cm distal jt line (R) 38.5 cm (L) 33.2 cm     Functional Movements:   Date EVAL   Sit to/From Stand Ind   Supine to/from Sit  Ind    Gait  Antalgic      Outcome Measures:  Other Measures  Lower Extremity Funtional Score (LEFS): 33/80     TREATMENT:  TherEx: Patient education, HEP completed, provided, and reviewed     Access Code: [email or web address]  URL: [email or web address]  Date: 05/21/2024  Prepared by: Nani Lundy    Exercises  - Supine Ankle Pumps  - 3 x daily - 7 x weekly - 3 sets - 10 reps  - Supine Quadricep Sets  - 3 x daily - 7 x weekly - 3 sets - 10 reps  - Gluteal Sets  - 3 x daily - 7 x weekly - 3 sets - 10 reps  - Supine Heel Slide with Strap  - 3 x daily - 7 x weekly - 3 sets - 10 reps  - Long Sitting Quad Set with Towel Roll Under Heel  - 3 x daily - 7 x weekly - 3 sets - 10 reps - 5 seconds  hold    Patient Education  - Ice    ASSESSEMENT  PT Assessment  PT Assessment Results: Decreased strength, Decreased range of motion, Decreased endurance, Impaired balance, Decreased mobility, Orthopedic  restrictions, Pain  Rehab Prognosis: Good  Barriers to Discharge: none    Patient presents to PT this date with S&S consistent with expected deficits s/p L TKA . They demos loss of ROM, strength, flexibility, jt mobility, soft tissue mobility, impaired body mechanics, and postural control contributing to ongoing pain and difficulty with sitting, standing, walking, curb and stair negotiation, and transfers from chair, bed, and car. They will benefit from skilled PT intervention to address deficits and limitations listed above in order to return patient to PLOF without restriction.      The physical therapy prognosis is good for the patient to achieve their goals.   The pt tolerated therapy treatment today well with no adverse effects.    Clinical presentation: Stable and/or uncomplicated characteristics  Level of clinical decision making is Low        PLAN  OP PT Plan  Treatment/Interventions: Cryotherapy, Education/ Instruction, Electrical stimulation, Gait training, Hot pack, Manual therapy, Neuromuscular re-education, Taping techniques, Therapeutic activities, Therapeutic exercises, Vasopneumatic device  PT Plan: Skilled PT  PT Frequency: 2 times per week  Duration: 8 weeks  Rehab Potential: Good  Plan of Care Agreement: Patient    Goals:  Active       PT Problem       LEFS 70+       Start:  05/21/24    Expected End:  07/16/24            Pt will demo improved AROM in L knee(s) >/= 120 FLEX and 0 EXT for improved ease with functional activities, mobility, and transfers for progression toward independence with ADL's & IADL's.         Start:  05/21/24    Expected End:  07/16/24            Pt will demo improved MMT to at least 4/5 on 0-5 point scale in BLE for improved strength and stability, and improved ease with transfers, lifting/carrying, and proper mechanics with ADL's & IADL's.         Start:  05/21/24    Expected End:  07/16/24            Patient will be able to tolerate continuous ambulation for at least 30+  minutes or greater without pain or limitation from prior level of function to improve safety and function with community mobility.         Start:  05/21/24    Expected End:  07/16/24            Patient will demonstrate improved standing tolerance to 60+ mins, independently, with good mechanics, and without assistance to allow for improved safety and function with ADLs, HHCs, and all functional mobility.          Start:  05/21/24    Expected End:  07/16/24            Pt will demo reciprocal gait pattern with ascent and descent of full flight of stairs with normal mechanics and without pain to allow for return to PLOF and safety with home and community mobility.         Start:  05/21/24    Expected End:  07/16/24

## 2024-05-23 DIAGNOSIS — Z96.652 AFTERCARE FOLLOWING LEFT KNEE JOINT REPLACEMENT SURGERY: ICD-10-CM

## 2024-05-23 DIAGNOSIS — Z47.1 AFTERCARE FOLLOWING LEFT KNEE JOINT REPLACEMENT SURGERY: ICD-10-CM

## 2024-05-24 ENCOUNTER — TELEPHONE (OUTPATIENT)
Dept: PRIMARY CARE | Facility: CLINIC | Age: 59
End: 2024-05-24
Payer: COMMERCIAL

## 2024-05-24 ENCOUNTER — TELEPHONE (OUTPATIENT)
Dept: ORTHOPEDIC SURGERY | Facility: CLINIC | Age: 59
End: 2024-05-24
Payer: COMMERCIAL

## 2024-05-24 ENCOUNTER — TREATMENT (OUTPATIENT)
Dept: PHYSICAL THERAPY | Facility: CLINIC | Age: 59
End: 2024-05-24
Payer: COMMERCIAL

## 2024-05-24 DIAGNOSIS — Z47.1 AFTERCARE FOLLOWING LEFT KNEE JOINT REPLACEMENT SURGERY: ICD-10-CM

## 2024-05-24 DIAGNOSIS — Z96.652 S/P TOTAL KNEE ARTHROPLASTY, LEFT: ICD-10-CM

## 2024-05-24 DIAGNOSIS — Z96.652 AFTERCARE FOLLOWING LEFT KNEE JOINT REPLACEMENT SURGERY: ICD-10-CM

## 2024-05-24 DIAGNOSIS — M25.562 CHRONIC PAIN OF LEFT KNEE: ICD-10-CM

## 2024-05-24 DIAGNOSIS — G89.29 CHRONIC PAIN OF LEFT KNEE: ICD-10-CM

## 2024-05-24 PROCEDURE — 97140 MANUAL THERAPY 1/> REGIONS: CPT | Mod: GP | Performed by: PHYSICAL THERAPIST

## 2024-05-24 PROCEDURE — 97110 THERAPEUTIC EXERCISES: CPT | Mod: GP | Performed by: PHYSICAL THERAPIST

## 2024-05-24 RX ORDER — OXYCODONE HYDROCHLORIDE 5 MG/1
5 TABLET ORAL EVERY 8 HOURS PRN
Qty: 20 TABLET | Refills: 0 | Status: SHIPPED | OUTPATIENT
Start: 2024-05-24 | End: 2024-05-29 | Stop reason: SDUPTHER

## 2024-05-24 ASSESSMENT — PAIN SCALES - GENERAL: PAINLEVEL_OUTOF10: 3

## 2024-05-24 ASSESSMENT — PAIN DESCRIPTION - DESCRIPTORS: DESCRIPTORS: TIGHTNESS

## 2024-05-24 ASSESSMENT — PAIN - FUNCTIONAL ASSESSMENT: PAIN_FUNCTIONAL_ASSESSMENT: 0-10

## 2024-05-24 NOTE — TELEPHONE ENCOUNTER
Following up from the my chart message , and after talking to Brenda, I told the pt that Carter is off today , but it is in his worklist and will send it in when he is available , pt expressed understanding

## 2024-05-24 NOTE — TELEPHONE ENCOUNTER
Please advise    ----- Message from Festus Menjivar sent at 5/24/2024 12:54 PM EDT -----  Regarding: Oxycodone  Contact: [phone]  Good morning,    I am not sure if you can help.  I think you have access to all messages.  Carter from Dr. Murphy’s office was going yo call me in this medication.  I checked with the pharmacy and they do not have it and I have tried unsuccessfully via text and phone calls to reach Dr. Murphy’s office regarding this medication.  I know it’s going to be a long weekend and  just want to have my pain under control.  Is there any way you can check this for me?

## 2024-05-24 NOTE — PROGRESS NOTES
Physical Therapy    Physical Therapy Treatment    Patient Name: Festus Menjivar  MRN: [id number]  Today's Date: 5/24/2024  Time Calculation  Start Time: 1358  Stop Time: 1440  Time Calculation (min): 42 min     PT Therapeutic Procedures Time Entry  Manual Therapy Time Entry: 10  Therapeutic Exercise Time Entry: 32                   Current Problem  1. S/P total knee arthroplasty, left  Follow Up In Physical Therapy      2. Chronic pain of left knee  Follow Up In Physical Therapy      3. Aftercare following left knee joint replacement surgery  Follow Up In Physical Therapy        Problem List Items Addressed This Visit             ICD-10-CM    Chronic pain of left knee M25.562, G89.29    S/P total knee arthroplasty, left Z96.652     Other Visit Diagnoses         Codes    Aftercare following left knee joint replacement surgery     Z47.1, Z96.652            SUBJECTIVE  Subjective   General  Reason for Referral: s/p L TKA  Referred By: Dr. Murphy  Past Medical History Relevant to Rehab: Asthma  General Comment: 40 V limit combined with OT, all remain, no auth  Precautions  Precautions  Post-Surgical Precautions: Left total knee precautions  Precautions Comment: DOS 4/29/24       Pain  Pain Assessment: 0-10  Pain Score: 3  Pain Location: Knee  Pain Orientation: Left  Pain Descriptors: Tightness    Patient reports she felt really good yesterday. States her stiffness is back today but overall she feels she is doing more of her normal routine.   Patient reports compliance with HEP.     VISIT NUMBER: 3  EVAL 5/21/24   Re-Check visit 10   Auth not required      Treatments:  TherEx:  Stepper 5 min   IB 2x1 min   HSS 2x30 seconds (L)   Step Knee Flexion Stretch 10/10, 2 min   Shuttle 4 bands, 2x10   SL Shuttle 3 bands, 2x10   LLLD Knee Extension Stretch with MHP 4 min   Heel Slides on PB 3 min   Prone TKE 5/5, 2 min      Manual:  Myofascial Roll Out to (L) HS, (L) GSS     Objective   No objective measures assessed this visit        Assessment:   Able to initiate interventions this date to improve motion and strength of L knee. Patient appropriately challenged to address deficits. Demos expected ongoing deficits in motion, strength, and muscle length.       This session exercises were progressed (p) or added (NEW) as documented in treatment section.  Pt required minimal to moderate cues and demonstration to complete exercises with proper form and responded without adverse effects.        Plan:     Continue to progress treatment to address strength, motion, jt mobility, soft tissue mobility, and flexibility deficits impacting patient's return to PLOF unrestricted and symptom free.     Goals:  Active       PT Problem       LEFS 70+       Start:  05/21/24    Expected End:  07/16/24            Pt will demo improved AROM in L knee(s) >/= 120 FLEX and 0 EXT for improved ease with functional activities, mobility, and transfers for progression toward independence with ADL's & IADL's.         Start:  05/21/24    Expected End:  07/16/24            Pt will demo improved MMT to at least 4/5 on 0-5 point scale in BLE for improved strength and stability, and improved ease with transfers, lifting/carrying, and proper mechanics with ADL's & IADL's.         Start:  05/21/24    Expected End:  07/16/24            Patient will be able to tolerate continuous ambulation for at least 30+ minutes or greater without pain or limitation from prior level of function to improve safety and function with community mobility.         Start:  05/21/24    Expected End:  07/16/24            Patient will demonstrate improved standing tolerance to 60+ mins, independently, with good mechanics, and without assistance to allow for improved safety and function with ADLs, HHCs, and all functional mobility.          Start:  05/21/24    Expected End:  07/16/24            Pt will demo reciprocal gait pattern with ascent and descent of full flight of stairs with normal mechanics and  without pain to allow for return to PLOF and safety with home and community mobility.         Start:  05/21/24    Expected End:  07/16/24

## 2024-05-24 NOTE — TELEPHONE ENCOUNTER
She already has her regular prescription of tramadol and hydrocodone she should not be getting more oxycodone.  She has had this surgery pain should be improving I will not give her more pain medication and neither will the pharmacist     LMOM FOR PATIENT TO CALL BACK.

## 2024-05-29 ENCOUNTER — TREATMENT (OUTPATIENT)
Dept: PHYSICAL THERAPY | Facility: CLINIC | Age: 59
End: 2024-05-29
Payer: COMMERCIAL

## 2024-05-29 ENCOUNTER — PATIENT MESSAGE (OUTPATIENT)
Dept: ORTHOPEDIC SURGERY | Facility: CLINIC | Age: 59
End: 2024-05-29
Payer: COMMERCIAL

## 2024-05-29 ENCOUNTER — APPOINTMENT (OUTPATIENT)
Dept: PHYSICAL THERAPY | Facility: CLINIC | Age: 59
End: 2024-05-29
Payer: COMMERCIAL

## 2024-05-29 DIAGNOSIS — M25.562 CHRONIC PAIN OF LEFT KNEE: ICD-10-CM

## 2024-05-29 DIAGNOSIS — Z96.652 AFTERCARE FOLLOWING LEFT KNEE JOINT REPLACEMENT SURGERY: ICD-10-CM

## 2024-05-29 DIAGNOSIS — Z47.1 AFTERCARE FOLLOWING LEFT KNEE JOINT REPLACEMENT SURGERY: ICD-10-CM

## 2024-05-29 DIAGNOSIS — Z96.652 S/P TOTAL KNEE ARTHROPLASTY, LEFT: Primary | ICD-10-CM

## 2024-05-29 DIAGNOSIS — G89.29 CHRONIC PAIN OF LEFT KNEE: ICD-10-CM

## 2024-05-29 PROCEDURE — 97140 MANUAL THERAPY 1/> REGIONS: CPT | Mod: GP | Performed by: PHYSICAL THERAPIST

## 2024-05-29 PROCEDURE — 97110 THERAPEUTIC EXERCISES: CPT | Mod: GP | Performed by: PHYSICAL THERAPIST

## 2024-05-29 RX ORDER — OXYCODONE HYDROCHLORIDE 5 MG/1
5 TABLET ORAL EVERY 8 HOURS PRN
Qty: 20 TABLET | Refills: 0 | Status: SHIPPED | OUTPATIENT
Start: 2024-05-29 | End: 2024-06-05

## 2024-05-29 ASSESSMENT — PAIN - FUNCTIONAL ASSESSMENT: PAIN_FUNCTIONAL_ASSESSMENT: 0-10

## 2024-05-29 ASSESSMENT — PAIN SCALES - GENERAL: PAINLEVEL_OUTOF10: 4

## 2024-05-29 NOTE — PROGRESS NOTES
Physical Therapy    Physical Therapy Treatment    Patient Name: Festus Menjivar  MRN: [id number]  Today's Date: 5/29/2024  Time Calculation  Start Time: 1401  Stop Time: 1442  Time Calculation (min): 41 min     PT Therapeutic Procedures Time Entry  Manual Therapy Time Entry: 10  Therapeutic Exercise Time Entry: 31                   Current Problem  1. S/P total knee arthroplasty, left        2. Chronic pain of left knee        3. Aftercare following left knee joint replacement surgery          Problem List Items Addressed This Visit             ICD-10-CM    Chronic pain of left knee M25.562, G89.29    S/P total knee arthroplasty, left - Primary Z96.652     Other Visit Diagnoses         Codes    Aftercare following left knee joint replacement surgery     Z47.1, Z96.652            SUBJECTIVE  Subjective   General  Reason for Referral: s/p L TKA  Referred By: Dr. Murphy  Past Medical History Relevant to Rehab: Asthma  General Comment: 40 V limit combined with OT, all remain, no auth  Precautions  Precautions  Post-Surgical Precautions: Left total knee precautions  Precautions Comment: DOS 4/29/24       Pain  Pain Assessment: 0-10  Pain Score: 4  Pain Location: Knee  Pain Orientation: Left    Patient reports she had difficulty sleeping last night d/t soreness in L knee. Reports stiffness in morning is ongoing.   Patient reports compliance with HEP.     VISIT NUMBER: 4  EVAL 5/21/24   Re-Check visit 10   Auth not required      Treatments:  TherEx:  Bike 5 min   IB 2x1 min   HSS 2x30 seconds (L)   Step Knee Flexion Stretch 10/10, 2 min   Shuttle 4 bands, 2x10   SL Shuttle 3 bands, 2x10   LLLD Knee Extension Stretch with MHP 4 min   Heel Slides on PB 3 min   Prone TKE 5/5, 2 min      Manual:  Myofascial Roll Out to (L) ITB, (L) HS, (L) GSS        Objective   No objective measures assessed this visit       Assessment:   Patient tolerates session well. Able to complete interventions but held progressions d/t pain this  date. Advised patient to continue with ice, elevation, and stretches at home.       This session exercises were progressed (p) or added (NEW) as documented in treatment section.  Pt required minimal to moderate cues and demonstration to complete exercises with proper form and responded without adverse effects.        Plan:     Continue to progress treatment to address strength, motion, jt mobility, soft tissue mobility, and flexibility deficits impacting patient's return to PLOF unrestricted and symptom free.     Goals:  Active       PT Problem       LEFS 70+       Start:  05/21/24    Expected End:  07/16/24            Pt will demo improved AROM in L knee(s) >/= 120 FLEX and 0 EXT for improved ease with functional activities, mobility, and transfers for progression toward independence with ADL's & IADL's.         Start:  05/21/24    Expected End:  07/16/24            Pt will demo improved MMT to at least 4/5 on 0-5 point scale in BLE for improved strength and stability, and improved ease with transfers, lifting/carrying, and proper mechanics with ADL's & IADL's.         Start:  05/21/24    Expected End:  07/16/24            Patient will be able to tolerate continuous ambulation for at least 30+ minutes or greater without pain or limitation from prior level of function to improve safety and function with community mobility.         Start:  05/21/24    Expected End:  07/16/24            Patient will demonstrate improved standing tolerance to 60+ mins, independently, with good mechanics, and without assistance to allow for improved safety and function with ADLs, HHCs, and all functional mobility.          Start:  05/21/24    Expected End:  07/16/24            Pt will demo reciprocal gait pattern with ascent and descent of full flight of stairs with normal mechanics and without pain to allow for return to PLOF and safety with home and community mobility.         Start:  05/21/24    Expected End:  07/16/24

## 2024-05-31 ENCOUNTER — TREATMENT (OUTPATIENT)
Dept: PHYSICAL THERAPY | Facility: CLINIC | Age: 59
End: 2024-05-31
Payer: COMMERCIAL

## 2024-05-31 ENCOUNTER — TELEPHONE (OUTPATIENT)
Dept: PRIMARY CARE | Facility: CLINIC | Age: 59
End: 2024-05-31

## 2024-05-31 DIAGNOSIS — Z47.1 AFTERCARE FOLLOWING LEFT KNEE JOINT REPLACEMENT SURGERY: ICD-10-CM

## 2024-05-31 DIAGNOSIS — Z96.652 AFTERCARE FOLLOWING LEFT KNEE JOINT REPLACEMENT SURGERY: ICD-10-CM

## 2024-05-31 DIAGNOSIS — G89.29 CHRONIC PAIN OF LEFT KNEE: ICD-10-CM

## 2024-05-31 DIAGNOSIS — Z96.652 S/P TOTAL KNEE ARTHROPLASTY, LEFT: ICD-10-CM

## 2024-05-31 DIAGNOSIS — M25.562 CHRONIC PAIN OF LEFT KNEE: ICD-10-CM

## 2024-05-31 PROCEDURE — 97140 MANUAL THERAPY 1/> REGIONS: CPT | Mod: GP | Performed by: PHYSICAL THERAPIST

## 2024-05-31 PROCEDURE — 97110 THERAPEUTIC EXERCISES: CPT | Mod: GP | Performed by: PHYSICAL THERAPIST

## 2024-05-31 ASSESSMENT — PAIN SCALES - GENERAL: PAINLEVEL_OUTOF10: 4

## 2024-05-31 ASSESSMENT — PAIN - FUNCTIONAL ASSESSMENT: PAIN_FUNCTIONAL_ASSESSMENT: 0-10

## 2024-05-31 NOTE — TELEPHONE ENCOUNTER
[facility] [place] to call back. Also [employer] message sent to patient.    Uvaldo Hancock MD  [id number] [address] Clinical Support StaffJust now (4:46 PM)       Since she already filled the oxycodone which was a 7-day supply filled on the 29th she will will have to wait till at least June 4 or maybe even June 5 to get anything filled at that point we can send in a prescription for the hydrocodone but pharmacy will not fill anything else until that point

## 2024-05-31 NOTE — TELEPHONE ENCOUNTER
----- Message from Festus Menjivar sent at 5/31/2024  4:10 PM EDT -----  Regarding: Pain Meds  Contact: [phone]  Good afternoon,    Is it possible to get a refill on the [place] 7.5 Dr Murphy called in 20 oxycodone but it just does not work that well.  Now that I am doing the outpatient PT, my pain level is getting up there and I am also having issues with my IT band in my left leg.  I push through the exercises, but honestly, right now I am at a 9/10.  I took the medication a couple hours before PT, but I notice nothing.  I realize I have to ween myself of these, but right at this moment I’m not so sure it’s the right time.  I will understand whatever you decide.  Thank you.

## 2024-05-31 NOTE — PROGRESS NOTES
"Physical Therapy    Physical Therapy Treatment    Patient Name: Festus Menjivar  MRN: [id number]  Today's Date: 5/31/2024  Time Calculation  Start Time: 1400  Stop Time: 1442  Time Calculation (min): 42 min     PT Therapeutic Procedures Time Entry  Manual Therapy Time Entry: 10  Therapeutic Exercise Time Entry: 32                   Current Problem  1. S/P total knee arthroplasty, left  Follow Up In Physical Therapy      2. Chronic pain of left knee  Follow Up In Physical Therapy      3. Aftercare following left knee joint replacement surgery  Follow Up In Physical Therapy        Problem List Items Addressed This Visit             ICD-10-CM    Chronic pain of left knee M25.562, G89.29    S/P total knee arthroplasty, left Z96.652     Other Visit Diagnoses         Codes    Aftercare following left knee joint replacement surgery     Z47.1, Z96.652            SUBJECTIVE  Subjective   General  Reason for Referral: s/p L TKA  Referred By: Dr. Murphy  Past Medical History Relevant to Rehab: Asthma  General Comment: 40 V limit combined with OT, all remain, no auth  Precautions  Precautions  Post-Surgical Precautions: Left total knee precautions  Precautions Comment: DOS 4/29/24       Pain  Pain Assessment: 0-10  Pain Score: 4  Pain Location: Knee  Pain Orientation: Left    Patient reports her ITB was sore and woke her up the night after last session. States she is doing better today. Reports pain is no greater than 4/10 on VAS. Reports she finds herself doing movements about her home \"without thinking about it\" more often.   Patient reports compliance with HEP.     VISIT NUMBER: 5  EVAL 5/21/24   Re-Check visit 10   Auth not required      Treatments:  TherEx:  Bike 5 min   IB 2x1 min   HSS 2x30 seconds (L)   Step Knee Flexion Stretch 10/10, 2 min   Shuttle 4 bands, 2x10   SL Shuttle 3 bands, 2x10   Side Stepping Blue, 2 laps NEW   LLLD Knee Extension Stretch with MHP 4 min   Heel Slides on PB 3 min   Prone TKE 5/5, 2 min "      Manual:  Myofascial Roll Out to (L) ITB, (L) HS, (L) GSS        Objective   No objective measures assessed this visit     Assessment:     Patient tolerates session well. Able to progress strengthening to improve LLE strength and stability. Patient challenged d/t weakness but no reports of pain. Patient demos steady improvements in motion.     This session exercises were progressed (p) or added (NEW) as documented in treatment section.  Pt required minimal to moderate cues and demonstration to complete exercises with proper form and responded without adverse effects.        Plan:     Continue to progress treatment to address strength, motion, jt mobility, soft tissue mobility, and flexibility deficits impacting patient's return to PLOF unrestricted and symptom free.     Goals:  Active       PT Problem       LEFS 70+       Start:  05/21/24    Expected End:  07/16/24            Pt will demo improved AROM in L knee(s) >/= 120 FLEX and 0 EXT for improved ease with functional activities, mobility, and transfers for progression toward independence with ADL's & IADL's.         Start:  05/21/24    Expected End:  07/16/24            Pt will demo improved MMT to at least 4/5 on 0-5 point scale in BLE for improved strength and stability, and improved ease with transfers, lifting/carrying, and proper mechanics with ADL's & IADL's.         Start:  05/21/24    Expected End:  07/16/24            Patient will be able to tolerate continuous ambulation for at least 30+ minutes or greater without pain or limitation from prior level of function to improve safety and function with community mobility.         Start:  05/21/24    Expected End:  07/16/24            Patient will demonstrate improved standing tolerance to 60+ mins, independently, with good mechanics, and without assistance to allow for improved safety and function with ADLs, HHCs, and all functional mobility.          Start:  05/21/24    Expected End:  07/16/24             Pt will demo reciprocal gait pattern with ascent and descent of full flight of stairs with normal mechanics and without pain to allow for return to PLOF and safety with home and community mobility.         Start:  05/21/24    Expected End:  07/16/24

## 2024-06-03 DIAGNOSIS — G89.29 CHRONIC PAIN OF BOTH KNEES: ICD-10-CM

## 2024-06-03 DIAGNOSIS — M25.562 CHRONIC PAIN OF BOTH KNEES: ICD-10-CM

## 2024-06-03 DIAGNOSIS — M25.561 CHRONIC PAIN OF BOTH KNEES: ICD-10-CM

## 2024-06-03 RX ORDER — HYDROCODONE BITARTRATE AND ACETAMINOPHEN 7.5; 325 MG/1; MG/1
1 TABLET ORAL 3 TIMES DAILY PRN
Qty: 90 TABLET | Refills: 0 | Status: SHIPPED | OUTPATIENT
Start: 2024-06-03

## 2024-06-03 NOTE — TELEPHONE ENCOUNTER
----- Message from Festus Menjivar sent at 6/2/2024  8:39 PM EDT -----  Regarding: PAIN  Contact: [phone]  Jj Bangura,    I hope you had a good weekend.  You guys had asked to to call this week for a refill of the Norco 7.5.  Thank you in advance.

## 2024-06-04 ENCOUNTER — TREATMENT (OUTPATIENT)
Dept: PHYSICAL THERAPY | Facility: CLINIC | Age: 59
End: 2024-06-04
Payer: COMMERCIAL

## 2024-06-04 ENCOUNTER — OFFICE VISIT (OUTPATIENT)
Dept: ORTHOPEDIC SURGERY | Facility: CLINIC | Age: 59
End: 2024-06-04
Payer: COMMERCIAL

## 2024-06-04 DIAGNOSIS — Z96.652 AFTERCARE FOLLOWING LEFT KNEE JOINT REPLACEMENT SURGERY: Primary | ICD-10-CM

## 2024-06-04 DIAGNOSIS — Z47.1 AFTERCARE FOLLOWING LEFT KNEE JOINT REPLACEMENT SURGERY: Primary | ICD-10-CM

## 2024-06-04 DIAGNOSIS — G89.29 CHRONIC PAIN OF LEFT KNEE: ICD-10-CM

## 2024-06-04 DIAGNOSIS — M25.562 CHRONIC PAIN OF LEFT KNEE: ICD-10-CM

## 2024-06-04 DIAGNOSIS — Z96.652 S/P TOTAL KNEE ARTHROPLASTY, LEFT: ICD-10-CM

## 2024-06-04 DIAGNOSIS — Z96.652 AFTERCARE FOLLOWING LEFT KNEE JOINT REPLACEMENT SURGERY: ICD-10-CM

## 2024-06-04 DIAGNOSIS — Z47.1 AFTERCARE FOLLOWING LEFT KNEE JOINT REPLACEMENT SURGERY: ICD-10-CM

## 2024-06-04 PROCEDURE — 3008F BODY MASS INDEX DOCD: CPT | Performed by: ORTHOPAEDIC SURGERY

## 2024-06-04 PROCEDURE — 97140 MANUAL THERAPY 1/> REGIONS: CPT | Mod: GP | Performed by: PHYSICAL THERAPIST

## 2024-06-04 PROCEDURE — 97110 THERAPEUTIC EXERCISES: CPT | Mod: GP | Performed by: PHYSICAL THERAPIST

## 2024-06-04 PROCEDURE — 99024 POSTOP FOLLOW-UP VISIT: CPT | Performed by: ORTHOPAEDIC SURGERY

## 2024-06-04 RX ORDER — CYCLOBENZAPRINE HCL 10 MG
10 TABLET ORAL 3 TIMES DAILY PRN
Qty: 90 TABLET | Refills: 0 | Status: SHIPPED | OUTPATIENT
Start: 2024-06-04

## 2024-06-04 ASSESSMENT — PAIN - FUNCTIONAL ASSESSMENT: PAIN_FUNCTIONAL_ASSESSMENT: 0-10

## 2024-06-04 ASSESSMENT — PAIN SCALES - GENERAL: PAINLEVEL_OUTOF10: 4

## 2024-06-04 NOTE — LETTER
[facility]  [address] [place]  [facility] [address]  PHONE: [phone]  FAX: [phone]      June 4, 2024    Patient: Festus Menjivar   YOB: 1965   Date of Visit: 6/4/2024       To Whom It May Concern,    Festus Menjivar was seen in my clinic on 6/4/2024, she has been advised she may return to work on 6/10/2024, full duty, as tolerated.    If you have any questions or concerns, please contact the office by phone or fax.  Phone: [phone] or Fax: [phone]        Sincerely,      Darrion Murphy MD

## 2024-06-04 NOTE — PROGRESS NOTES
Physical Therapy    Physical Therapy Treatment    Patient Name: Festus Menjivar  MRN: [id number]  Today's Date: 6/4/2024  Time Calculation  Start Time: 1115  Stop Time: 1202  Time Calculation (min): 47 min     PT Therapeutic Procedures Time Entry  Manual Therapy Time Entry: 10  Therapeutic Exercise Time Entry: 37                   Current Problem  1. S/P total knee arthroplasty, left  Follow Up In Physical Therapy      2. Chronic pain of left knee  Follow Up In Physical Therapy      3. Aftercare following left knee joint replacement surgery  Follow Up In Physical Therapy        Problem List Items Addressed This Visit             ICD-10-CM    Chronic pain of left knee M25.562, G89.29    S/P total knee arthroplasty, left Z96.652     Other Visit Diagnoses         Codes    Aftercare following left knee joint replacement surgery     Z47.1, Z96.652            SUBJECTIVE  Subjective   General  Reason for Referral: s/p L TKA  Referred By: Dr. Murphy  Past Medical History Relevant to Rehab: Asthma  General Comment: 40 V limit combined with OT, all remain, no auth  Precautions  Precautions  Post-Surgical Precautions: Left total knee precautions  Precautions Comment: DOS 4/29/24       Pain  Pain Assessment: 0-10  Pain Score: 4  Pain Location: Knee  Pain Orientation: Left    Patient reports that she is doing well. Reports pain no greater than 4/10 on VAS. Reports that she was a bit sore the day following last session. States she has been focusing on her gait to avoid old habits of wobbling.   Patient reports compliance with HEP.     VISIT NUMBER: 6  EVAL 5/21/24   Re-Check visit 10   Auth not required      Treatments:  TherEx:  Bike 5 min   IB 2x1 min   HSS 2x30 seconds (L)   Step Knee Flexion Stretch 10/10, 3 min   Step Up 20x LLE NEW   Shuttle 5 bands, 2x10 (p, resistance)   SL Shuttle 4 bands, 2x10 (p, resistance)   Side Stepping Green, 2 laps (r, resistance for improved form and execution)   LLLD Knee Extension Stretch  with MHP 4 min 3 lbs  Heel Slides 3 min   Prone TKE 5/5, 2 min      Manual:  Myofascial Roll Out to (L) ITB, (L) HS, (L) GSS        Objective   Lower Extremity ROM:  Date:  5/21/24 6/4/24    RIGHT LEFT LEFT   Knee Extension NL -10 -5   Knee Flexion NL 85 105       Assessment:     Patient tolerates session well. Less pain with ITB roll out this date. Demos improving knee motion.     This session exercises were progressed (p) or added (NEW) as documented in treatment section.  Pt required minimal to moderate cues and demonstration to complete exercises with proper form and responded without adverse effects.        Plan:     Continue to progress treatment to address strength, motion, jt mobility, soft tissue mobility, and flexibility deficits impacting patient's return to PLOF unrestricted and symptom free.     Goals:  Active       PT Problem       LEFS 70+       Start:  05/21/24    Expected End:  07/16/24            Pt will demo improved AROM in L knee(s) >/= 120 FLEX and 0 EXT for improved ease with functional activities, mobility, and transfers for progression toward independence with ADL's & IADL's.         Start:  05/21/24    Expected End:  07/16/24            Pt will demo improved MMT to at least 4/5 on 0-5 point scale in BLE for improved strength and stability, and improved ease with transfers, lifting/carrying, and proper mechanics with ADL's & IADL's.         Start:  05/21/24    Expected End:  07/16/24            Patient will be able to tolerate continuous ambulation for at least 30+ minutes or greater without pain or limitation from prior level of function to improve safety and function with community mobility.         Start:  05/21/24    Expected End:  07/16/24            Patient will demonstrate improved standing tolerance to 60+ mins, independently, with good mechanics, and without assistance to allow for improved safety and function with ADLs, HHCs, and all functional mobility.          Start:  05/21/24     Expected End:  07/16/24            Pt will demo reciprocal gait pattern with ascent and descent of full flight of stairs with normal mechanics and without pain to allow for return to PLOF and safety with home and community mobility.         Start:  05/21/24    Expected End:  07/16/24

## 2024-06-04 NOTE — PROGRESS NOTES
Subjective    Patient ID: Festus    Chief Complaint:   Chief Complaint   Patient presents with    Left Knee - Follow-up     LT JATINDER TKR 4/29/24      History of present illness    58-year-old female presented clinic today for her 6-week postop visit status post left total knee Jatinder.  She is doing very well postoperative this time.  Ambulating without assistance.  She is very pleased with the outcome.  She states she has no pain today.  She is continuing to go through outpatient physical therapy and doing well with this.  She is very impressed with her range of motion at this time.  Still taking an occasional muscle relaxant and her hydrocodone for relief.      Past medical , Surgical, Family and social history reviewed.      Physical exam  General: No acute distress and breathing comfortably.  Patient is pleasant and cooperative with the examination.    Extremity  Left knee is neurovascular intact.  Range of motion 5 to 105 degrees.  No sign of infection.  Incision is well-healed over the midline incision.  Compartments soft.  Capillary refill within normal is distally.  No calf swelling or tenderness.  No DVT.  Improving strength.    Diagnostics  [ none]  XR knee left 3 views    Result Date: 5/15/2024  Interpreted By:  Darrion Murphy, STUDY: XR KNEE LEFT 3 VIEWS; 5/15/2024 2:28 pm   INDICATION: Signs/Symptoms:pain.   ACCESSION NUMBER(S): [id number]   ORDERING CLINICIAN: DARRION MURPHY   FINDINGS: Three views of the knee show status post joint replacement in good alignment.  There are no signs of fracture or dislocation.  No other bony abnormalities       Signed by: Darrion Murphy 5/15/2024 3:52 PM Dictation workstation:   [id number]       Procedure  [ none]    Assessment  Status post left total knee Jatinder    Treatment plan  1.  Wound instructions discussed today  2.  She was encouraged to continue with weightbearing activity as tolerated.  Prescription refill for cyclobenzaprine sent to the pharmacy.   We also gave her return to work dated 6/10/2024 full duty as tolerated as a   3.  She will follow with us in 6 weeks with new x-rays left knee at that time.  4.  All of the patient's questions were answered.    No orders of the defined types were placed in this encounter.      This note was prepared using voice recognition software.  The details of this note are correct and have been reviewed, and corrected to the best of my ability.  Some grammatical areas may persist related to the Dragon software    Carter Sainz PA-C, [facility]  [facility] [place]    [phone]

## 2024-06-07 ENCOUNTER — TREATMENT (OUTPATIENT)
Dept: PHYSICAL THERAPY | Facility: CLINIC | Age: 59
End: 2024-06-07
Payer: COMMERCIAL

## 2024-06-07 ENCOUNTER — TELEPHONE (OUTPATIENT)
Dept: PRIMARY CARE | Facility: CLINIC | Age: 59
End: 2024-06-07

## 2024-06-07 DIAGNOSIS — M25.562 CHRONIC PAIN OF LEFT KNEE: ICD-10-CM

## 2024-06-07 DIAGNOSIS — Z96.652 S/P TOTAL KNEE ARTHROPLASTY, LEFT: ICD-10-CM

## 2024-06-07 DIAGNOSIS — Z96.652 AFTERCARE FOLLOWING LEFT KNEE JOINT REPLACEMENT SURGERY: ICD-10-CM

## 2024-06-07 DIAGNOSIS — Z47.1 AFTERCARE FOLLOWING LEFT KNEE JOINT REPLACEMENT SURGERY: ICD-10-CM

## 2024-06-07 DIAGNOSIS — G89.29 CHRONIC PAIN OF LEFT KNEE: ICD-10-CM

## 2024-06-07 PROCEDURE — 97140 MANUAL THERAPY 1/> REGIONS: CPT | Mod: GP | Performed by: PHYSICAL THERAPIST

## 2024-06-07 PROCEDURE — 97110 THERAPEUTIC EXERCISES: CPT | Mod: GP | Performed by: PHYSICAL THERAPIST

## 2024-06-07 ASSESSMENT — PAIN - FUNCTIONAL ASSESSMENT: PAIN_FUNCTIONAL_ASSESSMENT: 0-10

## 2024-06-07 ASSESSMENT — PAIN SCALES - GENERAL: PAINLEVEL_OUTOF10: 3

## 2024-06-07 NOTE — PROGRESS NOTES
"Physical Therapy    Physical Therapy Treatment    Patient Name: Festus Menjivar  MRN: [id number]  Today's Date: 6/7/2024  Time Calculation  Start Time: 1403  Stop Time: 1447  Time Calculation (min): 44 min     PT Therapeutic Procedures Time Entry  Manual Therapy Time Entry: 10  Therapeutic Exercise Time Entry: 34                   Current Problem  1. S/P total knee arthroplasty, left  Follow Up In Physical Therapy      2. Chronic pain of left knee  Follow Up In Physical Therapy      3. Aftercare following left knee joint replacement surgery  Follow Up In Physical Therapy        Problem List Items Addressed This Visit             ICD-10-CM    Chronic pain of left knee M25.562, G89.29    S/P total knee arthroplasty, left Z96.652     Other Visit Diagnoses         Codes    Aftercare following left knee joint replacement surgery     Z47.1, Z96.652            SUBJECTIVE  Subjective   General  Reason for Referral: s/p L TKA  Referred By: Dr. Murphy  Past Medical History Relevant to Rehab: Asthma  General Comment: 40 V limit combined with OT, all remain, no auth  Precautions  Precautions  Post-Surgical Precautions: Left total knee precautions  Precautions Comment: DOS 4/29/24       Pain  Pain Assessment: 0-10  Pain Score: 3  Pain Location: Knee  Pain Orientation: Left    Patient reports that she is doing well. States she had MD follow up and he is pleased with her progress thus far. States she feels she isn't \"wobbling\" as much while walking.   Patient reports compliance with HEP.     VISIT NUMBER: 7  EVAL 5/21/24   Re-Check visit 10   Auth not required      Treatments:  TherEx:  Bike 5 min   IB 2x1 min   HSS 2x30 seconds (L)   Step Knee Flexion Stretch 10/10, 3 min   Step Up 4 in, 20x LLE   Shuttle 6 bands, 3x10 (p, resistance)   SL Shuttle 4 bands, 2x10   Side Stepping Green, 2 laps   LLLD Knee Extension Stretch with MHP 4 min 3 lbs  Heel Slides 3 min   Prone TKE 5/5, 2 min      Manual:  Myofascial Roll Out to (L) ITB, " (L) HS, (L) GSS        Objective   No objective measures assessed this visit     Assessment:     Patient tolerates session well. Demos improving myofascial mobility throughout LLE. Motion and strength improving steadily. Progressing well.     This session exercises were progressed (p) or added (NEW) as documented in treatment section.  Pt required minimal to moderate cues and demonstration to complete exercises with proper form and responded without adverse effects.        Plan:     Continue to progress treatment to address strength, motion, jt mobility, soft tissue mobility, and flexibility deficits impacting patient's return to PLOF unrestricted and symptom free.     Goals:  Active       PT Problem       LEFS 70+       Start:  05/21/24    Expected End:  07/16/24            Pt will demo improved AROM in L knee(s) >/= 120 FLEX and 0 EXT for improved ease with functional activities, mobility, and transfers for progression toward independence with ADL's & IADL's.         Start:  05/21/24    Expected End:  07/16/24            Pt will demo improved MMT to at least 4/5 on 0-5 point scale in BLE for improved strength and stability, and improved ease with transfers, lifting/carrying, and proper mechanics with ADL's & IADL's.         Start:  05/21/24    Expected End:  07/16/24            Patient will be able to tolerate continuous ambulation for at least 30+ minutes or greater without pain or limitation from prior level of function to improve safety and function with community mobility.         Start:  05/21/24    Expected End:  07/16/24            Patient will demonstrate improved standing tolerance to 60+ mins, independently, with good mechanics, and without assistance to allow for improved safety and function with ADLs, HHCs, and all functional mobility.          Start:  05/21/24    Expected End:  07/16/24            Pt will demo reciprocal gait pattern with ascent and descent of full flight of stairs with normal  mechanics and without pain to allow for return to PLOF and safety with home and community mobility.         Start:  05/21/24    Expected End:  07/16/24

## 2024-06-07 NOTE — TELEPHONE ENCOUNTER
PATIENT CALLED - SHE WANTED TO KNOW IF THERE WAS ANYTHING WE COULD DO TO HELP GET THIS MEDICAL RECORDS.    WE DID CHECK - WE DID RECEIVE A REQUEST FOR MEDICAL RECORDS, BUT ON 5/21/24, WE SENT IT BACK TO [facility], TELLING THEM THAT WE NEEDED A ACTUAL SIGNATURE NOT AN ELECTRONIC ONE - NEVER HEARD ANYTHING BACK FROM THEM.  ERON DID CHECK WITH O, THEY NEVER RECEIVED ANY MEDICAL RECORDS RELEASES FROM US.  NOT SURE WHERE THE $107   DOLLARS IS COMING FROM? MAYBE DR. GANDHI OFFICE?    BUT PER ERON, WE CAN MAKE COPIES OF THE 3 OFFICE VISIT NOTES AND FAX THEM OVER TO [facility].    OM FOR PATIENT TO RETURN OUR CALL     I HAVE ALL COPIED MEDICAL RECORDS AT MY DESK.

## 2024-06-07 NOTE — TELEPHONE ENCOUNTER
----- Message from Willem Espana MA sent at 6/7/2024 12:21 PM EDT -----  Regarding: FW: Medical records  Contact: [phone]    ----- Message -----  From: Festus Menjivar  Sent: 6/7/2024  12:08 PM EDT  To: #  Subject: Medical records                                  Is this fee standard when medical records are requested?  This $107 is holding up my disability payments.  I  am at a loss at this point.   My last email I included a fax number for records to be sent.  I pray you can help me.  I haven’t seen a disability paycheck in almost 2 months.  Thank you.  Fax number #[phone]

## 2024-06-07 NOTE — TELEPHONE ENCOUNTER
Patient returned call - she is aware that we will fax over her medical records to [facility].

## 2024-06-10 ENCOUNTER — APPOINTMENT (OUTPATIENT)
Dept: PHYSICAL THERAPY | Facility: CLINIC | Age: 59
End: 2024-06-10
Payer: COMMERCIAL

## 2024-06-12 ENCOUNTER — TREATMENT (OUTPATIENT)
Dept: PHYSICAL THERAPY | Facility: CLINIC | Age: 59
End: 2024-06-12
Payer: COMMERCIAL

## 2024-06-12 DIAGNOSIS — Z47.1 AFTERCARE FOLLOWING LEFT KNEE JOINT REPLACEMENT SURGERY: ICD-10-CM

## 2024-06-12 DIAGNOSIS — Z96.652 S/P TOTAL KNEE ARTHROPLASTY, LEFT: ICD-10-CM

## 2024-06-12 DIAGNOSIS — M25.562 CHRONIC PAIN OF LEFT KNEE: ICD-10-CM

## 2024-06-12 DIAGNOSIS — G89.29 CHRONIC PAIN OF LEFT KNEE: ICD-10-CM

## 2024-06-12 DIAGNOSIS — Z96.652 AFTERCARE FOLLOWING LEFT KNEE JOINT REPLACEMENT SURGERY: ICD-10-CM

## 2024-06-12 PROCEDURE — 97110 THERAPEUTIC EXERCISES: CPT | Mod: GP | Performed by: PHYSICAL THERAPIST

## 2024-06-12 PROCEDURE — 97140 MANUAL THERAPY 1/> REGIONS: CPT | Mod: GP | Performed by: PHYSICAL THERAPIST

## 2024-06-12 RX ORDER — TRAMADOL HYDROCHLORIDE 50 MG/1
50 TABLET ORAL EVERY 12 HOURS PRN
Qty: 14 TABLET | Refills: 0 | Status: SHIPPED | OUTPATIENT
Start: 2024-06-12 | End: 2024-06-19

## 2024-06-12 ASSESSMENT — PAIN - FUNCTIONAL ASSESSMENT: PAIN_FUNCTIONAL_ASSESSMENT: 0-10

## 2024-06-12 ASSESSMENT — PAIN SCALES - GENERAL: PAINLEVEL_OUTOF10: 4

## 2024-06-12 NOTE — PROGRESS NOTES
Physical Therapy    Physical Therapy Treatment    Patient Name: Festus Menjivar  MRN: [id number]  Today's Date: 6/12/2024  Time Calculation  Start Time: 1313  Stop Time: 1400  Time Calculation (min): 47 min     PT Therapeutic Procedures Time Entry  Manual Therapy Time Entry: 10  Therapeutic Exercise Time Entry: 37                   Current Problem  1. S/P total knee arthroplasty, left  Follow Up In Physical Therapy      2. Chronic pain of left knee  Follow Up In Physical Therapy      3. Aftercare following left knee joint replacement surgery  Follow Up In Physical Therapy        Problem List Items Addressed This Visit             ICD-10-CM    Chronic pain of left knee M25.562, G89.29    S/P total knee arthroplasty, left Z96.652     Other Visit Diagnoses         Codes    Aftercare following left knee joint replacement surgery     Z47.1, Z96.652            SUBJECTIVE  Subjective   General  Reason for Referral: s/p L TKA  Referred By: Dr. Murphy  Past Medical History Relevant to Rehab: Asthma  General Comment: 40 V limit combined with OT, all remain, no auth  Precautions  Precautions  Post-Surgical Precautions: Left total knee precautions  Precautions Comment: DOS 4/29/24       Pain  Pain Assessment: 0-10  Pain Score: 4  Pain Location: Knee  Pain Orientation: Left    Patient reports that she went back to work and has had more swelling and soreness since.   Patient reports compliance with HEP.     VISIT NUMBER: 8  EVAL 5/21/24   Re-Check visit 10   Auth not required      Treatments:  TherEx:  Bike 5 min   IB 2x1 min   HSS 2x30 seconds (L)   Step Knee Flexion Stretch 10/10, 3 min   Step Up 4 in, 20x LLE   Shuttle 6 bands, 3x10  SL Shuttle 4 bands, 3x10 (p, sets)  Side Stepping Green, 2 laps   SL Quad Ext 11 lbs, 2x10 (B) NEW   HSC 11 lbs, 2x10 NEW   LLLD Knee Extension Stretch with MHP 3 min 3 lbs  Prone Hang 3 lbs, 3 min with MHP NEW   Quad Stretch 3x30 seconds NEW   Prone TKE 5/5, 2 min      Manual:  Myofascial Roll  Out to (L) ITB, (L) HS, (L) GSS        Objective   No objective measures assessed this visit       Assessment:     Patient demos improved knee motion this date. Able to complete full revolution on standard bike this date. Patient is progressing well through rehab.     This session exercises were progressed (p) or added (NEW) as documented in treatment section.  Pt required minimal to moderate cues and demonstration to complete exercises with proper form and responded without adverse effects.        Plan:     Continue to progress treatment to address strength, motion, jt mobility, soft tissue mobility, and flexibility deficits impacting patient's return to PLOF unrestricted and symptom free.     Goals:  Active       PT Problem       LEFS 70+       Start:  05/21/24    Expected End:  07/16/24            Pt will demo improved AROM in L knee(s) >/= 120 FLEX and 0 EXT for improved ease with functional activities, mobility, and transfers for progression toward independence with ADL's & IADL's.         Start:  05/21/24    Expected End:  07/16/24            Pt will demo improved MMT to at least 4/5 on 0-5 point scale in BLE for improved strength and stability, and improved ease with transfers, lifting/carrying, and proper mechanics with ADL's & IADL's.         Start:  05/21/24    Expected End:  07/16/24            Patient will be able to tolerate continuous ambulation for at least 30+ minutes or greater without pain or limitation from prior level of function to improve safety and function with community mobility.         Start:  05/21/24    Expected End:  07/16/24            Patient will demonstrate improved standing tolerance to 60+ mins, independently, with good mechanics, and without assistance to allow for improved safety and function with ADLs, HHCs, and all functional mobility.          Start:  05/21/24    Expected End:  07/16/24            Pt will demo reciprocal gait pattern with ascent and descent of full flight of  stairs with normal mechanics and without pain to allow for return to PLOF and safety with home and community mobility.         Start:  05/21/24    Expected End:  07/16/24

## 2024-06-14 ENCOUNTER — TREATMENT (OUTPATIENT)
Dept: PHYSICAL THERAPY | Facility: CLINIC | Age: 59
End: 2024-06-14
Payer: COMMERCIAL

## 2024-06-14 DIAGNOSIS — F41.9 ANXIETY: ICD-10-CM

## 2024-06-14 DIAGNOSIS — Z96.652 S/P TOTAL KNEE ARTHROPLASTY, LEFT: ICD-10-CM

## 2024-06-14 DIAGNOSIS — Z47.1 AFTERCARE FOLLOWING LEFT KNEE JOINT REPLACEMENT SURGERY: ICD-10-CM

## 2024-06-14 DIAGNOSIS — M25.562 CHRONIC PAIN OF LEFT KNEE: ICD-10-CM

## 2024-06-14 DIAGNOSIS — G89.29 CHRONIC PAIN OF LEFT KNEE: ICD-10-CM

## 2024-06-14 DIAGNOSIS — Z96.652 AFTERCARE FOLLOWING LEFT KNEE JOINT REPLACEMENT SURGERY: ICD-10-CM

## 2024-06-14 PROCEDURE — 97110 THERAPEUTIC EXERCISES: CPT | Mod: GP | Performed by: PHYSICAL THERAPIST

## 2024-06-14 RX ORDER — DIAZEPAM 5 MG/1
5 TABLET ORAL 2 TIMES DAILY PRN
Qty: 60 TABLET | Refills: 0 | Status: SHIPPED | OUTPATIENT
Start: 2024-06-14 | End: 2024-07-14

## 2024-06-14 ASSESSMENT — PAIN SCALES - GENERAL: PAINLEVEL_OUTOF10: 6

## 2024-06-14 ASSESSMENT — PAIN - FUNCTIONAL ASSESSMENT: PAIN_FUNCTIONAL_ASSESSMENT: 0-10

## 2024-06-14 NOTE — PROGRESS NOTES
Physical Therapy    Physical Therapy Treatment    Patient Name: Festus Menjivar  MRN: [id number]  Today's Date: 6/14/2024  Time Calculation  Start Time: 1401  Stop Time: 1448  Time Calculation (min): 47 min     PT Therapeutic Procedures Time Entry  Manual Therapy Time Entry: 8  Therapeutic Exercise Time Entry: 39                   Current Problem  1. S/P total knee arthroplasty, left  Follow Up In Physical Therapy      2. Chronic pain of left knee  Follow Up In Physical Therapy      3. Aftercare following left knee joint replacement surgery  Follow Up In Physical Therapy        Problem List Items Addressed This Visit             ICD-10-CM    Chronic pain of left knee M25.562, G89.29    S/P total knee arthroplasty, left Z96.652     Other Visit Diagnoses         Codes    Aftercare following left knee joint replacement surgery     Z47.1, Z96.652            SUBJECTIVE  Subjective   General  Reason for Referral: s/p L TKA  Referred By: Dr. Murphy  Past Medical History Relevant to Rehab: Asthma  General Comment: 40 V limit combined with OT, all remain, no auth  Precautions  Precautions  Post-Surgical Precautions: Left total knee precautions  Precautions Comment: DOS 4/29/24       Pain  Pain Assessment: 0-10  Pain Score: 6  Pain Location: Knee  Pain Orientation: Left    Patient reports that her knee is a bit more sore than before which she attributes to returning to work.   Patient reports compliance with HEP.     VISIT NUMBER: 9  EVAL 5/21/24   Re-Check visit 10   Auth not required      Treatments:  TherEx:  Bike 5 min   IB 2x1 min   HSS 2x30 seconds (L)   Step Knee Flexion Stretch 10/10, 3 min   Shuttle 6 bands, 3x10  SL Shuttle 4 bands, 3x10   Step Ups 6 in, 2x10 (B) (p, step height)  Side Stepping Green, 2 laps   SL Quad Ext 11 lbs, 2x10 (B)   HSC 11 lbs, 2x10   LLLD Knee Extension Stretch with MHP 3 min 3 lbs  Prone Hang 3 lbs, 3 min with MHP   Quad Stretch 3x30 seconds   Prone TKE 5/5, 2 min       Manual:  Myofascial Roll Out to (L) ITB, (L) HS, (L) GSS        Objective   No objective measures assessed this visit       Assessment:     Patient tolerates session well. Demos improving motion, strength, and functional mobility. Progressing steadily. Demos good and steady progress.     This session exercises were progressed (p) or added (NEW) as documented in treatment section.  Pt required minimal to moderate cues and demonstration to complete exercises with proper form and responded without adverse effects.        Plan:     Continue to progress treatment to address strength, motion, jt mobility, soft tissue mobility, and flexibility deficits impacting patient's return to PLOF unrestricted and symptom free.     Goals:  Active       PT Problem       LEFS 70+       Start:  05/21/24    Expected End:  07/16/24            Pt will demo improved AROM in L knee(s) >/= 120 FLEX and 0 EXT for improved ease with functional activities, mobility, and transfers for progression toward independence with ADL's & IADL's.         Start:  05/21/24    Expected End:  07/16/24            Pt will demo improved MMT to at least 4/5 on 0-5 point scale in BLE for improved strength and stability, and improved ease with transfers, lifting/carrying, and proper mechanics with ADL's & IADL's.         Start:  05/21/24    Expected End:  07/16/24            Patient will be able to tolerate continuous ambulation for at least 30+ minutes or greater without pain or limitation from prior level of function to improve safety and function with community mobility.         Start:  05/21/24    Expected End:  07/16/24            Patient will demonstrate improved standing tolerance to 60+ mins, independently, with good mechanics, and without assistance to allow for improved safety and function with ADLs, HHCs, and all functional mobility.          Start:  05/21/24    Expected End:  07/16/24            Pt will demo reciprocal gait pattern with ascent and  descent of full flight of stairs with normal mechanics and without pain to allow for return to PLOF and safety with home and community mobility.         Start:  05/21/24    Expected End:  07/16/24

## 2024-06-14 NOTE — TELEPHONE ENCOUNTER
----- Message from Festus Menjivar sent at 6/13/2024  8:30 PM EDT -----  Regarding: Diazepam   Contact: [phone]  Good morning,    Can I please get a refill on this medication?

## 2024-06-19 ENCOUNTER — TREATMENT (OUTPATIENT)
Dept: PHYSICAL THERAPY | Facility: CLINIC | Age: 59
End: 2024-06-19
Payer: COMMERCIAL

## 2024-06-19 DIAGNOSIS — Z96.652 S/P TOTAL KNEE ARTHROPLASTY, LEFT: ICD-10-CM

## 2024-06-19 DIAGNOSIS — G89.29 CHRONIC PAIN OF LEFT KNEE: ICD-10-CM

## 2024-06-19 DIAGNOSIS — Z96.652 AFTERCARE FOLLOWING LEFT KNEE JOINT REPLACEMENT SURGERY: ICD-10-CM

## 2024-06-19 DIAGNOSIS — M25.562 CHRONIC PAIN OF LEFT KNEE: ICD-10-CM

## 2024-06-19 DIAGNOSIS — Z47.1 AFTERCARE FOLLOWING LEFT KNEE JOINT REPLACEMENT SURGERY: ICD-10-CM

## 2024-06-19 PROCEDURE — 97140 MANUAL THERAPY 1/> REGIONS: CPT | Mod: GP | Performed by: PHYSICAL THERAPIST

## 2024-06-19 PROCEDURE — 97110 THERAPEUTIC EXERCISES: CPT | Mod: GP | Performed by: PHYSICAL THERAPIST

## 2024-06-19 ASSESSMENT — PAIN - FUNCTIONAL ASSESSMENT: PAIN_FUNCTIONAL_ASSESSMENT: 0-10

## 2024-06-19 ASSESSMENT — PAIN SCALES - GENERAL: PAINLEVEL_OUTOF10: 5 - MODERATE PAIN

## 2024-06-19 NOTE — PROGRESS NOTES
"Physical Therapy    Physical Therapy Treatment    Patient Name: Festus Menjivar  MRN: [id number]  : 1965   Darrion Murphy  Today's Date: 2024  Time Calculation  Start Time: 1445  Stop Time: 1530  Time Calculation (min): 45 min  PT Therapeutic Procedures Time Entry  Manual Therapy Time Entry: 10  Therapeutic Exercise Time Entry: 35           General  Reason for Referral: s/p L TKA  Referred By: Dr. Murphy  Past Medical History Relevant to Rehab: Asthma  General Comment: 40 V limit combined with OT, all remain, no auth  Visit #10     Current Problem  Problem List Items Addressed This Visit             ICD-10-CM    Chronic pain of left knee M25.562, G89.29    S/P total knee arthroplasty, left Z96.652     Other Visit Diagnoses         Codes    Aftercare following left knee joint replacement surgery     Z47.1, Z96.652                 Subjective   Pt. Reports that the last two nighs she has been experienced a \"locking\" sensation when moving the knee from flexion to extension when she wakes up    Pt. Reports compliance with HEP.    Precautions  Precautions  Post-Surgical Precautions: Left total knee precautions  Precautions Comment: DOS 24    Pain  Pain Assessment: 0-10  0-10 (Numeric) Pain Score: 5 - Moderate pain  Pain Location: Knee  Pain Orientation: Left    Objective   Left knee AROM  0-105*  PROM   0-115*   Pt's name and  confirmed today        Treatments:    TherEx:  Bike 5 min   IB 2x1 min   HSS 2x30 seconds (L)   Step Knee Flexion Stretch 10/10, 3 min   Shuttle 6 bands, 3x10    Step Ups 6 in, 2x10   Side Stepping Green, 2 laps   SL Quad Ext 11 lbs, 2x10 (B)   HSC 11 lbs, 2x10   LLLD Knee Extension Stretch with MHP 3 min 3 lbs  Prone Hang 3 lbs, 3 min with MHP   Quad Stretch 3x30 seconds   Quad sets with heel propped 5 sec x10     Manual:  Left knee flexion stretching 10 min    Assessment:   Single leg press was held today d/t pt. Reporting a sensation of \"locking\".  This therapist moved " "the knee from flexion into extension passively at this point and no physical end feel was noted during the sensation of \"locking\".  Her locking sensation appears to be related to pain only   Significant improvement inROM    Plan:  OP PT Plan  Treatment/Interventions: Cryotherapy, Education/ Instruction, Electrical stimulation, Gait training, Hot pack, Manual therapy, Neuromuscular re-education, Taping techniques, Therapeutic activities, Therapeutic exercises, Vasopneumatic device  PT Plan: Skilled PT  PT Frequency: 2 times per week  Duration: 8 weeks  Rehab Potential: Good  Plan of Care Agreement: Patient    Goals:  Active       PT Problem       LEFS 70+       Start:  05/21/24    Expected End:  07/16/24            Pt will demo improved AROM in L knee(s) >/= 120 FLEX and 0 EXT for improved ease with functional activities, mobility, and transfers for progression toward independence with ADL's & IADL's.   (Progressing)       Start:  05/21/24    Expected End:  07/16/24            Pt will demo improved MMT to at least 4/5 on 0-5 point scale in BLE for improved strength and stability, and improved ease with transfers, lifting/carrying, and proper mechanics with ADL's & IADL's.         Start:  05/21/24    Expected End:  07/16/24            Patient will be able to tolerate continuous ambulation for at least 30+ minutes or greater without pain or limitation from prior level of function to improve safety and function with community mobility.         Start:  05/21/24    Expected End:  07/16/24            Patient will demonstrate improved standing tolerance to 60+ mins, independently, with good mechanics, and without assistance to allow for improved safety and function with ADLs, HHCs, and all functional mobility.          Start:  05/21/24    Expected End:  07/16/24            Pt will demo reciprocal gait pattern with ascent and descent of full flight of stairs with normal mechanics and without pain to allow for return to PLOF " and safety with home and community mobility.         Start:  05/21/24    Expected End:  07/16/24

## 2024-06-20 ENCOUNTER — TELEPHONE (OUTPATIENT)
Dept: ORTHOPEDIC SURGERY | Facility: CLINIC | Age: 59
End: 2024-06-20
Payer: COMMERCIAL

## 2024-06-20 DIAGNOSIS — Z47.1 AFTERCARE FOLLOWING LEFT KNEE JOINT REPLACEMENT SURGERY: ICD-10-CM

## 2024-06-20 DIAGNOSIS — Z96.652 AFTERCARE FOLLOWING LEFT KNEE JOINT REPLACEMENT SURGERY: ICD-10-CM

## 2024-06-20 NOTE — TELEPHONE ENCOUNTER
Patient is out of the 6 weeks post-op period, will discuss with Dr. Murphy when he returns to the office tomorrow morning.

## 2024-06-21 ENCOUNTER — APPOINTMENT (OUTPATIENT)
Dept: PHYSICAL THERAPY | Facility: CLINIC | Age: 59
End: 2024-06-21
Payer: COMMERCIAL

## 2024-06-21 RX ORDER — TRAMADOL HYDROCHLORIDE 50 MG/1
50 TABLET ORAL EVERY 12 HOURS PRN
Qty: 14 TABLET | Refills: 0 | Status: SHIPPED | OUTPATIENT
Start: 2024-06-21 | End: 2024-06-28

## 2024-06-24 ENCOUNTER — TREATMENT (OUTPATIENT)
Dept: PHYSICAL THERAPY | Facility: CLINIC | Age: 59
End: 2024-06-24
Payer: COMMERCIAL

## 2024-06-24 DIAGNOSIS — Z96.652 S/P TOTAL KNEE ARTHROPLASTY, LEFT: ICD-10-CM

## 2024-06-24 DIAGNOSIS — Z96.652 AFTERCARE FOLLOWING LEFT KNEE JOINT REPLACEMENT SURGERY: ICD-10-CM

## 2024-06-24 DIAGNOSIS — M25.562 CHRONIC PAIN OF LEFT KNEE: ICD-10-CM

## 2024-06-24 DIAGNOSIS — G89.29 CHRONIC PAIN OF LEFT KNEE: ICD-10-CM

## 2024-06-24 DIAGNOSIS — Z47.1 AFTERCARE FOLLOWING LEFT KNEE JOINT REPLACEMENT SURGERY: ICD-10-CM

## 2024-06-24 PROCEDURE — 97110 THERAPEUTIC EXERCISES: CPT | Mod: GP | Performed by: PHYSICAL THERAPIST

## 2024-06-24 ASSESSMENT — PAIN - FUNCTIONAL ASSESSMENT: PAIN_FUNCTIONAL_ASSESSMENT: 0-10

## 2024-06-24 ASSESSMENT — PAIN SCALES - GENERAL: PAINLEVEL_OUTOF10: 4

## 2024-06-24 NOTE — PROGRESS NOTES
Physical Therapy    Physical Therapy Treatment    Patient Name: Festus Menjivar  MRN: [id number]  Today's Date: 6/24/2024  Time Calculation  Start Time: 1450  Stop Time: 1532  Time Calculation (min): 42 min     PT Therapeutic Procedures Time Entry  Therapeutic Exercise Time Entry: 42                   Current Problem  1. S/P total knee arthroplasty, left  Follow Up In Physical Therapy      2. Chronic pain of left knee  Follow Up In Physical Therapy      3. Aftercare following left knee joint replacement surgery  Follow Up In Physical Therapy        Problem List Items Addressed This Visit             ICD-10-CM    Chronic pain of left knee M25.562, G89.29    S/P total knee arthroplasty, left Z96.652     Other Visit Diagnoses         Codes    Aftercare following left knee joint replacement surgery     Z47.1, Z96.652            SUBJECTIVE  Subjective   General  Reason for Referral: s/p L TKA  Referred By: Dr. Murphy  Past Medical History Relevant to Rehab: Asthma  General Comment: 40 V limit combined with OT, all remain, no auth  Precautions  Precautions  Post-Surgical Precautions: Left total knee precautions  Precautions Comment: DOS 4/29/24       Pain  Pain Assessment: 0-10  0-10 (Numeric) Pain Score: 4  Pain Location: Knee  Pain Orientation: Left    Patient reports that locking in her knee is getting better. States that she has been working on her knee and motion.   Patient reports compliance with HEP.     VISIT NUMBER: 11  EVAL 5/21/24   Re-Check visit 10   Auth not required      Treatments:  TherEx:  Bike 5 min   IB 2x1 min   HSS 2x30 seconds (L)   Step Knee Flexion Stretch 10/10, 2 min   Shuttle 6 bands, 3x10  SL Shuttle 4 bands, 3x10   Step Ups 6 in, 2x10 (B)   Step Downs 4 in ADD NV   Side Stepping Green, 2 laps   Quad Ext 11 lbs, 2x10 (B)   HSC 22 lbs, 2x10 (p, resistance)   LLLD Knee Extension Stretch with MHP 2 min 4 lbs (p, weight)   Prone Hang 4 lbs, 2 min with MHP (p, weight)   Quad Stretch 3x30 seconds    Prone TKE 5/5, 2 min HELD     Manual:  Myofascial Roll Out to (L) ITB, (L) HS, (L) GSS HELD       Objective     Assessment:       Patient tolerates session well. Able to progress interventions this date without pain. Motion is much improved. Plan to progress nv as tolerated to improve CKC strength and functional mobility.     This session exercises were progressed (p) or added (NEW) as documented in treatment section.  Pt required minimal to moderate cues and demonstration to complete exercises with proper form and responded without adverse effects.        Plan:     Continue to progress treatment to address strength, motion, jt mobility, soft tissue mobility, and flexibility deficits impacting patient's return to PLOF unrestricted and symptom free.     Goals:  Active       PT Problem       LEFS 70+       Start:  05/21/24    Expected End:  07/16/24            Pt will demo improved AROM in L knee(s) >/= 120 FLEX and 0 EXT for improved ease with functional activities, mobility, and transfers for progression toward independence with ADL's & IADL's.   (Progressing)       Start:  05/21/24    Expected End:  07/16/24            Pt will demo improved MMT to at least 4/5 on 0-5 point scale in BLE for improved strength and stability, and improved ease with transfers, lifting/carrying, and proper mechanics with ADL's & IADL's.         Start:  05/21/24    Expected End:  07/16/24            Patient will be able to tolerate continuous ambulation for at least 30+ minutes or greater without pain or limitation from prior level of function to improve safety and function with community mobility.         Start:  05/21/24    Expected End:  07/16/24            Patient will demonstrate improved standing tolerance to 60+ mins, independently, with good mechanics, and without assistance to allow for improved safety and function with ADLs, HHCs, and all functional mobility.          Start:  05/21/24    Expected End:  07/16/24            Pt  will demo reciprocal gait pattern with ascent and descent of full flight of stairs with normal mechanics and without pain to allow for return to PLOF and safety with home and community mobility.         Start:  05/21/24    Expected End:  07/16/24

## 2024-06-25 ENCOUNTER — TELEPHONE (OUTPATIENT)
Dept: PRIMARY CARE | Facility: CLINIC | Age: 59
End: 2024-06-25
Payer: COMMERCIAL

## 2024-06-25 NOTE — TELEPHONE ENCOUNTER
Patient aware that Dr. Hancock is not going to fill Norco prescription for her at this time. Patient aware that if she has any other questions about this medication that she is to make an appointment with Dr. Hancock.

## 2024-06-25 NOTE — TELEPHONE ENCOUNTER
----- Message from Willem Espana MA sent at 6/24/2024  2:36 PM EDT -----  Regarding: FW: Norco lower dose   Contact: [phone]    ----- Message -----  From: Festus Menjivar  Sent: 6/24/2024  12:28 PM EDT  To: #  Subject: Norco lower dose                                 Good morning.  Would Dr. Hancock be willing to fill this one last time for me?  They can be the 5 and not 7.5  I  just wanted to have some on hand as there are certain times my whole body aches especially my back which I am told is normal due to me now walking without the limp.  This will be the last time and I would only ask for the lower dose.  Thank you.

## 2024-06-26 ENCOUNTER — APPOINTMENT (OUTPATIENT)
Dept: PHYSICAL THERAPY | Facility: CLINIC | Age: 59
End: 2024-06-26
Payer: COMMERCIAL

## 2024-06-26 NOTE — TELEPHONE ENCOUNTER
Talked to patient on the phone. Patient will be calling back tomorrow to make an appointment with Dr. Hancock to talk about pain medication.

## 2024-06-26 NOTE — TELEPHONE ENCOUNTER
OM FOR PATIENT TO CALL THE OFFICE BACK. PLEASE TRANSFER TO [facility] WHEN PATIENT CALLS BACK.         Festus LOUISE Do [id number] [facility]1 Clinical Support Staff (supporting Uvaldo Hancock MD)14 minutes ago (3:41 PM)       Tiara,  was Dr. Hancock still willing to refill the lower dose of Norco I last had it filled I believe on the 3rd.  I also am running low on the muscle relaxers.  I did find my missing bottle of 10 pills yesterday.  I’m not asking for an early refill, just when it’s due.  Again I apologize for all the confusion.  I was on oxycotin and hated it.  So my meds were changed.  Tramadol makes me feel very anxious for some odd reason.  I was just looking for one more refill to have on hand for the rough days,  walking the way I should and trying not to limp at all is throwing everything off, even my back.  I am doing good at PT.  I go there through July.  I apologize for all this confusion.  I have never abused my medication, I just did not read the label on a prescription I have taken in the past which was 1 every 6hrs and this last time it was one every 8.  Entirely my fault, but an honest error.  Thanks you guys!  I finally got the knee done and all of you have been very good to me!  I hope  can see my X-rays and how bad I was and how great it looks now.  You will be happy to get rid of me, at least no more 3 month visits for pain meds!         Willem Espana MA routed conversation to You4 hours ago (11:09 AM)     Festus LOUISE Do [id number] [facility] Clinical Support Staff (supporting Uvaldo Hancock MD)5 hours ago (10:40 AM)       Hi, I also wanted to mention the pain medication thing looking so weird, I know I need to get off the opioids,,so why my scatter brain thought to try and do that during this surgery and recovery made it look very confusing, I should have gotten through the surgery and recovery then stopped taking them.  I hated the Oxycotin and I did ask  for something different.  That just messed with my head. I just wanted to explain this.  Thanks        Willem Espana MA routed conversation to You6 hours ago (9:20 AM)     Festus [id number] [place] [address] Clinical Support Staff (supporting Uvaldo Hancock MD)21 hours ago (6:36 PM)       Martinez Callejas, I found the lost bottle of 10.  Was Dr. Hancock willing to refill this one last time?  I promise I will read the directions this time.  I can hold off a few days as it’s a few days early due to the fact I read the directions incorrectly.  I have never done that before.  Thanks!       You routed conversation to You; Renea Talbert, MAYesterday (12:51 PM)     Festus [id number] [place] [address] Clinical Support Staff (supporting Uvaldo Hancock MD)Yesterday (12:30 PM)       Jj Callejas, I called the office and they gave me your message about needing to see the doc.  That’s pretty hard for me right now as I just started back at work and need to leave early for my PT appointments.  I  work in [place] and live in [place].  Thank you for your time, I just do not see how I can get there anytime soon, unless we did a virtual or regular call.  I appreciate you taking the time to call me.

## 2024-07-01 ENCOUNTER — TREATMENT (OUTPATIENT)
Dept: PHYSICAL THERAPY | Facility: CLINIC | Age: 59
End: 2024-07-01
Payer: COMMERCIAL

## 2024-07-01 DIAGNOSIS — Z96.652 AFTERCARE FOLLOWING LEFT KNEE JOINT REPLACEMENT SURGERY: ICD-10-CM

## 2024-07-01 DIAGNOSIS — Z96.652 S/P TOTAL KNEE ARTHROPLASTY, LEFT: ICD-10-CM

## 2024-07-01 DIAGNOSIS — G89.29 CHRONIC PAIN OF LEFT KNEE: ICD-10-CM

## 2024-07-01 DIAGNOSIS — Z47.1 AFTERCARE FOLLOWING LEFT KNEE JOINT REPLACEMENT SURGERY: ICD-10-CM

## 2024-07-01 DIAGNOSIS — M25.562 CHRONIC PAIN OF LEFT KNEE: ICD-10-CM

## 2024-07-01 PROCEDURE — 97110 THERAPEUTIC EXERCISES: CPT | Mod: GP | Performed by: PHYSICAL THERAPIST

## 2024-07-01 RX ORDER — CYCLOBENZAPRINE HCL 10 MG
10 TABLET ORAL 3 TIMES DAILY PRN
Qty: 90 TABLET | Refills: 1 | Status: SHIPPED | OUTPATIENT
Start: 2024-07-01

## 2024-07-01 NOTE — PROGRESS NOTES
Physical Therapy    Physical Therapy Treatment & Re-Check     Patient Name: Festus Menjivar  MRN: [id number]  Today's Date: 7/1/2024  Time Calculation  Start Time: 1450  Stop Time: 1528  Time Calculation (min): 38 min     PT Therapeutic Procedures Time Entry  Therapeutic Exercise Time Entry: 38                   Current Problem  1. S/P total knee arthroplasty, left  Follow Up In Physical Therapy      2. Chronic pain of left knee  Follow Up In Physical Therapy      3. Aftercare following left knee joint replacement surgery  Follow Up In Physical Therapy        Problem List Items Addressed This Visit             ICD-10-CM    Chronic pain of left knee M25.562, G89.29    S/P total knee arthroplasty, left Z96.652     Other Visit Diagnoses         Codes    Aftercare following left knee joint replacement surgery     Z47.1, Z96.652            SUBJECTIVE  Subjective   General  Reason for Referral: s/p L TKA  Referred By: Dr. Murphy  Past Medical History Relevant to Rehab: Asthma  General Comment: 40 V limit combined with OT, all remain, no auth  Precautions  Precautions  Post-Surgical Precautions: Left total knee precautions  Precautions Comment: DOS 4/29/24       Pain       Patient reports that she is doing well. Reports she has been working on her motion and flexibility which has been going well.   Patient reports compliance with HEP.     VISIT NUMBER: 12  EVAL 5/21/24   Re-Check 7/1/24  Auth not required      Treatments:  TherEx:  Bike 5 min   IB 2x1 min   HSS 2x30 seconds (B)   Step Knee Flexion Stretch 10/10, 2 min   Shuttle 6 bands, 3x10  SL Shuttle 4 bands, 3x10   Step Ups 6 in, 2x10 (B)   Step Downs 4 in 2x10 (B) NEW   Side Stepping Green, 2 laps   Monster Walks Green, 2 laps NEW   Stand Hip ABD Green, 2x10 (B) NEW   Quad Ext 11 lbs, 2x10 (B)   HSC 22 lbs, 2x10          Objective   Lower Extremity ROM:  Date:  5/21/24 7/1/24    RIGHT LEFT LEFT   Knee Extension NL -10 0   Knee Flexion NL 85 AROM 107, PROM 118      Strength   Date:  7/1/24    RIGHT LEFT   Knee Extension 5/5 4/5   Knee Flexion 5/5 4/5       Assessment:     Patient is making good and steady progress with demo'd improvement in motion, strength, and gait mechanics. Patient is progressing through rehab well.     This session exercises were progressed (p) or added (NEW) as documented in treatment section.  Pt required minimal to moderate cues and demonstration to complete exercises with proper form and responded without adverse effects.        Plan:     Continue to progress treatment to address strength, motion, jt mobility, soft tissue mobility, and flexibility deficits impacting patient's return to PLOF unrestricted and symptom free.     Goals:  Active       PT Problem       LEFS 70+ (Progressing)       Start:  05/21/24    Expected End:  07/16/24            Pt will demo improved AROM in L knee(s) >/= 120 FLEX and 0 EXT for improved ease with functional activities, mobility, and transfers for progression toward independence with ADL's & IADL's.   (Progressing)       Start:  05/21/24    Expected End:  07/16/24            Pt will demo improved MMT to at least 4/5 on 0-5 point scale in BLE for improved strength and stability, and improved ease with transfers, lifting/carrying, and proper mechanics with ADL's & IADL's.   (Progressing)       Start:  05/21/24    Expected End:  07/16/24            Patient will be able to tolerate continuous ambulation for at least 30+ minutes or greater without pain or limitation from prior level of function to improve safety and function with community mobility.   (Progressing)       Start:  05/21/24    Expected End:  07/16/24            Patient will demonstrate improved standing tolerance to 60+ mins, independently, with good mechanics, and without assistance to allow for improved safety and function with ADLs, HHCs, and all functional mobility.    (Progressing)       Start:  05/21/24    Expected End:  07/16/24            Pt will  demo reciprocal gait pattern with ascent and descent of full flight of stairs with normal mechanics and without pain to allow for return to PLOF and safety with home and community mobility.   (Progressing)       Start:  05/21/24    Expected End:  07/16/24

## 2024-07-02 ENCOUNTER — TELEPHONE (OUTPATIENT)
Dept: PRIMARY CARE | Facility: CLINIC | Age: 59
End: 2024-07-02
Payer: COMMERCIAL

## 2024-07-02 NOTE — TELEPHONE ENCOUNTER
----- Message from Festus Menjivar sent at 7/2/2024  3:40 PM EDT -----  Regarding: Flexeril  Contact: [phone]  Sorry I forgot to ask about this, if it was able to be refilled.  Also, I was only asking for something through 7/16, not a 30 day supply.  I just wanted Doc to know that.  Does Dr. Hancock have any other recommendation or do all pain relievers contain an opioid?  I’ve used Tylenol or Advil.  Thanks.

## 2024-07-02 NOTE — TELEPHONE ENCOUNTER
Uvaldo Hancock MD  Do [id number] [facility]1 Clinical Support Staff5 minutes ago (4:21 PM)       Okay to use Tylenol and Advil alternating doses if she needs to

## 2024-07-05 ENCOUNTER — APPOINTMENT (OUTPATIENT)
Dept: PHYSICAL THERAPY | Facility: CLINIC | Age: 59
End: 2024-07-05
Payer: COMMERCIAL

## 2024-07-08 ENCOUNTER — TREATMENT (OUTPATIENT)
Dept: PHYSICAL THERAPY | Facility: CLINIC | Age: 59
End: 2024-07-08
Payer: COMMERCIAL

## 2024-07-08 DIAGNOSIS — Z47.1 AFTERCARE FOLLOWING LEFT KNEE JOINT REPLACEMENT SURGERY: ICD-10-CM

## 2024-07-08 DIAGNOSIS — M25.562 CHRONIC PAIN OF LEFT KNEE: ICD-10-CM

## 2024-07-08 DIAGNOSIS — G89.29 CHRONIC PAIN OF LEFT KNEE: ICD-10-CM

## 2024-07-08 DIAGNOSIS — Z96.652 S/P TOTAL KNEE ARTHROPLASTY, LEFT: ICD-10-CM

## 2024-07-08 DIAGNOSIS — Z96.652 AFTERCARE FOLLOWING LEFT KNEE JOINT REPLACEMENT SURGERY: ICD-10-CM

## 2024-07-08 PROCEDURE — 97110 THERAPEUTIC EXERCISES: CPT | Mod: GP | Performed by: PHYSICAL THERAPIST

## 2024-07-08 ASSESSMENT — PAIN - FUNCTIONAL ASSESSMENT: PAIN_FUNCTIONAL_ASSESSMENT: 0-10

## 2024-07-08 ASSESSMENT — PAIN SCALES - GENERAL: PAINLEVEL_OUTOF10: 1

## 2024-07-08 NOTE — PROGRESS NOTES
Physical Therapy    Physical Therapy Treatment    Patient Name: Festus Menjivar  MRN: [id number]  Today's Date: 7/8/2024  Time Calculation  Start Time: 1359  Stop Time: 1440  Time Calculation (min): 41 min     PT Therapeutic Procedures Time Entry  Therapeutic Exercise Time Entry: 41                   Current Problem  1. S/P total knee arthroplasty, left  Follow Up In Physical Therapy      2. Chronic pain of left knee  Follow Up In Physical Therapy      3. Aftercare following left knee joint replacement surgery  Follow Up In Physical Therapy        Problem List Items Addressed This Visit             ICD-10-CM    Chronic pain of left knee M25.562, G89.29    S/P total knee arthroplasty, left Z96.652     Other Visit Diagnoses         Codes    Aftercare following left knee joint replacement surgery     Z47.1, Z96.652            SUBJECTIVE  Subjective   General  Reason for Referral: s/p L TKA  Referred By: Dr. Murphy  Past Medical History Relevant to Rehab: Asthma  General Comment: 40 V limit combined with OT, all remain, no auth  Precautions  Precautions  Post-Surgical Precautions: Left total knee precautions  Precautions Comment: DOS 4/29/24       Pain  Pain Assessment: 0-10  0-10 (Numeric) Pain Score: 1  Pain Location: Knee  Pain Orientation: Left    Patient reports that she is doing well. States pain is minimal and her motion is much improved. Has MD appt next week. States that she is still leery of steps at work d/t different heights.   Patient reports compliance with HEP.     VISIT NUMBER: 13  EVAL 5/21/24   Re-Check 7/1/24  Auth not required      Treatments:  TherEx:  Bike 5 min   IB 2x1 min   HSS 2x30 seconds (B)   Shuttle 6 bands, 3x10  SL Shuttle 4 bands, 3x10   Step Ups 6 in, 2x10 (B)   Step Downs 4 in 2x10 (B)   Side Stepping Green, 2 laps   Monster Walks Green, 2 laps   Stand Hip ABD Green, 2x10 (B)   Quad Ext 11 lbs, 2x10 (B)   HSC 22 lbs, 2x10   RB 2x1 min NEW        Objective   No objective measures  assessed this visit       Assessment:     Patient tolerates session well. Able to progress balance and strengthening this date. Patient is progressing steadily.     This session exercises were progressed (p) or added (NEW) as documented in treatment section.  Pt required minimal to moderate cues and demonstration to complete exercises with proper form and responded without adverse effects.        Plan:     Continue to progress treatment to address strength, motion, jt mobility, soft tissue mobility, and flexibility deficits impacting patient's return to PLOF unrestricted and symptom free.     Goals:  Active       PT Problem       LEFS 70+ (Progressing)       Start:  05/21/24    Expected End:  07/16/24            Pt will demo improved AROM in L knee(s) >/= 120 FLEX and 0 EXT for improved ease with functional activities, mobility, and transfers for progression toward independence with ADL's & IADL's.   (Progressing)       Start:  05/21/24    Expected End:  07/16/24            Pt will demo improved MMT to at least 4/5 on 0-5 point scale in BLE for improved strength and stability, and improved ease with transfers, lifting/carrying, and proper mechanics with ADL's & IADL's.   (Progressing)       Start:  05/21/24    Expected End:  07/16/24            Patient will be able to tolerate continuous ambulation for at least 30+ minutes or greater without pain or limitation from prior level of function to improve safety and function with community mobility.   (Progressing)       Start:  05/21/24    Expected End:  07/16/24            Patient will demonstrate improved standing tolerance to 60+ mins, independently, with good mechanics, and without assistance to allow for improved safety and function with ADLs, HHCs, and all functional mobility.    (Progressing)       Start:  05/21/24    Expected End:  07/16/24            Pt will demo reciprocal gait pattern with ascent and descent of full flight of stairs with normal mechanics and  without pain to allow for return to PLOF and safety with home and community mobility.   (Progressing)       Start:  05/21/24    Expected End:  07/16/24

## 2024-07-10 ENCOUNTER — TREATMENT (OUTPATIENT)
Dept: PHYSICAL THERAPY | Facility: CLINIC | Age: 59
End: 2024-07-10
Payer: COMMERCIAL

## 2024-07-10 DIAGNOSIS — Z47.1 AFTERCARE FOLLOWING LEFT KNEE JOINT REPLACEMENT SURGERY: ICD-10-CM

## 2024-07-10 DIAGNOSIS — G89.29 CHRONIC PAIN OF LEFT KNEE: ICD-10-CM

## 2024-07-10 DIAGNOSIS — Z96.652 AFTERCARE FOLLOWING LEFT KNEE JOINT REPLACEMENT SURGERY: ICD-10-CM

## 2024-07-10 DIAGNOSIS — M25.562 CHRONIC PAIN OF LEFT KNEE: ICD-10-CM

## 2024-07-10 DIAGNOSIS — Z96.652 S/P TOTAL KNEE ARTHROPLASTY, LEFT: ICD-10-CM

## 2024-07-10 PROCEDURE — 97110 THERAPEUTIC EXERCISES: CPT | Mod: GP | Performed by: PHYSICAL THERAPIST

## 2024-07-10 ASSESSMENT — PAIN - FUNCTIONAL ASSESSMENT: PAIN_FUNCTIONAL_ASSESSMENT: 0-10

## 2024-07-10 ASSESSMENT — PAIN SCALES - GENERAL: PAINLEVEL_OUTOF10: 1

## 2024-07-10 NOTE — PROGRESS NOTES
Physical Therapy    Physical Therapy Treatment & Discharge     Patient Name: Festus Menjivar  MRN: [id number]  Today's Date: 7/10/2024  Time Calculation  Start Time: 1445  Stop Time: 1514  Time Calculation (min): 29 min     PT Therapeutic Procedures Time Entry  Therapeutic Exercise Time Entry: 29                   Current Problem  1. S/P total knee arthroplasty, left  Follow Up In Physical Therapy      2. Chronic pain of left knee  Follow Up In Physical Therapy      3. Aftercare following left knee joint replacement surgery  Follow Up In Physical Therapy        Problem List Items Addressed This Visit             ICD-10-CM    Chronic pain of left knee M25.562, G89.29    S/P total knee arthroplasty, left Z96.652     Other Visit Diagnoses         Codes    Aftercare following left knee joint replacement surgery     Z47.1, Z96.652            SUBJECTIVE  Subjective   General  Reason for Referral: s/p L TKA  Referred By: Dr. Murphy  Past Medical History Relevant to Rehab: Asthma  General Comment: 40 V limit combined with OT, all remain, no auth  Precautions  Precautions  Post-Surgical Precautions: Left total knee precautions  Precautions Comment: DOS 4/29/24       Pain  Pain Assessment: 0-10  0-10 (Numeric) Pain Score: 1  Pain Location: Knee  Pain Orientation: Left    Patient reports that she is doing well. States that there still seems to be a bit of stiffness but feels like she is able to manage it at home with her HEP.   Patient reports compliance with HEP.     VISIT NUMBER: 14  EVAL 5/21/24   Re-Check 7/1/24  Auth not required      Treatments:  TherEx:  Bike 5 min   IB 2x1 min   HSS 2x30 seconds (B)   Step Ups 6 in, 2x10 (B)  Step Downs 6 in 2x10 (B)   STS 2x10 NEW   Side Stepping Green, 2 laps   Monster Walks Green, 2 laps   Stand Hip ABD Green, 2x10 (B)     Access Code: [email or web address]  URL: [email or web address]  Date: 07/10/2024  Prepared by: Nani Lundy    Exercises  - Gastroc Stretch with  Foot at Wall  - 1 x daily - 3 x weekly - 2 sets - 1 reps - 1 minute  hold  - Standing Hamstring Stretch with Step  - 1 x daily - 3 x weekly - 2 sets - 1 reps - 1 minute hold  - Step Up  - 1 x daily - 3 x weekly - 3 sets - 10 reps  - Forward Step Down  - 1 x daily - 3 x weekly - 3 sets - 10 reps  - Sit to Stand  - 1 x daily - 3 x weekly - 3 sets - 10 reps  - Side Stepping with Resistance at Ankles  - 1 x daily - 3 x weekly - 1 sets - 3 reps  - Band Walks  - 1 x daily - 3 x weekly - 1 sets - 3 reps  - Hip Abduction with Resistance Loop  - 1 x daily - 3 x weekly - 3 sets - 10 reps       Objective   Lower Extremity ROM:  Date:  5/21/24 7/1/24    RIGHT LEFT LEFT   Knee Extension NL -10 0   Knee Flexion NL 85 AROM 107, PROM 118     Strength   Date:  7/1/24    RIGHT LEFT   Knee Extension 5/5 4/5   Knee Flexion 5/5 4/5     Outcome Measures:  Other Measures  Lower Extremity Funtional Score (LEFS): 70/80     Assessment:     Patient is appropriate for d/c from PT at this time. Patient has met goals for PT. Patient has reached max benefit from skilled PT care. They are independent and safe with HEP to continue with self-care of condition at home with HEP.     This session exercises were progressed (p) or added (NEW) as documented in treatment section.  Pt required minimal to moderate cues and demonstration to complete exercises with proper form and responded without adverse effects.        Plan:   DC to home with HEP     Goals:  Resolved       PT Problem       LEFS 70+ (Met)       Start:  05/21/24    Expected End:  07/16/24    Resolved:  07/10/24         Pt will demo improved AROM in L knee(s) >/= 120 FLEX and 0 EXT for improved ease with functional activities, mobility, and transfers for progression toward independence with ADL's & IADL's.   (Met)       Start:  05/21/24    Expected End:  07/16/24    Resolved:  07/10/24         Pt will demo improved MMT to at least 4/5 on 0-5 point scale in BLE for improved strength and  stability, and improved ease with transfers, lifting/carrying, and proper mechanics with ADL's & IADL's.   (Met)       Start:  05/21/24    Expected End:  07/16/24    Resolved:  07/10/24         Patient will be able to tolerate continuous ambulation for at least 30+ minutes or greater without pain or limitation from prior level of function to improve safety and function with community mobility.   (Met)       Start:  05/21/24    Expected End:  07/16/24    Resolved:  07/10/24         Patient will demonstrate improved standing tolerance to 60+ mins, independently, with good mechanics, and without assistance to allow for improved safety and function with ADLs, HHCs, and all functional mobility.    (Met)       Start:  05/21/24    Expected End:  07/16/24    Resolved:  07/10/24         Pt will demo reciprocal gait pattern with ascent and descent of full flight of stairs with normal mechanics and without pain to allow for return to PLOF and safety with home and community mobility.   (Met)       Start:  05/21/24    Expected End:  07/16/24    Resolved:  07/10/24

## 2024-07-11 DIAGNOSIS — F41.9 ANXIETY: ICD-10-CM

## 2024-07-11 RX ORDER — DIAZEPAM 5 MG/1
5 TABLET ORAL 2 TIMES DAILY PRN
Qty: 14 TABLET | Refills: 0 | Status: SHIPPED | OUTPATIENT
Start: 2024-07-11 | End: 2024-08-10

## 2024-07-11 RX ORDER — DIAZEPAM 5 MG/1
5 TABLET ORAL 2 TIMES DAILY PRN
Qty: 60 TABLET | Refills: 0 | Status: CANCELLED | OUTPATIENT
Start: 2024-07-11 | End: 2024-08-10

## 2024-07-11 NOTE — TELEPHONE ENCOUNTER
Festus LOUISE Do [id number] [facility] Clinical Support Staff (supporting Uvaldo Hancock MD)         7/11/24  8:47 AM  Can I please get a refill on this medication.  Thank you.

## 2024-07-11 NOTE — TELEPHONE ENCOUNTER
PATIENT SCHEDULED AN APPOINTMENT FOR 7/18/24 AT 6 PM.   MEDICATION PENDED FOR A SHORT SUPPLY.       All Conversations: Diazepam  (Oldest Message First)July 11, 2024  Festus [id number] [place] [address] Clinical Support Staff (supporting Uvaldo Hanccok MD)         7/11/24  8:47 AM  Can I please get a refill on this medication.  Thank you.

## 2024-07-16 ENCOUNTER — HOSPITAL ENCOUNTER (OUTPATIENT)
Dept: RADIOLOGY | Facility: CLINIC | Age: 59
Discharge: HOME | End: 2024-07-16
Payer: COMMERCIAL

## 2024-07-16 ENCOUNTER — OFFICE VISIT (OUTPATIENT)
Dept: ORTHOPEDIC SURGERY | Facility: CLINIC | Age: 59
End: 2024-07-16
Payer: COMMERCIAL

## 2024-07-16 DIAGNOSIS — Z47.1 AFTERCARE FOLLOWING LEFT KNEE JOINT REPLACEMENT SURGERY: ICD-10-CM

## 2024-07-16 DIAGNOSIS — Z96.652 AFTERCARE FOLLOWING LEFT KNEE JOINT REPLACEMENT SURGERY: ICD-10-CM

## 2024-07-16 DIAGNOSIS — M17.11 PRIMARY OSTEOARTHRITIS OF RIGHT KNEE: ICD-10-CM

## 2024-07-16 PROCEDURE — 99213 OFFICE O/P EST LOW 20 MIN: CPT | Mod: 24,25 | Performed by: ORTHOPAEDIC SURGERY

## 2024-07-16 PROCEDURE — 73562 X-RAY EXAM OF KNEE 3: CPT | Mod: LEFT SIDE | Performed by: ORTHOPAEDIC SURGERY

## 2024-07-16 PROCEDURE — 73562 X-RAY EXAM OF KNEE 3: CPT | Mod: LT

## 2024-07-16 PROCEDURE — 99211 OFF/OP EST MAY X REQ PHY/QHP: CPT | Mod: 25 | Performed by: ORTHOPAEDIC SURGERY

## 2024-07-16 PROCEDURE — 99214 OFFICE O/P EST MOD 30 MIN: CPT | Performed by: ORTHOPAEDIC SURGERY

## 2024-07-16 PROCEDURE — 20610 DRAIN/INJ JOINT/BURSA W/O US: CPT | Mod: RT | Performed by: ORTHOPAEDIC SURGERY

## 2024-07-16 PROCEDURE — 1036F TOBACCO NON-USER: CPT | Performed by: ORTHOPAEDIC SURGERY

## 2024-07-16 PROCEDURE — 99024 POSTOP FOLLOW-UP VISIT: CPT | Performed by: ORTHOPAEDIC SURGERY

## 2024-07-16 PROCEDURE — 2500000004 HC RX 250 GENERAL PHARMACY W/ HCPCS (ALT 636 FOR OP/ED): Performed by: ORTHOPAEDIC SURGERY

## 2024-07-16 PROCEDURE — 2500000005 HC RX 250 GENERAL PHARMACY W/O HCPCS: Performed by: ORTHOPAEDIC SURGERY

## 2024-07-16 NOTE — PROGRESS NOTES
Subjective    Patient ID: Festus    Chief Complaint:   Chief Complaint   Patient presents with    Left Knee - Follow-up     JATINDER TKR 4/29/24  XRAYS TODAY     History of present illness    58-year-old female presented to clinic today for her 3-month postop visit status post left total knee Jatinder.  Overall doing extremely well in regards to left knee.  She is back to doing all her activities without any hindrance.  She states that she is actually starting to feel little bit of increased pain on the right knee.  She had cortisone injection just prior to her left knee surgery on the right knee.  Cortisone did help but she is now starting to return of her symptoms.      Past medical , Surgical, Family and social history reviewed.      Physical exam  General: No acute distress and breathing comfortably.  Patient is pleasant and cooperative with the examination.    Extremity  Left knee is neurovascular intact.  Range of motion 0 to 115 degrees.  Minimal edema.  No instability or infection.  Incision is well-healed at this time.  No calf swelling or tenderness.  Compartment soft.  Capillary refill within normal is distally.  Mild tightness IT band.    Right knee is neurovascular intact.  The affected knee was examined and inspected and was tender to touch along the medial aspect.  The patient has catching/locking and occasional mechanical symptoms.  The skin was intact without breakdown or open wounds.  There was a mild Kelsey exam seen without evidence of instability in the collateral ligaments or in the anterior posterior plane.  There was a negative Lachman's test, pivot shift test, and posterior drawer sign.  There was no foot drop, numbness or tingling.  Sensation, reflexes, and pulses in the foot and ankle were present.  There was an effusion but range of motion was good and straight leg raise testing was normal.  Knee range of motion was 0 degrees of extension to 125 degrees of flexion.  The patient had the  ability to bear weight but with discomfort.  The patient's gait was antalgic secondary to the discomfort.    Diagnostics  Please see dictated x-ray report  No results found.     Procedure  Inj/Asp: Right knee on 7/16/2024 4:38 PM  Indications: pain and diagnostic evaluation  Details: 22 G needle, anteromedial approach  Medications: 4 mL lidocaine 10 mg/mL (1 %); 2 mL betamethasone acet,sod phos 6 mg/mL  Outcome: tolerated well, no immediate complications  Procedure, treatment alternatives, risks and benefits explained, specific risks discussed. Consent was given by the patient. Immediately prior to procedure a time out was called to verify the correct patient, procedure, equipment, support staff and site/side marked as required. Patient was prepped and draped in the usual sterile fashion.             Assessment  Status post left total knee Jatinder  Right knee osteoarthritis    Treatment plan  1.  This time we discussed continued conservative treatment for the right knee.  She wished proceed with cortisone injection today performed without complication  2.  She will continue weightbearing activity as tolerated bilateral knees.  Continue with outpatient physical therapy for the left knee.  3.  She will follow with us in 3 months for reevaluation new x-rays left knee if she is symptomatic.  We may proceed with right knee x-rays at that time.  For complete plan and/or surgical details, please refer to Dr. Murphy's portion of the split/shared dictation.  4.  All of the patient's questions were answered.    Orders Placed This Encounter    XR knee left 3 views       This note was prepared using voice recognition software.  The details of this note are correct and have been reviewed, and corrected to the best of my ability.  Some grammatical areas may persist related to the Dragon software    Carter Sainz PA-C, [facility]  [facility] [place]    [phone]    In a face-to-face encounter performed today,  I Darrion Murphy MD performed a history and physical examination, discussed pertinent diagnostic studies if indicated, and discussed diagnosis and management strategies with both the patient and the midlevel provider.  I reviewed the midlevel's note and agree with the documented findings and plan of care.  Greater than 50% of the evaluation and treatment decision was performed by the physician myself during today's visit.    58-year-old female here for 2 issues versus follow-up of her left total knee replacement from 4/29/2024 states her left knee is coming along very well she is very happy with her progress she is ambulating without assist device the right knee is giving her trouble she is having popping clicking grinding and stiffness in her right knee.  On examination left knee has a well-healed midline incision range of motion 0-1 15 without signs infection brisk cap refill.  Right knee has crepitus with range of motion tender medial joint space without instability.  Impression is healing left total knee replacement, right knee arthritis.  Plan is continue with her physical therapy continue work on range of motion strengthening.  She like to try cortisone in her right knee which is performed today without complication.  Patient understands the cortisone may provide temporary relief how much it helps her how long it lasts is unpredictable.  Cortisone can be repeated after 3 months if symptoms return.  She will follow-up with me in 3 months      Patient has severe impairment related to her presenting condition.  It is significantly impairing bodily function.  We did discuss surgical and nonsurgical options.    This note was prepared using voice recognition software.  The details of this note are correct and have been reviewed, and corrected to the best of my ability.  Some grammatical areas may persist related to the Dragon software    Darrion Murphy MD  Senior Attending Physician  [employer]  [facility]    [phone]

## 2024-07-17 ENCOUNTER — OFFICE VISIT (OUTPATIENT)
Dept: PRIMARY CARE | Facility: CLINIC | Age: 59
End: 2024-07-17
Payer: COMMERCIAL

## 2024-07-17 VITALS
HEIGHT: 60 IN | BODY MASS INDEX: 34.83 KG/M2 | TEMPERATURE: 97.1 F | WEIGHT: 177.4 LBS | OXYGEN SATURATION: 98 % | SYSTOLIC BLOOD PRESSURE: 140 MMHG | DIASTOLIC BLOOD PRESSURE: 70 MMHG | HEART RATE: 125 BPM | RESPIRATION RATE: 16 BRPM

## 2024-07-17 DIAGNOSIS — G89.29 CHRONIC PAIN OF BOTH KNEES: ICD-10-CM

## 2024-07-17 DIAGNOSIS — M25.561 CHRONIC PAIN OF BOTH KNEES: ICD-10-CM

## 2024-07-17 DIAGNOSIS — F41.9 ANXIETY: ICD-10-CM

## 2024-07-17 DIAGNOSIS — M25.562 CHRONIC PAIN OF LEFT KNEE: ICD-10-CM

## 2024-07-17 DIAGNOSIS — G89.29 CHRONIC PAIN OF LEFT KNEE: ICD-10-CM

## 2024-07-17 DIAGNOSIS — M25.562 CHRONIC PAIN OF BOTH KNEES: ICD-10-CM

## 2024-07-17 DIAGNOSIS — Z00.00 ROUTINE GENERAL MEDICAL EXAMINATION AT A HEALTH CARE FACILITY: ICD-10-CM

## 2024-07-17 RX ORDER — HYDROCODONE BITARTRATE AND ACETAMINOPHEN 5; 325 MG/1; MG/1
1 TABLET ORAL 2 TIMES DAILY PRN
Qty: 60 TABLET | Refills: 0 | Status: SHIPPED | OUTPATIENT
Start: 2024-07-17 | End: 2024-08-16

## 2024-07-17 RX ORDER — DIAZEPAM 5 MG/1
5 TABLET ORAL 2 TIMES DAILY PRN
Qty: 60 TABLET | Refills: 1 | Status: SHIPPED | OUTPATIENT
Start: 2024-07-17 | End: 2024-09-15

## 2024-07-17 RX ORDER — BETAMETHASONE SODIUM PHOSPHATE AND BETAMETHASONE ACETATE 3; 3 MG/ML; MG/ML
2 INJECTION, SUSPENSION INTRA-ARTICULAR; INTRALESIONAL; INTRAMUSCULAR; SOFT TISSUE
Status: COMPLETED | OUTPATIENT
Start: 2024-07-16 | End: 2024-07-16

## 2024-07-17 RX ORDER — LIDOCAINE HYDROCHLORIDE 10 MG/ML
4 INJECTION INFILTRATION; PERINEURAL
Status: COMPLETED | OUTPATIENT
Start: 2024-07-16 | End: 2024-07-16

## 2024-07-17 ASSESSMENT — PATIENT HEALTH QUESTIONNAIRE - PHQ9
2. FEELING DOWN, DEPRESSED OR HOPELESS: NOT AT ALL
SUM OF ALL RESPONSES TO PHQ9 QUESTIONS 1 AND 2: 0
1. LITTLE INTEREST OR PLEASURE IN DOING THINGS: NOT AT ALL

## 2024-07-17 NOTE — PROGRESS NOTES
Subjective   Patient ID: Festus Menjivar is a 58 y.o. female who presents for Annual Exam, Anxiety, and Pain.  HPI  Annual physical   Eats a generally healthy diet   STAYING ACTIVE   Denies any chest pain,SOB  No Abdominal pain   No black or bloody stools   Urination/BM normal   Last eye apt 2YRS  Last dental apt  2/2024  Last Gyn apt 10yrs  No new family h/o cancers or heart disease       Anxiety follow up  Taking the diazepam   Working well   100 % effective   Denies any side effects   [place] reviewed today   CSA signed 2/20/24  Last took last night     Patient presents for chronic pain of right knee.  An annual exam. is currently taking norco. Rates the pain a 1 to 5 /10 over the past 7 days. Reports that the medication gives  100% pain control/relief. OARRS reviewed today, CSA signed 2/20/24. Last took 1 month     No other concern /question   Review of Systems  Constitutional:  no chills, no fever and no night sweats.  Eyes: no blurred vision and no eyesight problems.  ENT: no hearing loss, no nasal congestion, no hoarseness and no sore throat.  Neck: no mass (es) and no swelling.  Cardiovascular: no chest pain, no intermittent leg claudication, no lower extremity edema, no palpitation and no syncope.  Respiratory: no cough, no shortness of breath during exertion, no shortness of breath at rest and no wheezing.  Gastrointestinal: no abdominal pain, no blood in stools, no constipation, no diarrhea, no melena, no nausea, no rectal pain and no vomiting.  Genitourinary: no dysuria, no change in urinary frequency, no urinary hesitancy and no feelings of urinary urgency.  Musculoskeletal: no arthralgias, no back pain and no myalgias.  Integumentary: no new skin lesions and no rashes.  Neurological: no difficulty walking, no headache, no limb weakness, no numbness and no tingling.  Psychiatric/Behavioral: no anxiety, no depression, no anhedonia and no substance use disorders.  Endocrine: no recent weight gain and no  recent weight loss.  Hematologic/Lymphatic: no tendency for easy bruising and no swollen glands    Objective   Physical Exam  Patient in for annual exam and follow-up anxiety and chronic pain.  Overall doing well 3 months out from her left knee replacement still doing the physical therapy does not have 100% return of range of motion but it is improving.  Lungs are clear cardiac and abdominal exam is benign no peripheral edema scars healing well she needs refill on her medication.  No new physical complaints.  LMP  (LMP Unknown)     Lab Results   Component Value Date    WBC 7.4 04/16/2024    HGB 14.2 04/16/2024    HCT 42.3 04/16/2024    MCV 93 04/16/2024     04/16/2024       Assessment/Plan plan refill medication routine recheck 3 months.  Continue the physical therapy.  Problem List Items Addressed This Visit    None

## 2024-07-18 ENCOUNTER — APPOINTMENT (OUTPATIENT)
Dept: PRIMARY CARE | Facility: CLINIC | Age: 59
End: 2024-07-18
Payer: COMMERCIAL

## 2024-08-22 DIAGNOSIS — Z96.652 S/P TOTAL KNEE ARTHROPLASTY, LEFT: ICD-10-CM

## 2024-08-22 RX ORDER — CYCLOBENZAPRINE HCL 10 MG
10 TABLET ORAL 3 TIMES DAILY PRN
Qty: 90 TABLET | Refills: 0 | Status: SHIPPED | OUTPATIENT
Start: 2024-08-22

## 2024-08-23 DIAGNOSIS — M25.561 CHRONIC PAIN OF BOTH KNEES: ICD-10-CM

## 2024-08-23 DIAGNOSIS — G89.29 CHRONIC PAIN OF BOTH KNEES: ICD-10-CM

## 2024-08-23 DIAGNOSIS — M25.562 CHRONIC PAIN OF BOTH KNEES: ICD-10-CM

## 2024-08-23 RX ORDER — HYDROCODONE BITARTRATE AND ACETAMINOPHEN 7.5; 325 MG/1; MG/1
1 TABLET ORAL 3 TIMES DAILY PRN
Qty: 90 TABLET | Refills: 0 | Status: SHIPPED | OUTPATIENT
Start: 2024-08-23

## 2024-09-03 DIAGNOSIS — F41.9 ANXIETY: ICD-10-CM

## 2024-09-03 RX ORDER — DIAZEPAM 5 MG/1
5 TABLET ORAL 2 TIMES DAILY PRN
Qty: 60 TABLET | Refills: 0 | Status: SHIPPED | OUTPATIENT
Start: 2024-09-03

## 2024-09-12 ENCOUNTER — TELEPHONE (OUTPATIENT)
Dept: PRIMARY CARE | Facility: CLINIC | Age: 59
End: 2024-09-12
Payer: COMMERCIAL

## 2024-09-26 DIAGNOSIS — M25.562 CHRONIC PAIN OF BOTH KNEES: Primary | ICD-10-CM

## 2024-09-26 DIAGNOSIS — M25.561 CHRONIC PAIN OF BOTH KNEES: Primary | ICD-10-CM

## 2024-09-26 DIAGNOSIS — G89.29 CHRONIC PAIN OF BOTH KNEES: Primary | ICD-10-CM

## 2024-09-26 NOTE — TELEPHONE ENCOUNTER
Rx Controlled Refill Request Telephone Encounter    Name: Festus Menjivar  :  1965    Medication Name:     HYDROcodone-acetaminophen (Norco) 7.5-325 mg tablet [[id number]]    Order Details  Dose: 1 tablet Route: oral Frequency: 3 times daily PRN for severe pain (7 - 10)   Dispense Quantity: 90 tablet Refills: 0    Note to Pharmacy: *30 DAY SUPPLY*         Sig: Take 1 tablet by mouth 3 times a day as needed for severe pain (7 - 10).     ALLERGIES:    amoxicillin    LAST DRUG SCREEN:     24  LAST MED CONTRACT:    24    Specific Pharmacy location:      [facility] - [place], [place] - [address]   [address], [facility] [address]  Phone: [phone]  Fax: [phone]  LAUREN #: --       Date of last appointment:    24  Date of next appointment:        Best number to reach patient:    [phone]

## 2024-09-27 RX ORDER — HYDROCODONE BITARTRATE AND ACETAMINOPHEN 7.5; 325 MG/1; MG/1
1 TABLET ORAL 3 TIMES DAILY PRN
Qty: 90 TABLET | Refills: 0 | Status: SHIPPED | OUTPATIENT
Start: 2024-09-27

## 2024-09-30 DIAGNOSIS — J45.909 ASTHMA, UNSPECIFIED ASTHMA SEVERITY, UNSPECIFIED WHETHER COMPLICATED, UNSPECIFIED WHETHER PERSISTENT (HHS-HCC): ICD-10-CM

## 2024-09-30 RX ORDER — ALBUTEROL SULFATE 90 UG/1
2 INHALANT RESPIRATORY (INHALATION) EVERY 4 HOURS PRN
Qty: 8 G | Refills: 2 | Status: SHIPPED | OUTPATIENT
Start: 2024-09-30

## 2024-09-30 RX ORDER — FLUTICASONE PROPIONATE AND SALMETEROL 250; 50 UG/1; UG/1
1 POWDER RESPIRATORY (INHALATION)
Qty: 60 EACH | Refills: 1 | Status: SHIPPED | OUTPATIENT
Start: 2024-09-30

## 2024-10-08 DIAGNOSIS — Z96.652 S/P TOTAL KNEE ARTHROPLASTY, LEFT: ICD-10-CM

## 2024-10-08 RX ORDER — CYCLOBENZAPRINE HCL 10 MG
10 TABLET ORAL 3 TIMES DAILY PRN
Qty: 90 TABLET | Refills: 2 | Status: SHIPPED | OUTPATIENT
Start: 2024-10-08

## 2024-10-08 NOTE — TELEPHONE ENCOUNTER
Larisa message sent about needing appointment for Diazepam.  She is also requesting refill of Flexeril.

## 2024-10-09 DIAGNOSIS — F41.9 ANXIETY: ICD-10-CM

## 2024-10-09 RX ORDER — DIAZEPAM 5 MG/1
5 TABLET ORAL 2 TIMES DAILY PRN
Qty: 16 TABLET | Refills: 0 | Status: SHIPPED | OUTPATIENT
Start: 2024-10-09

## 2024-10-09 NOTE — TELEPHONE ENCOUNTER
I have an appointment on the 17th at 3:45.  I did it online, do I still need to call the office for a partial refill on the diazepam or can you let doc know.

## 2024-10-15 ENCOUNTER — OFFICE VISIT (OUTPATIENT)
Dept: ORTHOPEDIC SURGERY | Facility: CLINIC | Age: 59
End: 2024-10-15
Payer: COMMERCIAL

## 2024-10-15 ENCOUNTER — HOSPITAL ENCOUNTER (OUTPATIENT)
Dept: RADIOLOGY | Facility: CLINIC | Age: 59
Discharge: HOME | End: 2024-10-15
Payer: COMMERCIAL

## 2024-10-15 DIAGNOSIS — M25.562 PAIN IN BOTH KNEES, UNSPECIFIED CHRONICITY: ICD-10-CM

## 2024-10-15 DIAGNOSIS — M25.561 PAIN IN BOTH KNEES, UNSPECIFIED CHRONICITY: ICD-10-CM

## 2024-10-15 DIAGNOSIS — M17.0 PRIMARY OSTEOARTHRITIS OF BOTH KNEES: Primary | ICD-10-CM

## 2024-10-15 PROCEDURE — 73562 X-RAY EXAM OF KNEE 3: CPT | Mod: 50

## 2024-10-15 PROCEDURE — 99214 OFFICE O/P EST MOD 30 MIN: CPT | Performed by: ORTHOPAEDIC SURGERY

## 2024-10-16 RX ORDER — NABUMETONE 500 MG/1
500 TABLET, FILM COATED ORAL 2 TIMES DAILY
Qty: 60 TABLET | Refills: 0 | Status: SHIPPED | OUTPATIENT
Start: 2024-10-16 | End: 2024-11-15

## 2024-10-16 NOTE — PROGRESS NOTES
History of present illness    59-year-old female here for 2 issues versus follow-up of her left total knee replacement from April states her left knee is doing very well he is a little stiff and sore at times no numbness or tingling no fevers or chills at the right knee that is giving her most of the trouble is the medial aspect of the knee she has crepitus with range of motion she had a recent cortisone injection with only mild improvement she has popping clicking grinding and swelling in the right knee significant impairment of bodily function related to her right knee arthritis.      Past medical , Surgical, Family and social history reviewed.      Physical exam  General: No acute distress and breathing comfortably.  Patient is pleasant and cooperative with the examination.    Extremity  Left knee has well-healed midline incision no sign infection range of motion 0-1 10 brisk cap refill compartments soft calf is nontender  Right knee the affected knee was examined and inspected and was tender to touch along the medial aspect.  The patient has catching/locking and occasional mechanical symptoms.  The skin was intact without breakdown or open wounds.  There was a mild Kelsey exam seen with mild evidence of instability and weakness in the collateral ligaments with laxity to varus valgus stress and in the anterior posterior plane.  There was a negative Lachman's test, pivot shift test, and posterior drawer sign.  There was no foot drop, numbness or tingling.  Sensation, reflexes, and pulses in the foot and ankle were present.  There was an effusion but range of motion was good and straight leg raise testing was normal.   The patient had the ability to bear weight but with discomfort.  The patient's gait was antalgic secondary to the discomfort. Knee range of motion was 5-115    Diagnostics      No results found.     Procedure  [ none]    Assessment  Status post left total knee replacement, right  knee arthritis    Treatment plan  1.  The natural history of the condition and its associated treatment alternatives including surgical and nonsurgical options were discussed with the patient at length.  2.  Patient continues to do very well with her left knee she will continue work on range of motion and strengthening.  She like to quit schedule her right total knee replacement for the end of the year.  The procedures risk benefits treatment alternatives and postoperative course were discussed with her she understands and wishes to proceed.  She continues to have significant impairment of bodily function related to her right knee we discussed surgical nonsurgical option.  3.  Patient has failed all forms of conservative treatment.  The joint pain is significantly affecting quality of life and activities of daily living.    The patient has pain in the joint that is increased with activity and weightbearing, and walks with an antalgic gait.  The pain is interfering with activities of daily living.  Patient has limited range of motion and pain with passive range of motion.  X-rays demonstrate joint space narrowing, subchondral sclerosis and osteophyte formation.  Symptoms are not improving with medication, physical therapy or supportive device for a period of at least 3 months.  Weight loss and smoking cessation have been discussed with the patient.  Patient advised on when to cease smoking 6-8 weeks before the procedure.      Patient would like to go and schedule joint replacement surgery.  The procedure its risks, benefits, and treatment alternatives were discussed at length and patient would like to proceed.  Patient is going to schedule the procedure at their earliest convenience and see me back for a preop visit just prior.  Preadmission testing, medical checkup, and insurance authorization will be performed in the meantime.  Patient understands a joint replacement surgery is a last resort, although a very  successful operation results are not guaranteed.  4.  All of the patient's questions were answered.    Orders Placed This Encounter    XR knee 3 views bilateral    nabumetone (Relafen) 500 mg tablet       This note was prepared using voice recognition software.  The details of this note are correct and have been reviewed, and corrected to the best of my ability.  Some grammatical areas may persist related to the Dragon software    Darrion Murphy MD  Senior Attending Physician  [facility]  [facility] [place]    [phone]

## 2024-10-17 ENCOUNTER — TELEPHONE (OUTPATIENT)
Dept: PRIMARY CARE | Facility: CLINIC | Age: 59
End: 2024-10-17
Payer: COMMERCIAL

## 2024-10-17 ENCOUNTER — APPOINTMENT (OUTPATIENT)
Dept: PRIMARY CARE | Facility: CLINIC | Age: 59
End: 2024-10-17
Payer: COMMERCIAL

## 2024-10-17 VITALS
HEIGHT: 60 IN | SYSTOLIC BLOOD PRESSURE: 112 MMHG | DIASTOLIC BLOOD PRESSURE: 70 MMHG | BODY MASS INDEX: 34.75 KG/M2 | HEART RATE: 100 BPM | OXYGEN SATURATION: 98 % | TEMPERATURE: 98.5 F | WEIGHT: 177 LBS

## 2024-10-17 DIAGNOSIS — M25.562 CHRONIC PAIN OF LEFT KNEE: ICD-10-CM

## 2024-10-17 DIAGNOSIS — G89.29 CHRONIC PAIN OF LEFT KNEE: ICD-10-CM

## 2024-10-17 DIAGNOSIS — F41.9 ANXIETY: ICD-10-CM

## 2024-10-17 DIAGNOSIS — M25.561 CHRONIC PAIN OF BOTH KNEES: Primary | ICD-10-CM

## 2024-10-17 DIAGNOSIS — M25.562 CHRONIC PAIN OF BOTH KNEES: Primary | ICD-10-CM

## 2024-10-17 DIAGNOSIS — Z12.31 BREAST CANCER SCREENING BY MAMMOGRAM: ICD-10-CM

## 2024-10-17 DIAGNOSIS — E78.5 HYPERLIPIDEMIA, UNSPECIFIED HYPERLIPIDEMIA TYPE: ICD-10-CM

## 2024-10-17 DIAGNOSIS — G89.29 CHRONIC PAIN OF BOTH KNEES: Primary | ICD-10-CM

## 2024-10-17 DIAGNOSIS — F41.1 GENERALIZED ANXIETY DISORDER: ICD-10-CM

## 2024-10-17 PROCEDURE — 99213 OFFICE O/P EST LOW 20 MIN: CPT | Performed by: FAMILY MEDICINE

## 2024-10-17 PROCEDURE — 3008F BODY MASS INDEX DOCD: CPT | Performed by: FAMILY MEDICINE

## 2024-10-17 RX ORDER — DIAZEPAM 5 MG/1
5 TABLET ORAL 2 TIMES DAILY PRN
Qty: 60 TABLET | Refills: 0 | Status: SHIPPED | OUTPATIENT
Start: 2024-10-17 | End: 2024-11-16

## 2024-10-17 RX ORDER — HYDROCODONE BITARTRATE AND ACETAMINOPHEN 5; 325 MG/1; MG/1
1 TABLET ORAL EVERY 6 HOURS PRN
Qty: 120 TABLET | Refills: 0 | Status: SHIPPED | OUTPATIENT
Start: 2024-10-17 | End: 2024-11-16

## 2024-10-17 RX ORDER — DIAZEPAM 5 MG/1
5 TABLET ORAL 2 TIMES DAILY PRN
Qty: 16 TABLET | Refills: 0 | Status: SHIPPED | OUTPATIENT
Start: 2024-10-17 | End: 2024-10-17 | Stop reason: SDUPTHER

## 2024-10-17 ASSESSMENT — PATIENT HEALTH QUESTIONNAIRE - PHQ9
2. FEELING DOWN, DEPRESSED OR HOPELESS: NOT AT ALL
1. LITTLE INTEREST OR PLEASURE IN DOING THINGS: NOT AT ALL
SUM OF ALL RESPONSES TO PHQ9 QUESTIONS 1 AND 2: 0

## 2024-10-17 NOTE — TELEPHONE ENCOUNTER
"PLEASE ADVISE ON HOW MUCH YOU WOULD LIKE THIS PATIENT TO HAVE      Festus Menjivar \"Nadia\"  P Do [id number] [facility] Clinical Support Staff (supporting Uvaldo Hancock MD)11 minutes ago (7:35 PM)       I went to get my Diazepam and instead of a 30 day supply, I only received 16?  I believe the order was repeated at what was filled to get me to my appointment today.  Please advise.  Thanks!!  "

## 2024-10-17 NOTE — PROGRESS NOTES
"Subjective   Patient ID: Festus Menjivar \"Caryn" is a 59 y.o. female who presents for Knee Pain.  HPI  Patient presents in the office today with a 3 month fu on chronic pain in both knees. Patient states she is getting RT knee surgery on dec 2nd. Ongoing X April. Has tried motrin or advil or norco.    Patient presents today for a follow-up on Pain. Is taking Norco. States She has no concerns with this medication. Rates the pain a 6/10 over the past 7 days. Reports that the medication gives 90% pain control/relief. OARRS reviewed today. Controlled substance contract signed 2/20/2024. Last took it Last night.  Review of Systems  Constitutional:  no chills, no fever and no night sweats.  Eyes: no blurred vision and no eyesight problems.  ENT: no hearing loss, no nasal congestion, no hoarseness and no sore throat.  Neck: no mass (es) and no swelling.  Cardiovascular: no chest pain, no intermittent leg claudication, no lower extremity edema, no palpitation and no syncope.  Respiratory: no cough, no shortness of breath during exertion, no shortness of breath at rest and no wheezing.  Gastrointestinal: no abdominal pain, no blood in stools, no constipation, no diarrhea, no melena, no nausea, no rectal pain and no vomiting.  Genitourinary: no dysuria, no change in urinary frequency, no urinary hesitancy and no feelings of urinary urgency.  Musculoskeletal: no arthralgias, no back pain and no myalgias.  Integumentary: no new skin lesions and no rashes.  Neurological: no difficulty walking, no headache, no limb weakness, no numbness and no tingling.  Psychiatric/Behavioral: no anxiety, no depression, no anhedonia and no substance use disorders.  Endocrine: no recent weight gain and no recent weight loss.  Hematologic/Lymphatic: no tendency for easy bruising and no swollen glands    Objective   Physical Exam  Patient in for follow-up right knee pain has left knee surgery was replacement done that is done well still the right " knee is getting worse bone-on-bone medially due to have that knee replaced December 3 with Dr. Murphy.  Needs refill on her medication to get through till then and then we will work on backing her off of the pain medication otherwise chronic problems are stable.  Well-developed well-nourished obese female in no acute distress lungs clear cardiac and abdominal exams benign right knee pain intermittent effusion no swelling today.  /70   Pulse 100   Temp 36.9 °C (98.5 °F) (Temporal)   Ht 1.524 m (5')   Wt 80.3 kg (177 lb)   LMP  (LMP Unknown)   SpO2 98%   BMI 34.57 kg/m²     Lab Results   Component Value Date    WBC 7.4 04/16/2024    HGB 14.2 04/16/2024    HCT 42.3 04/16/2024    MCV 93 04/16/2024     04/16/2024       Assessment/Plan plan follow-up after she has the knee surgery.  Problem List Items Addressed This Visit          Cardiac and Vasculature    Hyperlipidemia       Mental Health    Generalized anxiety disorder       Musculoskeletal and Injuries    Chronic pain of left knee    Relevant Medications    HYDROcodone-acetaminophen (Norco) 5-325 mg tablet     Other Visit Diagnoses       Chronic pain of both knees    -  Primary    Anxiety        Relevant Medications    diazePAM (Valium) 5 mg tablet    Breast cancer screening by mammogram

## 2024-11-04 ENCOUNTER — TRANSCRIBE ORDERS (OUTPATIENT)
Dept: ORTHOPEDIC SURGERY | Facility: CLINIC | Age: 59
End: 2024-11-04
Payer: COMMERCIAL

## 2024-11-04 DIAGNOSIS — M17.11 UNILATERAL PRIMARY OSTEOARTHRITIS, RIGHT KNEE: Primary | ICD-10-CM

## 2024-11-07 ENCOUNTER — OFFICE VISIT (OUTPATIENT)
Dept: PRIMARY CARE | Facility: CLINIC | Age: 59
End: 2024-11-07
Payer: COMMERCIAL

## 2024-11-07 VITALS
HEART RATE: 109 BPM | HEIGHT: 60 IN | SYSTOLIC BLOOD PRESSURE: 120 MMHG | RESPIRATION RATE: 16 BRPM | DIASTOLIC BLOOD PRESSURE: 68 MMHG | BODY MASS INDEX: 36.12 KG/M2 | TEMPERATURE: 97.4 F | WEIGHT: 184 LBS | OXYGEN SATURATION: 98 %

## 2024-11-07 DIAGNOSIS — R30.0 DYSURIA: ICD-10-CM

## 2024-11-07 DIAGNOSIS — M79.661 PAIN IN RIGHT LOWER LEG: ICD-10-CM

## 2024-11-07 DIAGNOSIS — M25.562 CHRONIC PAIN OF BOTH KNEES: ICD-10-CM

## 2024-11-07 DIAGNOSIS — G89.29 CHRONIC PAIN OF BOTH KNEES: ICD-10-CM

## 2024-11-07 DIAGNOSIS — Z01.818 PREOP TESTING: ICD-10-CM

## 2024-11-07 DIAGNOSIS — M25.561 CHRONIC PAIN OF BOTH KNEES: ICD-10-CM

## 2024-11-07 PROCEDURE — 3008F BODY MASS INDEX DOCD: CPT | Performed by: FAMILY MEDICINE

## 2024-11-07 PROCEDURE — 93000 ELECTROCARDIOGRAM COMPLETE: CPT | Performed by: FAMILY MEDICINE

## 2024-11-07 PROCEDURE — 99213 OFFICE O/P EST LOW 20 MIN: CPT | Performed by: FAMILY MEDICINE

## 2024-11-07 RX ORDER — HYDROCODONE BITARTRATE AND ACETAMINOPHEN 7.5; 325 MG/1; MG/1
1 TABLET ORAL 3 TIMES DAILY PRN
Qty: 90 TABLET | Refills: 0 | Status: SHIPPED | OUTPATIENT
Start: 2024-11-07

## 2024-11-07 ASSESSMENT — PATIENT HEALTH QUESTIONNAIRE - PHQ9
1. LITTLE INTEREST OR PLEASURE IN DOING THINGS: NOT AT ALL
2. FEELING DOWN, DEPRESSED OR HOPELESS: NOT AT ALL
SUM OF ALL RESPONSES TO PHQ9 QUESTIONS 1 AND 2: 0

## 2024-11-07 NOTE — PROGRESS NOTES
"Subjective   Patient ID: Festus Menjivar \"Nadia\" is a 59 y.o. female who presents for Pre-op Exam and Pain.    HPI    Patient presents today for pre-op exam  Surgeon- Dr. Murphy  Date- 12/2/24  Facility- [facility]   Procedure-Total Right Knee Replacement    Patient denies having history of any allergic reactions to anesthesia.  Patient is not currently under the care of Cardiology    Pt is going to get EKG done here     No other questions and/or concerns for today's visit.    Pain  follow up  Taking the Norco   Working well    50% effective   Denies any side effects   OARRS reviewed today   CSA 2/20/24  Last took 2  day ago           Review of Systems  Constitutional:  no chills, no fever and no night sweats.  Eyes: no blurred vision and no eyesight problems.  ENT: no hearing loss, no nasal congestion, no hoarseness and no sore throat.  Neck: no mass (es) and no swelling.  Cardiovascular: no chest pain, no intermittent leg claudication, no lower extremity edema, no palpitation and no syncope.  Respiratory: no cough, no shortness of breath during exertion, no shortness of breath at rest and no wheezing.  Gastrointestinal: no abdominal pain, no blood in stools, no constipation, no diarrhea, no melena, no nausea, no rectal pain and no vomiting.  Genitourinary: no dysuria, no change in urinary frequency, no urinary hesitancy and no feelings of urinary urgency.  Musculoskeletal: no arthralgias, no back pain and no myalgias.  Integumentary: no new skin lesions and no rashes.  Neurological: no difficulty walking, no headache, no limb weakness, no numbness and no tingling.  Psychiatric/Behavioral: no anxiety, no depression, no anhedonia and no substance use disorders.  Endocrine: no recent weight gain and no recent weight loss.  Hematologic/Lymphatic: no tendency for easy bruising and no swollen glands    Objective   Physical Exam  Patient in for clearance for having right knee replacement " done in December.  Overall doing well no complaints did well after the with the other knee was replaced.  Well-developed well-nourished obese female in no acute distress denies chest pain or shortness of breath with exertion.  Just cystic chronic knee pain no other problems physical exam today's office visit constitutional alert and oriented x3.    Head is atraumatic HEENT is within normal limits.    Neck supple no masses full range of motion.    Thyroid is normal in size no thyromegaly there is no carotid bruits.    Pulmonary exam shows clear to auscultation no respiratory distress.    Cardiovascular shows no murmur rub or gallop.  Regular rate and rhythm.    Abdominal exam soft nontender no hepatosplenomegaly or masses normal bowel sounds no rebound no guarding.    Musculoskeletal exam no joint pain no muscle pain full range of motion.    Psych exam normal mood and affect.    Dermatologic exam no skin lesions no rash no blemishes.    Neuro exam is no focal deficits.  Normal exam.    Extremities no edema normal pulses normal capillary refill.    /68 (BP Location: Left arm, Patient Position: Sitting, BP Cuff Size: Adult)   Pulse 109   Temp 36.3 °C (97.4 °F) (Temporal)   Resp 16   Ht 1.524 m (5')   Wt 83.5 kg (184 lb)   LMP  (LMP Unknown)   SpO2 98%   BMI 35.94 kg/m²     Lab Results   Component Value Date    WBC 7.4 04/16/2024    HGB 14.2 04/16/2024    HCT 42.3 04/16/2024    MCV 93 04/16/2024     04/16/2024       Assessment/Plan will get blood drawn follow-up we have the results patient is cleared for the surgery once she recovers from the surgery we will try weaning her off of the pain medication.  Problem List Items Addressed This Visit       Preop testing    BMI 35.0-35.9,adult    Dysuria    Pain in right lower leg     Other Visit Diagnoses       Chronic pain of both knees

## 2024-11-11 DIAGNOSIS — M25.561 CHRONIC PAIN OF BOTH KNEES: ICD-10-CM

## 2024-11-11 DIAGNOSIS — M25.562 CHRONIC PAIN OF BOTH KNEES: ICD-10-CM

## 2024-11-11 DIAGNOSIS — G89.29 CHRONIC PAIN OF BOTH KNEES: ICD-10-CM

## 2024-11-11 DIAGNOSIS — F41.9 ANXIETY: ICD-10-CM

## 2024-11-11 NOTE — TELEPHONE ENCOUNTER
"Last seen 11/7/24  Medication pended  Uds contract 2/20/24      Festus DOMINIC Menjivar \"Nadia\"  P Do [id number] [facility] Clinical Support Staff (supporting Uvaldo Hancock MD)1 hour ago (9:37 AM)       Can I please get a refill on the above?  The low dose Norco and diazepam.  Soon you will be rid of me for pain meds.  2nd knee surgery coming up soon.  Thank you!  "

## 2024-11-12 ENCOUNTER — HOSPITAL ENCOUNTER (OUTPATIENT)
Dept: RADIOLOGY | Facility: HOSPITAL | Age: 59
Discharge: HOME | End: 2024-11-12
Payer: COMMERCIAL

## 2024-11-12 DIAGNOSIS — M17.11 UNILATERAL PRIMARY OSTEOARTHRITIS, RIGHT KNEE: ICD-10-CM

## 2024-11-12 PROCEDURE — 73700 CT LOWER EXTREMITY W/O DYE: CPT | Mod: RT

## 2024-11-20 ENCOUNTER — LAB (OUTPATIENT)
Dept: LAB | Facility: LAB | Age: 59
End: 2024-11-20
Payer: COMMERCIAL

## 2024-11-20 DIAGNOSIS — Z01.818 PREOP TESTING: ICD-10-CM

## 2024-11-20 DIAGNOSIS — M79.661 PAIN IN RIGHT LOWER LEG: ICD-10-CM

## 2024-11-20 DIAGNOSIS — M17.11 UNILATERAL PRIMARY OSTEOARTHRITIS, RIGHT KNEE: ICD-10-CM

## 2024-11-20 LAB
ALBUMIN SERPL BCP-MCNC: 4.7 G/DL (ref 3.4–5)
ALP SERPL-CCNC: 77 U/L (ref 33–136)
ALT SERPL W P-5'-P-CCNC: 22 U/L (ref 7–45)
ANION GAP SERPL CALC-SCNC: 12 MMOL/L (ref 10–20)
APTT PPP: 36 SECONDS (ref 27–38)
AST SERPL W P-5'-P-CCNC: 29 U/L (ref 9–39)
BASOPHILS # BLD AUTO: 0.07 X10*3/UL (ref 0–0.1)
BASOPHILS NFR BLD AUTO: 1.1 %
BILIRUB SERPL-MCNC: 0.5 MG/DL (ref 0–1.2)
BUN SERPL-MCNC: 17 MG/DL (ref 6–23)
CALCIUM SERPL-MCNC: 10.2 MG/DL (ref 8.6–10.6)
CHLORIDE SERPL-SCNC: 106 MMOL/L (ref 98–107)
CO2 SERPL-SCNC: 26 MMOL/L (ref 21–32)
CREAT SERPL-MCNC: 0.73 MG/DL (ref 0.5–1.05)
EGFRCR SERPLBLD CKD-EPI 2021: >90 ML/MIN/1.73M*2
EOSINOPHIL # BLD AUTO: 0.31 X10*3/UL (ref 0–0.7)
EOSINOPHIL NFR BLD AUTO: 5 %
ERYTHROCYTE [DISTWIDTH] IN BLOOD BY AUTOMATED COUNT: 13.1 % (ref 11.5–14.5)
EST. AVERAGE GLUCOSE BLD GHB EST-MCNC: 105 MG/DL
GLUCOSE SERPL-MCNC: 107 MG/DL (ref 74–99)
HBA1C MFR BLD: 5.3 %
HCT VFR BLD AUTO: 45.2 % (ref 36–46)
HGB BLD-MCNC: 14.7 G/DL (ref 12–16)
IMM GRANULOCYTES # BLD AUTO: 0.01 X10*3/UL (ref 0–0.7)
IMM GRANULOCYTES NFR BLD AUTO: 0.2 % (ref 0–0.9)
INR PPP: 1 (ref 0.9–1.1)
LYMPHOCYTES # BLD AUTO: 2.03 X10*3/UL (ref 1.2–4.8)
LYMPHOCYTES NFR BLD AUTO: 32.4 %
MCH RBC QN AUTO: 30.4 PG (ref 26–34)
MCHC RBC AUTO-ENTMCNC: 32.5 G/DL (ref 32–36)
MCV RBC AUTO: 94 FL (ref 80–100)
MONOCYTES # BLD AUTO: 0.49 X10*3/UL (ref 0.1–1)
MONOCYTES NFR BLD AUTO: 7.8 %
NEUTROPHILS # BLD AUTO: 3.35 X10*3/UL (ref 1.2–7.7)
NEUTROPHILS NFR BLD AUTO: 53.5 %
NRBC BLD-RTO: 0 /100 WBCS (ref 0–0)
PLATELET # BLD AUTO: 358 X10*3/UL (ref 150–450)
POTASSIUM SERPL-SCNC: 4.3 MMOL/L (ref 3.5–5.3)
PROT SERPL-MCNC: 6.9 G/DL (ref 6.4–8.2)
PROTHROMBIN TIME: 11.7 SECONDS (ref 9.8–12.8)
RBC # BLD AUTO: 4.83 X10*6/UL (ref 4–5.2)
SODIUM SERPL-SCNC: 140 MMOL/L (ref 136–145)
WBC # BLD AUTO: 6.3 X10*3/UL (ref 4.4–11.3)

## 2024-11-20 PROCEDURE — 80053 COMPREHEN METABOLIC PANEL: CPT

## 2024-11-20 PROCEDURE — 85610 PROTHROMBIN TIME: CPT

## 2024-11-20 PROCEDURE — 83036 HEMOGLOBIN GLYCOSYLATED A1C: CPT

## 2024-11-20 PROCEDURE — 36415 COLL VENOUS BLD VENIPUNCTURE: CPT

## 2024-11-20 PROCEDURE — 85025 COMPLETE CBC W/AUTO DIFF WBC: CPT

## 2024-11-20 PROCEDURE — 85730 THROMBOPLASTIN TIME PARTIAL: CPT

## 2024-11-21 DIAGNOSIS — G89.29 CHRONIC PAIN OF LEFT KNEE: ICD-10-CM

## 2024-11-21 DIAGNOSIS — M25.562 CHRONIC PAIN OF LEFT KNEE: ICD-10-CM

## 2024-11-21 RX ORDER — DIAZEPAM 5 MG/1
5 TABLET ORAL 2 TIMES DAILY PRN
Qty: 60 TABLET | Refills: 0 | Status: SHIPPED | OUTPATIENT
Start: 2024-11-21 | End: 2024-12-21

## 2024-11-21 RX ORDER — HYDROCODONE BITARTRATE AND ACETAMINOPHEN 7.5; 325 MG/1; MG/1
1 TABLET ORAL 3 TIMES DAILY PRN
Qty: 90 TABLET | Refills: 0 | Status: SHIPPED | OUTPATIENT
Start: 2024-11-21

## 2024-11-21 NOTE — TELEPHONE ENCOUNTER
Hi, on the Norco, I wanted to get the lower dose, I think it’s 5?  Not the 7.5, can that be changed?

## 2024-11-22 ENCOUNTER — TELEPHONE (OUTPATIENT)
Dept: PRIMARY CARE | Facility: CLINIC | Age: 59
End: 2024-11-22
Payer: COMMERCIAL

## 2024-11-22 RX ORDER — HYDROCODONE BITARTRATE AND ACETAMINOPHEN 5; 325 MG/1; MG/1
1 TABLET ORAL EVERY 6 HOURS PRN
Qty: 120 TABLET | Refills: 0 | Status: SHIPPED | OUTPATIENT
Start: 2024-11-22 | End: 2024-12-22

## 2024-11-22 NOTE — TELEPHONE ENCOUNTER
Linda from [address] in [place] [employer] on [address] is calling for clarification on her Hydrocodone 5-325 mg that you sent over today.  She states there was a RX for hydrocodone 7.5-325 mg that was sent over yesterday and then on the bottom of today's RX it says: disregard the RX for the 7.5-325 mg. Just wanted to clarify that is correct.    Also, she got  a RX from a different doctor, Dr Hancock on 11/7/24 for hydrocodone 7.5 -325 mg for a 30 day supply. Is she supposed to finish up with that one before she picks up the one you prescribed today? Or?  Please clarify.    Thanks    [employer] # [phone]      PLEASE SEND BACK TO POOL  PLEASE SEND BACK TO POOL

## 2024-11-25 ENCOUNTER — TELEPHONE (OUTPATIENT)
Dept: PRIMARY CARE | Facility: CLINIC | Age: 59
End: 2024-11-25
Payer: COMMERCIAL

## 2024-11-25 NOTE — TELEPHONE ENCOUNTER
"Festus Menjivar \"Nadia\"  You50 minutes ago (9:54 AM)       They called the office and since I still had some 7.5 they wouldn’t fill the 5’s?  It was weird cause they told me the script was ready.  I’m not sure why but, but the 7.5 are making me a little nauseous, but I see what I did again, I was taking one every 6 hrs instead of one every eight.  So I have gone through them faster than I should have.  I am going out of town for the holiday and will run out.  Can I get either 30 or 60 of the 5’s?  If not I totally understand.  It was my fault.    "

## 2024-11-26 ENCOUNTER — OFFICE VISIT (OUTPATIENT)
Dept: ORTHOPEDIC SURGERY | Facility: CLINIC | Age: 59
End: 2024-11-26
Payer: COMMERCIAL

## 2024-11-26 ENCOUNTER — PATIENT MESSAGE (OUTPATIENT)
Dept: ORTHOPEDIC SURGERY | Facility: CLINIC | Age: 59
End: 2024-11-26

## 2024-11-26 DIAGNOSIS — M17.11 PRIMARY OSTEOARTHRITIS OF RIGHT KNEE: ICD-10-CM

## 2024-11-26 DIAGNOSIS — M17.11 PRIMARY OSTEOARTHRITIS OF RIGHT KNEE: Primary | ICD-10-CM

## 2024-11-26 PROCEDURE — L1830 KO IMMOB CANVAS LONG PRE OTS: HCPCS | Performed by: PHYSICIAN ASSISTANT

## 2024-11-26 PROCEDURE — 99211 OFF/OP EST MAY X REQ PHY/QHP: CPT | Performed by: PHYSICIAN ASSISTANT

## 2024-11-26 RX ORDER — DOCUSATE SODIUM 100 MG/1
100 CAPSULE, LIQUID FILLED ORAL 2 TIMES DAILY PRN
Qty: 60 CAPSULE | Refills: 0 | Status: SHIPPED | OUTPATIENT
Start: 2024-11-26

## 2024-11-26 RX ORDER — OXYCODONE HYDROCHLORIDE 5 MG/1
5 TABLET ORAL EVERY 6 HOURS PRN
Qty: 28 TABLET | Refills: 0 | Status: CANCELLED | OUTPATIENT
Start: 2024-11-26 | End: 2024-12-03

## 2024-11-26 RX ORDER — ASPIRIN 81 MG/1
81 TABLET ORAL 2 TIMES DAILY
Qty: 60 TABLET | Refills: 0 | Status: SHIPPED | OUTPATIENT
Start: 2024-11-26 | End: 2024-12-26

## 2024-11-26 RX ORDER — ONDANSETRON 4 MG/1
4 TABLET, FILM COATED ORAL EVERY 8 HOURS PRN
Qty: 20 TABLET | Refills: 0 | Status: SHIPPED | OUTPATIENT
Start: 2024-11-26 | End: 2024-12-03

## 2024-11-26 RX ORDER — HYDROCODONE BITARTRATE AND ACETAMINOPHEN 7.5; 325 MG/1; MG/1
1 TABLET ORAL EVERY 6 HOURS PRN
Qty: 28 TABLET | Refills: 0 | Status: SHIPPED | OUTPATIENT
Start: 2024-11-26 | End: 2024-12-03

## 2024-11-26 RX ORDER — ACETAMINOPHEN 325 MG/1
650 TABLET ORAL EVERY 6 HOURS PRN
Qty: 42 TABLET | Refills: 0 | Status: SHIPPED | OUTPATIENT
Start: 2024-11-26

## 2024-11-26 RX ORDER — CHLORHEXIDINE GLUCONATE 40 MG/ML
SOLUTION TOPICAL
Qty: 473 ML | Refills: 0 | Status: CANCELLED | OUTPATIENT
Start: 2024-11-26

## 2024-11-26 RX ORDER — CHLORHEXIDINE GLUCONATE ORAL RINSE 1.2 MG/ML
SOLUTION DENTAL
Qty: 30 ML | Refills: 0 | Status: SHIPPED | OUTPATIENT
Start: 2024-11-26

## 2024-11-26 RX ORDER — CYCLOBENZAPRINE HCL 10 MG
10 TABLET ORAL 3 TIMES DAILY PRN
Qty: 21 TABLET | Refills: 0 | Status: SHIPPED | OUTPATIENT
Start: 2024-11-26 | End: 2024-12-03

## 2024-11-26 NOTE — PROGRESS NOTES
"  Subjective    Patient ID: Festus \"Nadia\"    Chief Complaint:   Chief Complaint   Patient presents with    Right Knee - Preop Visit     RT JOVANY TKR 12/2 at [facility]     This patient has pain in the knee that is worsening.  The pain increases with activity and with weightbearing, and interferes with daily activities.  There is pain with range of motion, limited range of motion, crepitus and swelling.  The x-ray demonstrates joint space narrowing, osteophyte formation, and subchondral sclerosis.  The symptoms have worsened in spite of extensive medical treatment, therapy, and external support over at least the past 3 months.     This is the preop visit to discuss the risks and benefits of the total knee replacement surgery.  These risks were fully explained to the patient.  With this, and any surgery, infection is a risk, this is usually 1 to 2%, even higher in diabetics, persons with rheumatoid arthritis, previous surgeries, on oral steroid medications, and obesity.  All of these issues were properly addressed.  We always assure all sterile techniques will be followed.  Patient will also need to be on antibiotics for the next 2 years for any minor surgery, even dental work.  For high risk patients, this will be for lifetime.  Severe infections may require removal of the prosthesis.     It was also explained to the patient that there will be some minor blood loss during the procedure and a blood transfusion may be recommended if medically necessary.  It is also noted that with knee surgery a tourniquet is applied to the lower extremity to limit blood loss during the procedure.     Pulmonary embolism and blood clots are also discussed with the patient.  We discussed that these can be fatal complications to the surgery.  It is explained to the patient that the use of thromboembolic stockings, foot pumps, incentive spirometer's, early mobility, and the use of blood thinners for period of time is the standard of care for " prevention of blood clots.     Patient's preoperative range of motion is 0-120 degrees.  It is explained to the patient that this type of surgery is to decrease arthritis pain and sometimes stiffness may occur.  It is important that the patient go to physical therapy postoperatively.  It is also stated that occasionally we will have to manipulate the knee if pain and stiffness persists.     Loosening and wear of the prosthesis are also discussed.  The prosthesis normally last between 12 and 15 years.  Revisions may be more complicated and with higher risk.     Fractures, though rare, may also occur intraoperatively.  These fractures may be to the tibia or to the femur.  There may be nerve or arterial injuries as well and these are discussed in detail.     Lastly, the benefits of spinal anesthesia were explained to the patient.     All of the patient's questions and concerns were answered.     This note was prepared using voice recognition software.  The details of this note are correct and have been reviewed, and corrected to the best of my ability.  Some grammatical areas may persist related to the Dragon software     Carter Sainz PA-C     [phone]

## 2024-12-01 DIAGNOSIS — J45.909 ASTHMA, UNSPECIFIED ASTHMA SEVERITY, UNSPECIFIED WHETHER COMPLICATED, UNSPECIFIED WHETHER PERSISTENT (HHS-HCC): ICD-10-CM

## 2024-12-02 PROCEDURE — 27447 TOTAL KNEE ARTHROPLASTY: CPT | Performed by: PHYSICIAN ASSISTANT

## 2024-12-02 PROCEDURE — 27447 TOTAL KNEE ARTHROPLASTY: CPT | Performed by: ORTHOPAEDIC SURGERY

## 2024-12-03 RX ORDER — FLUTICASONE PROPIONATE AND SALMETEROL 250; 50 UG/1; UG/1
1 POWDER RESPIRATORY (INHALATION) 2 TIMES DAILY
Qty: 60 EACH | Refills: 2 | Status: SHIPPED | OUTPATIENT
Start: 2024-12-03

## 2024-12-04 ENCOUNTER — TELEPHONE (OUTPATIENT)
Dept: ORTHOPEDIC SURGERY | Facility: CLINIC | Age: 59
End: 2024-12-04
Payer: COMMERCIAL

## 2024-12-04 RX ORDER — HYDROCODONE BITARTRATE AND ACETAMINOPHEN 7.5; 325 MG/1; MG/1
1 TABLET ORAL EVERY 6 HOURS PRN
Qty: 28 TABLET | Refills: 0 | Status: SHIPPED | OUTPATIENT
Start: 2024-12-04 | End: 2024-12-11

## 2024-12-04 NOTE — TELEPHONE ENCOUNTER
Patient LVM requesting a refill on her pain medication. She is s/p Lt. TKA 04/29/24. Pharmacy on file is correct

## 2024-12-10 ENCOUNTER — PATIENT MESSAGE (OUTPATIENT)
Dept: ORTHOPEDIC SURGERY | Facility: CLINIC | Age: 59
End: 2024-12-10
Payer: COMMERCIAL

## 2024-12-10 DIAGNOSIS — M17.11 PRIMARY OSTEOARTHRITIS OF RIGHT KNEE: ICD-10-CM

## 2024-12-10 DIAGNOSIS — Z96.652 S/P TOTAL KNEE ARTHROPLASTY, LEFT: ICD-10-CM

## 2024-12-10 RX ORDER — HYDROCODONE BITARTRATE AND ACETAMINOPHEN 7.5; 325 MG/1; MG/1
1 TABLET ORAL EVERY 6 HOURS PRN
Qty: 28 TABLET | Refills: 0 | Status: SHIPPED | OUTPATIENT
Start: 2024-12-10 | End: 2024-12-17

## 2024-12-10 RX ORDER — CYCLOBENZAPRINE HCL 10 MG
10 TABLET ORAL 3 TIMES DAILY PRN
Qty: 21 TABLET | Refills: 0 | Status: SHIPPED | OUTPATIENT
Start: 2024-12-10 | End: 2024-12-17

## 2024-12-17 ENCOUNTER — HOSPITAL ENCOUNTER (OUTPATIENT)
Dept: RADIOLOGY | Facility: CLINIC | Age: 59
Discharge: HOME | End: 2024-12-17
Payer: COMMERCIAL

## 2024-12-17 ENCOUNTER — TELEPHONE (OUTPATIENT)
Dept: PRIMARY CARE | Facility: CLINIC | Age: 59
End: 2024-12-17

## 2024-12-17 ENCOUNTER — OFFICE VISIT (OUTPATIENT)
Dept: ORTHOPEDIC SURGERY | Facility: CLINIC | Age: 59
End: 2024-12-17
Payer: COMMERCIAL

## 2024-12-17 DIAGNOSIS — M17.11 PRIMARY OSTEOARTHRITIS OF RIGHT KNEE: ICD-10-CM

## 2024-12-17 DIAGNOSIS — M25.561 RIGHT KNEE PAIN, UNSPECIFIED CHRONICITY: ICD-10-CM

## 2024-12-17 DIAGNOSIS — Z47.1 AFTERCARE FOLLOWING RIGHT KNEE JOINT REPLACEMENT SURGERY: Primary | ICD-10-CM

## 2024-12-17 DIAGNOSIS — Z96.651 AFTERCARE FOLLOWING RIGHT KNEE JOINT REPLACEMENT SURGERY: Primary | ICD-10-CM

## 2024-12-17 PROCEDURE — 99211 OFF/OP EST MAY X REQ PHY/QHP: CPT | Performed by: ORTHOPAEDIC SURGERY

## 2024-12-17 PROCEDURE — 73562 X-RAY EXAM OF KNEE 3: CPT | Mod: RT

## 2024-12-17 PROCEDURE — 73562 X-RAY EXAM OF KNEE 3: CPT | Mod: RIGHT SIDE | Performed by: ORTHOPAEDIC SURGERY

## 2024-12-17 RX ORDER — HYDROCODONE BITARTRATE AND ACETAMINOPHEN 7.5; 325 MG/1; MG/1
1 TABLET ORAL EVERY 6 HOURS PRN
Qty: 28 TABLET | Refills: 0 | Status: SHIPPED | OUTPATIENT
Start: 2024-12-17 | End: 2024-12-20 | Stop reason: SDUPTHER

## 2024-12-17 NOTE — TELEPHONE ENCOUNTER
"Festus Menjivar \"Nadia\"  P Do [id number] [facility] Clinical Support Staff (supporting Uvaldo Soria MD)23 hours ago (5:30 PM)       Can I please get a refill on this medication?  Thank you      Per Dr. Soria -  patient is not to get anymore patient medication from us. She had her surgeries.     LMOM FOR PATIENT TO CALL THE OFFICE BACK.     PATIENT CALLED THE OFFICE BACK AND IS AWARE OF DR. SORIA'S MESSAGE. STATES THAT SHE SENT THE MESSAGE TO THE WRONG OFFICE.   " Hospitalist

## 2024-12-18 ENCOUNTER — PATIENT MESSAGE (OUTPATIENT)
Dept: ORTHOPEDIC SURGERY | Facility: CLINIC | Age: 59
End: 2024-12-18
Payer: COMMERCIAL

## 2024-12-18 DIAGNOSIS — Z47.1 AFTERCARE FOLLOWING RIGHT KNEE JOINT REPLACEMENT SURGERY: ICD-10-CM

## 2024-12-18 DIAGNOSIS — Z96.651 AFTERCARE FOLLOWING RIGHT KNEE JOINT REPLACEMENT SURGERY: ICD-10-CM

## 2024-12-18 NOTE — PROGRESS NOTES
History of present illness    59-year-old female first postop visit total knee replacement right.  Her surgery was December 2.  She states it feels a little more painful and a little slower recovery than her left knee but she is making progress she is ambulating with a walker she is doing home physical therapy due to transition to outpatient.      Past medical , Surgical, Family and social history reviewed.      Physical exam  General: No acute distress and breathing comfortably.  Patient is pleasant and cooperative with the examination.    Extremity  Incision well-approximated no sign of infection lacks about 5 degrees of full extension flexion to 85 no instability brisk cap refill compartments soft calf is nontender    Diagnostics      XR knee right 3 views    Result Date: 12/17/2024  Interpreted By:  Jagruti Murphy, STUDY: XR KNEE RIGHT 3 VIEWS; 12/17/2024 3:14 pm   INDICATION: Signs/Symptoms:pain.   ACCESSION NUMBER(S): [id number]   ORDERING CLINICIAN: JAGRUTI MURPHY   FINDINGS: Three views of the knee show status post joint replacement in good alignment.  There are no signs of fracture or dislocation.  No other bony abnormalities       Signed by: Jagruti Murphy 12/17/2024 4:57 PM Dictation workstation:   [id number]       Procedure  [ none]    Assessment  Status post total knee replacement right    Treatment plan  1.  Continue work on range of motion strengthening.  Continue with her physical therapy.  Prescription for [place] sent to pharmacy.  Follow-up 3 weeks.  2. [   ]  3. [   ]  4.  All of the patient's questions were answered.    Orders Placed This Encounter    XR knee right 3 views    HYDROcodone-acetaminophen (Norco) 7.5-325 mg tablet       This note was prepared using voice recognition software.  The details of this note are correct and have been reviewed, and corrected to the best of my ability.  Some grammatical areas may persist related to the Dragon  software    Darrion Murphy MD  Senior Attending Physician  [facility] [place]  [facility] [place]    [phone]

## 2024-12-20 RX ORDER — NABUMETONE 500 MG/1
500 TABLET, FILM COATED ORAL 2 TIMES DAILY
Qty: 60 TABLET | Refills: 0 | Status: SHIPPED | OUTPATIENT
Start: 2024-12-20 | End: 2025-01-19

## 2024-12-20 RX ORDER — HYDROCODONE BITARTRATE AND ACETAMINOPHEN 7.5; 325 MG/1; MG/1
1 TABLET ORAL EVERY 6 HOURS PRN
Qty: 28 TABLET | Refills: 0 | Status: SHIPPED | OUTPATIENT
Start: 2024-12-24 | End: 2024-12-31

## 2024-12-23 ENCOUNTER — EVALUATION (OUTPATIENT)
Dept: PHYSICAL THERAPY | Facility: CLINIC | Age: 59
End: 2024-12-23
Payer: COMMERCIAL

## 2024-12-23 ENCOUNTER — TELEPHONE (OUTPATIENT)
Dept: ORTHOPEDIC SURGERY | Facility: CLINIC | Age: 59
End: 2024-12-23

## 2024-12-23 DIAGNOSIS — F41.9 ANXIETY: ICD-10-CM

## 2024-12-23 DIAGNOSIS — Z47.1 AFTERCARE FOLLOWING RIGHT KNEE JOINT REPLACEMENT SURGERY: ICD-10-CM

## 2024-12-23 DIAGNOSIS — Z96.651 AFTERCARE FOLLOWING RIGHT KNEE JOINT REPLACEMENT SURGERY: Primary | ICD-10-CM

## 2024-12-23 DIAGNOSIS — M25.561 RIGHT KNEE PAIN, UNSPECIFIED CHRONICITY: Primary | ICD-10-CM

## 2024-12-23 DIAGNOSIS — M25.661 DECREASED RANGE OF MOTION OF RIGHT KNEE: ICD-10-CM

## 2024-12-23 DIAGNOSIS — Z47.1 AFTERCARE FOLLOWING RIGHT KNEE JOINT REPLACEMENT SURGERY: Primary | ICD-10-CM

## 2024-12-23 DIAGNOSIS — J45.909 ASTHMA, UNSPECIFIED ASTHMA SEVERITY, UNSPECIFIED WHETHER COMPLICATED, UNSPECIFIED WHETHER PERSISTENT (HHS-HCC): ICD-10-CM

## 2024-12-23 DIAGNOSIS — Z96.651 AFTERCARE FOLLOWING RIGHT KNEE JOINT REPLACEMENT SURGERY: ICD-10-CM

## 2024-12-23 PROCEDURE — 97161 PT EVAL LOW COMPLEX 20 MIN: CPT | Mod: GP

## 2024-12-23 PROCEDURE — 97110 THERAPEUTIC EXERCISES: CPT | Mod: GP

## 2024-12-23 RX ORDER — FLUTICASONE PROPIONATE AND SALMETEROL 250; 50 UG/1; UG/1
1 POWDER RESPIRATORY (INHALATION) 2 TIMES DAILY
Qty: 60 EACH | Refills: 2 | Status: SHIPPED | OUTPATIENT
Start: 2024-12-23

## 2024-12-23 RX ORDER — ALBUTEROL SULFATE 90 UG/1
INHALANT RESPIRATORY (INHALATION)
Qty: 8.5 G | Refills: 2 | Status: SHIPPED | OUTPATIENT
Start: 2024-12-23

## 2024-12-23 RX ORDER — DIAZEPAM 5 MG/1
5 TABLET ORAL 2 TIMES DAILY PRN
Qty: 60 TABLET | Refills: 0 | Status: SHIPPED | OUTPATIENT
Start: 2024-12-23 | End: 2025-01-22

## 2024-12-23 ASSESSMENT — ENCOUNTER SYMPTOMS
OCCASIONAL FEELINGS OF UNSTEADINESS: 0
LOSS OF SENSATION IN FEET: 0
DEPRESSION: 0

## 2024-12-23 NOTE — PROGRESS NOTES
"Physical Therapy Evaluation    Patient Name: Festus Menjivar \"Caryn"  MRN: [id number]  Today's Date: 12/23/2024  Time Calculation  Start Time: 1235  Stop Time: 1320  Time Calculation (min): 45 min  PT Evaluation Time Entry  PT Evaluation (Low) Time Entry: 20  PT Therapeutic Procedures Time Entry  Therapeutic Exercise Time Entry: 25        Insurance: [facility] [place] [employer] [place]  No authorization required by insurance after initial evaluation  Primary diagnosis: R knee pain  Visit # 1      Assessment   Festus Menjivar \"Caryn" is a 59 y.o. who presents s/p R TKA 12/02/2024 with pain located anterior R knee, distal quadriceps, lateral knee and inferiorly along tibia. Patient demonstrates decreased R knee AROM, decreased force production R lower extremity, as well as antalgic gait pattern Independently. At this time, patient is limited with walking, transitional movements, and return to work duties. Patient would benefit from PT interventions to address the stated impairments, improve functional mobility, and establish HEP post-operatively.      Plan   Treatment/Interventions: Cryotherapy, Education/ Instruction, Hot pack, Manual therapy, Neuromuscular re-education, Therapeutic activities, Therapeutic exercises, Gait Training  PT Plan: Skilled PT  PT Frequency: 1-2 time per week  Duration: 8 weeks  Rehab Potential: Good  Plan of Care Agreement: Patient      The physical therapy prognosis is Good for the patient to achieve their goals. The patient tolerated therapy treatment today well with no adverse effects. Clinical presentation: stable. Level of clinical decision making is low.  Personal factors that impact therapy include: L TKA 04/2024    Current Problem  1. Right knee pain, unspecified chronicity  Follow Up In Physical Therapy      2. Decreased range of motion of right knee  Follow Up In Physical Therapy      3. Aftercare following right knee joint replacement surgery  Follow Up In Physical Therapy        Problem List " Items Addressed This Visit             ICD-10-CM    Right knee pain - Primary M25.561    Relevant Orders    Follow Up In Physical Therapy    Decreased range of motion of right knee M25.661    Relevant Orders    Follow Up In Physical Therapy    Aftercare following right knee joint replacement surgery Z47.1, Z96.651    Relevant Orders    Follow Up In Physical Therapy       General:  General  Reason for Referral: s/p R TKA 12/02/2024  Referred By: Dr. Darrion Murphy  Precautions:  Precautions  STEADI Fall Risk Score (The score of 4 or more indicates an increased risk of falling): 4  Per STEADI fall risk screen and reported symptoms, recommend use of an assistive device at all times to assist with management of symptoms and safety with all functional mobility.     Subjective:  Chief complaints: s/p R TKA 12/02/2024. No complications post-operatively. Home with home PT post-operatively, which ended last week.   Onset Date: 12/02/2024  Mechanism of Injury: chronic knee pain  Previous History: L TKA 04/2024  Personal Factors that may impact care: chronic symptoms prior to surgery   Patient is cleared for physical therapy services by physician referral.    Pain:  Current: 6/10 Best: 3/10 Worst: 10/10   Location: R anterior superior, lateral knee; tibia   Type: tightness   Aggravators: morning and night, moving the wrong way   Alleviators: sit still, ice, elevation, pain medication   Numbness/tingling: lateral knees    Function:   Work/Recreation: ; not currently working   Prior Level: chronic knee pain; Indep   Current limitations: walking, getting on the floor   Condition: Unchanged     Home Situation:    Stairs: 0   Lives with alone    Sleep:  Preferred position(s): sidelying with pillow between the knees     Goals for Therapy:    Less to no pain, mobility    Objective   Gait: Indep ambulation, antalgic gait pattern with decreased WB R LE, decreased heel strike and toe off  Posture: decreased WB R LE in  standing  Palpation: (+) TTP R superior knee, distal quadriceps, along tibia  (-) drainage, redness along R healing incisional scar   Sensation: intact to light touch except diminished bilateral lateral knee    Hip AROM:  Flexion: 120 deg    R knee Knee AROM:  Flexion: 90 deg, PROM 98 deg  Extension: -10 deg, PROM -5 deg    Hip Strength:  Flexion: 3/5  Abduction: 3/5  Adduction: 3/5  Extension: 3/5    Knee Strength:  Flexion: 3-/5  Extension: 3-/5    Ankle & Foot Strength:  Dorsiflexion: 3/5    Outcome Measures:  Lower Extremity Functional Scale (LEFS): 31    Treatment:  Therapeutic Exercise: Education edema management, education on modification or discontinuation of any exercise if adverse reaction arises.  - Supine Heel Slide Passive with Strap 2 sets - 10 reps  - Long Sitting Calf Stretch with Strap 3 reps sets - 30 seconds hold  - Supine Quadricep Sets with Propped Knee Extension 2 sets - 10 reps  - Supine Quadriceps Stretch with Strap on Table 3 reps - 30 seconds hold  - Seated Long Arc Quad 2 sets - 10 reps  - Seated Hamstring Stretch 3 reps - 30 seconds hold  - Seated Gastroc Stretch with Strap 3 reps - 30 seconds hold  - Scifit stepper x 5 mins ROM  - Overground ambulation focusing on heel strike and toe off gait pattern Independently    OP Education: verbal, demonstration, HEP handout    Access Code: [email or web address]  URL: [email or web address]  Date: 12/23/2024  Prepared by: Alyssa    Exercises  - Supine Heel Slide Passive with Strap  - 1 x daily - 7 x weekly - 2 sets - 10 reps  - Propped Knee Extension  - 1 x daily - 7 x weekly - 2 sets - 10 reps  - Long Sitting Calf Stretch with Strap  - 1 x daily - 7 x weekly - 2 sets - 10 reps  - Supine Quadricep Sets  - 1 x daily - 7 x weekly - 2 sets - 10 reps  - Supine Quadriceps Stretch with Strap on Table  - 1 x daily - 7 x weekly - 3 reps - 30 seconds hold  - Seated Long Arc Quad  - 1 x daily - 7 x weekly - 2 sets - 10 reps  - Seated Hamstring  Stretch  - 1 x daily - 7 x weekly - 3 reps - 30 seconds hold  - Seated Gastroc Stretch with Strap  - 1 x daily - 7 x weekly - 3 reps - 30 seconds hold    Goals:  Active       PT Problem       Patient will reciprocally negotiate stairs with handrail support Independently       Start:  12/23/24    Expected End:  03/23/25            Patient will walk, transfer without pain on average, pain no greater than 4/10 at worst to increase ease with home and work duties       Start:  12/23/24    Expected End:  03/23/25            Patient will improve R knee AROM 0->110 deg to increase ease with daily functional mobility       Start:  12/23/24    Expected End:  03/23/25            Patient will improve R knee strength to >=4+/5 to increase ease with transfers and stair negotiation       Start:  12/23/24    Expected End:  03/23/25            Patient will demonstrate independence and compliance with HEP and self management       Start:  12/23/24    Expected End:  03/23/25            Patient will improve LEFS score by 9 points to meet minimal detectable change of improvement to demonstrate increased functional mobility        Start:  12/23/24    Expected End:  03/23/25

## 2024-12-23 NOTE — TELEPHONE ENCOUNTER
----- Message from Priti ALFARO sent at 12/23/2024  4:00 PM EST -----  Regarding: new PT order  Martina called and said she needed new PT order put in the system for patient. Said if you have any questions to please call [phone].

## 2024-12-24 ENCOUNTER — APPOINTMENT (OUTPATIENT)
Dept: PHYSICAL THERAPY | Facility: CLINIC | Age: 59
End: 2024-12-24
Payer: COMMERCIAL

## 2024-12-26 ENCOUNTER — TREATMENT (OUTPATIENT)
Dept: PHYSICAL THERAPY | Facility: CLINIC | Age: 59
End: 2024-12-26
Payer: COMMERCIAL

## 2024-12-26 DIAGNOSIS — Z96.651 AFTERCARE FOLLOWING RIGHT KNEE JOINT REPLACEMENT SURGERY: ICD-10-CM

## 2024-12-26 DIAGNOSIS — M25.561 RIGHT KNEE PAIN, UNSPECIFIED CHRONICITY: ICD-10-CM

## 2024-12-26 DIAGNOSIS — M25.661 DECREASED RANGE OF MOTION OF RIGHT KNEE: ICD-10-CM

## 2024-12-26 DIAGNOSIS — Z47.1 AFTERCARE FOLLOWING RIGHT KNEE JOINT REPLACEMENT SURGERY: ICD-10-CM

## 2024-12-26 PROCEDURE — 97110 THERAPEUTIC EXERCISES: CPT | Mod: GP | Performed by: PHYSICAL THERAPIST

## 2024-12-26 ASSESSMENT — PAIN - FUNCTIONAL ASSESSMENT: PAIN_FUNCTIONAL_ASSESSMENT: 0-10

## 2024-12-26 ASSESSMENT — PAIN SCALES - GENERAL: PAINLEVEL_OUTOF10: 4

## 2024-12-26 NOTE — PROGRESS NOTES
"Physical Therapy    Physical Therapy Treatment    Patient Name: Festus Menjivar  MRN: [id number]  : 1965   Darrion Murphy  Today's Date: 2024  Time Calculation  Start Time: 1245  Stop Time: 1330  Time Calculation (min): 45 min  PT Therapeutic Procedures Time Entry  Therapeutic Exercise Time Entry: 45           General  Reason for Referral: s/p R TKA 2024  Referred By: Dr. Darrion Murphy  Visit #2     Current Problem  Problem List Items Addressed This Visit             ICD-10-CM    Right knee pain M25.561    Decreased range of motion of right knee M25.661    Aftercare following right knee joint replacement surgery Z47.1, Z96.651            Subjective   Pt. Report 4/10 right knee pain   Pt.'s name and  were confirmed this date.    Pt. Reports compliance with HEP.    Precautions  Precautions  STEADI Fall Risk Score (The score of 4 or more indicates an increased risk of falling): 4    Pain  Pain Assessment: 0-10  0-10 (Numeric) Pain Score: 4    Objective      PROM right knee   0-107*    Pt's name and  were confirmed today.        Treatments:    - Seated Long Arc Quad 2 sets - 2x10 reps  - Scifit stepper x 5 mins ROM  Shuttle DLP 4B 2x10   SLP 2B x10  Amb with heel to toe gait pattern  Supine Heel Slide Passive with Strap  10\"x5  Quad sets with heel propped 5\"x10  Tilt board calf stretch 30\" x3  Seated right hs stretch 30\" x3      Patient Education:  Access Code: [email or web address]  URL: [email or web address]  Date: 2024  Prepared by: Alyssa    Exercises  - Supine Heel Slide Passive with Strap  - 1 x daily - 7 x weekly - 2 sets - 10 reps  - Propped Knee Extension  - 1 x daily - 7 x weekly - 2 sets - 10 reps  - Long Sitting Calf Stretch with Strap  - 1 x daily - 7 x weekly - 2 sets - 10 reps  - Supine Quadricep Sets  - 1 x daily - 7 x weekly - 2 sets - 10 reps  - Supine Quadriceps Stretch with Strap on Table  - 1 x daily - 7 x weekly - 3 reps - 30 seconds hold  - " Seated Long Arc Quad  - 1 x daily - 7 x weekly - 2 sets - 10 reps  - Seated Hamstring Stretch  - 1 x daily - 7 x weekly - 3 reps - 30 seconds hold  - Seated Gastroc Stretch with Strap  - 1 x daily - 7 x weekly - 3 reps - 30 seconds hold  Assessment:   Pt. Amb with decreased right knee extension during stance phase.  This is greatly improved with VC's for heel strike.  ROM improving well    Plan:   Continue to improve strength, ROM and gait    Goals:  Active       PT Problem       Patient will reciprocally negotiate stairs with handrail support Independently       Start:  12/23/24    Expected End:  03/23/25            Patient will walk, transfer without pain on average, pain no greater than 4/10 at worst to increase ease with home and work duties       Start:  12/23/24    Expected End:  03/23/25            Patient will improve R knee AROM 0->110 deg to increase ease with daily functional mobility       Start:  12/23/24    Expected End:  03/23/25            Patient will improve R knee strength to >=4+/5 to increase ease with transfers and stair negotiation       Start:  12/23/24    Expected End:  03/23/25            Patient will demonstrate independence and compliance with HEP and self management       Start:  12/23/24    Expected End:  03/23/25            Patient will improve LEFS score by 9 points to meet minimal detectable change of improvement to demonstrate increased functional mobility        Start:  12/23/24    Expected End:  03/23/25

## 2024-12-30 DIAGNOSIS — Z96.651 AFTERCARE FOLLOWING RIGHT KNEE JOINT REPLACEMENT SURGERY: ICD-10-CM

## 2024-12-30 DIAGNOSIS — Z47.1 AFTERCARE FOLLOWING RIGHT KNEE JOINT REPLACEMENT SURGERY: ICD-10-CM

## 2024-12-30 RX ORDER — HYDROCODONE BITARTRATE AND ACETAMINOPHEN 7.5; 325 MG/1; MG/1
1 TABLET ORAL EVERY 6 HOURS PRN
Qty: 28 TABLET | Refills: 0 | Status: CANCELLED | OUTPATIENT
Start: 2024-12-30 | End: 2025-01-06

## 2024-12-31 ENCOUNTER — DOCUMENTATION (OUTPATIENT)
Dept: PHYSICAL THERAPY | Facility: CLINIC | Age: 59
End: 2024-12-31
Payer: COMMERCIAL

## 2024-12-31 ENCOUNTER — APPOINTMENT (OUTPATIENT)
Dept: PHYSICAL THERAPY | Facility: CLINIC | Age: 59
End: 2024-12-31
Payer: COMMERCIAL

## 2024-12-31 RX ORDER — HYDROCODONE BITARTRATE AND ACETAMINOPHEN 7.5; 325 MG/1; MG/1
1 TABLET ORAL EVERY 6 HOURS PRN
Qty: 28 TABLET | Refills: 0 | Status: SHIPPED | OUTPATIENT
Start: 2024-12-31 | End: 2025-01-07

## 2024-12-31 NOTE — PROGRESS NOTES
"Physical Therapy    Physical Therapy Treatment    Patient Name: Festus Menjivar \"Nadia\"  MRN: [id number]  Today's Date: 12/31/2024                 Insurance: [employer] [place] [employer] [place]  No authorization required by insurance after initial evaluation  Primary diagnosis: R knee pain  Visit # 3      Assessment:  Patient returns for first follow up visit.      Plan:      Current Problem  No diagnosis found.  Problem List Items Addressed This Visit    None      General   **s/p R TKA 12/02/2024    Precautions:     Vital Signs:        Subjective      Pain       Objective       Treatments:      OP EDUCATION: verbal, demonstration, HEP handout       Goals:  Active       PT Problem       Patient will reciprocally negotiate stairs with handrail support Independently       Start:  12/23/24    Expected End:  03/23/25            Patient will walk, transfer without pain on average, pain no greater than 4/10 at worst to increase ease with home and work duties       Start:  12/23/24    Expected End:  03/23/25            Patient will improve R knee AROM 0->110 deg to increase ease with daily functional mobility       Start:  12/23/24    Expected End:  03/23/25            Patient will improve R knee strength to >=4+/5 to increase ease with transfers and stair negotiation       Start:  12/23/24    Expected End:  03/23/25            Patient will demonstrate independence and compliance with HEP and self management       Start:  12/23/24    Expected End:  03/23/25            Patient will improve LEFS score by 9 points to meet minimal detectable change of improvement to demonstrate increased functional mobility        Start:  12/23/24    Expected End:  03/23/25                "

## 2024-12-31 NOTE — PROGRESS NOTES
"Physical Therapy                 Therapy Communication Note    Patient Name: Festus Menjivar \"Nadia\"  MRN: [id number]  Today's Date: 12/31/2024     Discipline: Physical Therapy      Missed Visit Reason:  sick/stomach virus    Missed Time: Cancel    Comment:  "

## 2025-01-02 ENCOUNTER — TREATMENT (OUTPATIENT)
Dept: PHYSICAL THERAPY | Facility: CLINIC | Age: 60
End: 2025-01-02
Payer: COMMERCIAL

## 2025-01-02 DIAGNOSIS — Z96.651 AFTERCARE FOLLOWING RIGHT KNEE JOINT REPLACEMENT SURGERY: ICD-10-CM

## 2025-01-02 DIAGNOSIS — M25.561 RIGHT KNEE PAIN, UNSPECIFIED CHRONICITY: ICD-10-CM

## 2025-01-02 DIAGNOSIS — Z47.1 AFTERCARE FOLLOWING RIGHT KNEE JOINT REPLACEMENT SURGERY: ICD-10-CM

## 2025-01-02 DIAGNOSIS — M25.661 DECREASED RANGE OF MOTION OF RIGHT KNEE: ICD-10-CM

## 2025-01-02 PROCEDURE — 97110 THERAPEUTIC EXERCISES: CPT | Mod: GP

## 2025-01-02 NOTE — PROGRESS NOTES
"Physical Therapy    Physical Therapy Treatment    Patient Name: Festus Menjivar \"Nadia\"  MRN: [id number]  Today's Date: 1/2/2025  Time Calculation  Start Time: 1252  Stop Time: 1334  Time Calculation (min): 42 min     PT Therapeutic Procedures Time Entry  Therapeutic Exercise Time Entry: 42        Insurance: [employer] [place] [employer] [place]  No authorization required by insurance after initial evaluation  Primary diagnosis: R knee pain  Visit # 3      Assessment:  Cues to reduce compensatory hip hike with knee flexion during standing and seated therapeutic exercises. Progressed with mobility and strengthening to patients tolerance. Improved gait pattern with patient focus on gait mechanics. Will need to ensure carrying over of comparable weightbearing in standing at next visit, as patient with tendency to shift weight away from R LE.    Plan:  Continue with PT POC to improve R knee ROM, strength, functional mobility and gait post-operatively.       Current Problem  1. Right knee pain, unspecified chronicity  Follow Up In Physical Therapy      2. Decreased range of motion of right knee  Follow Up In Physical Therapy      3. Aftercare following right knee joint replacement surgery  Follow Up In Physical Therapy        Problem List Items Addressed This Visit             ICD-10-CM    Right knee pain M25.561    Decreased range of motion of right knee M25.661    Aftercare following right knee joint replacement surgery Z47.1, Z96.651       General   **s/p R TKA 12/02/2024     Precautions:   STEADI Fall Risk Score (The score of 4 or more indicates an increased risk of falling): 4  Per STEADI fall risk screen and reported symptoms, recommend use of an assistive device at all times to assist with management of symptoms and safety with all functional mobility.     Subjective    Noticed standing with more weight on the L side    Pain  5-6/10 R knee pain    Objective     Treatments:  Therapeutic Exercise: reviewed edema management, " modification or discontinuation of any exercise if adverse reaction arises.  Scifit stepper x 6 mins ROM/warm-up/strength  Step stretch 2 x 10 reps  Shuttle DL L5 2 x 15, SL L2 x 10 reps   Indep amb focusing on heel to toe gait pattern 4 x 15 ft  SLS H3-5 with intermittent handrail support x 5 reps ea  Standing calf stretch on incline board 3 x 30 sec  Supine Heel Slide Passive with Strap H5 x 20 reps  Seated knee flexion, extension x 20 reps  Verbal cues and demonstration for comparable weight bearing in standing     OP Education: verbal, demonstration     Access Code: [email or web address]  URL: [email or web address]  Date: 12/23/2024  Prepared by: Alyssa     Exercises  - Supine Heel Slide Passive with Strap  - 1 x daily - 7 x weekly - 2 sets - 10 reps  - Propped Knee Extension  - 1 x daily - 7 x weekly - 2 sets - 10 reps  - Long Sitting Calf Stretch with Strap  - 1 x daily - 7 x weekly - 2 sets - 10 reps  - Supine Quadricep Sets  - 1 x daily - 7 x weekly - 2 sets - 10 reps  - Supine Quadriceps Stretch with Strap on Table  - 1 x daily - 7 x weekly - 3 reps - 30 seconds hold  - Seated Long Arc Quad  - 1 x daily - 7 x weekly - 2 sets - 10 reps  - Seated Hamstring Stretch  - 1 x daily - 7 x weekly - 3 reps - 30 seconds hold  - Seated Gastroc Stretch with Strap  - 1 x daily - 7 x weekly - 3 reps - 30 seconds hold       Goals:  Active       PT Problem       Patient will reciprocally negotiate stairs with handrail support Independently       Start:  12/23/24    Expected End:  03/23/25            Patient will walk, transfer without pain on average, pain no greater than 4/10 at worst to increase ease with home and work duties       Start:  12/23/24    Expected End:  03/23/25            Patient will improve R knee AROM 0->110 deg to increase ease with daily functional mobility       Start:  12/23/24    Expected End:  03/23/25            Patient will improve R knee strength to >=4+/5 to increase ease with  transfers and stair negotiation       Start:  12/23/24    Expected End:  03/23/25            Patient will demonstrate independence and compliance with HEP and self management       Start:  12/23/24    Expected End:  03/23/25            Patient will improve LEFS score by 9 points to meet minimal detectable change of improvement to demonstrate increased functional mobility        Start:  12/23/24    Expected End:  03/23/25

## 2025-01-06 ENCOUNTER — TELEPHONE (OUTPATIENT)
Dept: ORTHOPEDIC SURGERY | Facility: CLINIC | Age: 60
End: 2025-01-06
Payer: COMMERCIAL

## 2025-01-06 DIAGNOSIS — Z96.651 AFTERCARE FOLLOWING RIGHT KNEE JOINT REPLACEMENT SURGERY: Primary | ICD-10-CM

## 2025-01-06 DIAGNOSIS — Z47.1 AFTERCARE FOLLOWING RIGHT KNEE JOINT REPLACEMENT SURGERY: Primary | ICD-10-CM

## 2025-01-06 RX ORDER — HYDROCODONE BITARTRATE AND ACETAMINOPHEN 5; 325 MG/1; MG/1
1 TABLET ORAL EVERY 6 HOURS PRN
Qty: 28 TABLET | Refills: 0 | Status: SHIPPED | OUTPATIENT
Start: 2025-01-06 | End: 2025-01-13

## 2025-01-07 ENCOUNTER — TREATMENT (OUTPATIENT)
Dept: PHYSICAL THERAPY | Facility: CLINIC | Age: 60
End: 2025-01-07
Payer: COMMERCIAL

## 2025-01-07 DIAGNOSIS — Z96.651 AFTERCARE FOLLOWING RIGHT KNEE JOINT REPLACEMENT SURGERY: ICD-10-CM

## 2025-01-07 DIAGNOSIS — M25.561 RIGHT KNEE PAIN, UNSPECIFIED CHRONICITY: ICD-10-CM

## 2025-01-07 DIAGNOSIS — M25.661 DECREASED RANGE OF MOTION OF RIGHT KNEE: ICD-10-CM

## 2025-01-07 DIAGNOSIS — Z47.1 AFTERCARE FOLLOWING RIGHT KNEE JOINT REPLACEMENT SURGERY: ICD-10-CM

## 2025-01-07 PROCEDURE — 97112 NEUROMUSCULAR REEDUCATION: CPT | Mod: GP

## 2025-01-07 PROCEDURE — 97110 THERAPEUTIC EXERCISES: CPT | Mod: GP

## 2025-01-07 NOTE — PROGRESS NOTES
"Physical Therapy    Physical Therapy Treatment    Patient Name: Festus Menjivar \"Nadia\"  MRN: [id number]  Today's Date: 1/7/2025  Time Calculation  Start Time: 1243  Stop Time: 1343  Time Calculation (min): 60 min     PT Therapeutic Procedures Time Entry  Neuromuscular Re-Education Time Entry: 12  Therapeutic Exercise Time Entry: 48        Insurance: [employer] [place] [employer] [place]  No authorization required by insurance after initial evaluation  Primary diagnosis: R knee pain  Visit # 4      Assessment:  Patient continues to progress with PT POC. Improved standing with comparable weight bearing majority of treatment. Improvement in gait pattern between sessions and within session with patient focus on stride length, heel strike and toe off. Progressed with dynamic gait/balance, as well as strengthening with resistance band. R knee AROM 0-108 deg. Pain 3/10 at end of treatment.    Plan:  Continue with PT POC to improve R knee ROM, strength, functional mobility and gait post-operatively.       Current Problem  1. Right knee pain, unspecified chronicity  Follow Up In Physical Therapy      2. Decreased range of motion of right knee  Follow Up In Physical Therapy      3. Aftercare following right knee joint replacement surgery  Follow Up In Physical Therapy        Problem List Items Addressed This Visit             ICD-10-CM    Right knee pain M25.561    Decreased range of motion of right knee M25.661    Aftercare following right knee joint replacement surgery Z47.1, Z96.651       General   **s/p R TKA 12/02/2024     Precautions:   STEADI Fall Risk Score (The score of 4 or more indicates an increased risk of falling): 4  Per STEADI fall risk screen and reported symptoms, recommend use of an assistive device at all times to assist with management of symptoms and safety with all functional mobility.     Subjective    A couple times at night, pain increased, as well as with getting up. Using pillow between the knees.    Pain  4/10 " "R knee pain    Objective     Treatments:  Therapeutic Exercise:   Scifit stepper S15 x 6 mins ROM/warm-up/strength  Standing hamstring stretch 2 x 30 sec  Step stretch H5 x 20 reps  Shuttle DL L5 x 30, SL L2 x 15 reps   Step up 6\" fwd, lateral x 10 reps ea  Standing calf stretch on incline board 3 x 30 sec  Knee flexion resisted with green TB x 20 reps ea  TKE with green TB x 20 reps   Supine Heel Slide Passive with Strap x 20 reps  Glute bridge x 10 reps  Seated knee flexion, extension x 20 reps    Neuromuscular re-education:  SLS H5-10 with intermittent handrail support x 5 reps ea  Dynamic gait alt hip flexion, knee flexion, lateral stepping 2 x 15 ft ea  Indep amb focusing on heel to toe, comparable stride length 4 x 15 ft    OP Education: verbal, demonstration     Access Code: [email or web address]  URL: [email or web address]  Date: 12/23/2024  Prepared by: Alyssa     Exercises  - Supine Heel Slide Passive with Strap  - 1 x daily - 7 x weekly - 2 sets - 10 reps  - Propped Knee Extension  - 1 x daily - 7 x weekly - 2 sets - 10 reps  - Long Sitting Calf Stretch with Strap  - 1 x daily - 7 x weekly - 2 sets - 10 reps  - Supine Quadricep Sets  - 1 x daily - 7 x weekly - 2 sets - 10 reps  - Supine Quadriceps Stretch with Strap on Table  - 1 x daily - 7 x weekly - 3 reps - 30 seconds hold  - Seated Long Arc Quad  - 1 x daily - 7 x weekly - 2 sets - 10 reps  - Seated Hamstring Stretch  - 1 x daily - 7 x weekly - 3 reps - 30 seconds hold  - Seated Gastroc Stretch with Strap  - 1 x daily - 7 x weekly - 3 reps - 30 seconds hold       Goals:  Active       PT Problem       Patient will reciprocally negotiate stairs with handrail support Independently       Start:  12/23/24    Expected End:  03/23/25            Patient will walk, transfer without pain on average, pain no greater than 4/10 at worst to increase ease with home and work duties       Start:  12/23/24    Expected End:  03/23/25            Patient will " improve R knee AROM 0->110 deg to increase ease with daily functional mobility       Start:  12/23/24    Expected End:  03/23/25            Patient will improve R knee strength to >=4+/5 to increase ease with transfers and stair negotiation       Start:  12/23/24    Expected End:  03/23/25            Patient will demonstrate independence and compliance with HEP and self management       Start:  12/23/24    Expected End:  03/23/25            Patient will improve LEFS score by 9 points to meet minimal detectable change of improvement to demonstrate increased functional mobility        Start:  12/23/24    Expected End:  03/23/25

## 2025-01-08 ENCOUNTER — TELEPHONE (OUTPATIENT)
Dept: ORTHOPEDIC SURGERY | Facility: CLINIC | Age: 60
End: 2025-01-08
Payer: COMMERCIAL

## 2025-01-08 NOTE — TELEPHONE ENCOUNTER
----- Message from Priti ALFARO sent at 1/7/2025  4:40 PM EST -----  Regarding: RETURN TO WORK REMOTELY  PATIENT WANTS A NOTE STATING SHE CAN GO BACK TO WORK REMOTELY UNTIL FURTHER NOTICE. SHE HAS A FOLLOW UP ON 01/14 AND WILL DISCUSS GOING BACK TO WORK IN PERSON AT THAT TIME.

## 2025-01-08 NOTE — LETTER
[facility]  [address] [place] [address] [place] [id number]  PHONE: [phone]  FAX: [phone]      January 8, 2025    Patient: Festus Menjivar   YOB: 1965   Date of Visit: 1/8/2025       To Whom It May Concern,    My patient, Festus Menjivar,has been advised she may begin working from home until her follow up appointment on 1/14/2025, at that time we will discuss an approximate date to return working in person.    If you have any questions or concerns, please contact the office by phone or fax.  Phone: [phone] or Fax: [phone]        Sincerely,      Darrion Murphy MD

## 2025-01-09 ENCOUNTER — TREATMENT (OUTPATIENT)
Dept: PHYSICAL THERAPY | Facility: CLINIC | Age: 60
End: 2025-01-09
Payer: COMMERCIAL

## 2025-01-09 DIAGNOSIS — Z96.651 AFTERCARE FOLLOWING RIGHT KNEE JOINT REPLACEMENT SURGERY: ICD-10-CM

## 2025-01-09 DIAGNOSIS — M25.561 RIGHT KNEE PAIN, UNSPECIFIED CHRONICITY: Primary | ICD-10-CM

## 2025-01-09 DIAGNOSIS — Z47.1 AFTERCARE FOLLOWING RIGHT KNEE JOINT REPLACEMENT SURGERY: ICD-10-CM

## 2025-01-09 DIAGNOSIS — M25.661 DECREASED RANGE OF MOTION OF RIGHT KNEE: ICD-10-CM

## 2025-01-09 PROCEDURE — 97110 THERAPEUTIC EXERCISES: CPT | Mod: GP

## 2025-01-09 PROCEDURE — 97112 NEUROMUSCULAR REEDUCATION: CPT | Mod: GP

## 2025-01-09 NOTE — PROGRESS NOTES
"Physical Therapy    Physical Therapy Treatment    Patient Name: Festus Menjivar \"Nadia\"  MRN: [id number]  Today's Date: 1/9/2025  Time Calculation  Start Time: 1248  Stop Time: 1333  Time Calculation (min): 45 min     PT Therapeutic Procedures Time Entry  Neuromuscular Re-Education Time Entry: 10  Therapeutic Exercise Time Entry: 35        Insurance: [employer] [place] [employer] [place]  No authorization required by insurance after initial evaluation  Primary diagnosis: R knee pain  Visit # 5      Assessment:  Increased resistance with strengthening and balance challenges to improve mobility post-operatively. Patient continues to progress within PT POC, no adverse reaction with progression.     Plan:  Orthopedics appointment 01/14/2025  Continue with PT POC to improve R knee ROM, strength, functional mobility and gait post-operatively.       Current Problem  1. Right knee pain, unspecified chronicity  Follow Up In Physical Therapy      2. Decreased range of motion of right knee  Follow Up In Physical Therapy      3. Aftercare following right knee joint replacement surgery  Follow Up In Physical Therapy          Problem List Items Addressed This Visit             ICD-10-CM    Right knee pain - Primary M25.561    Decreased range of motion of right knee M25.661    Aftercare following right knee joint replacement surgery Z47.1, Z96.651         General   **s/p R TKA 12/02/2024     Precautions:   STEADI Fall Risk Score (The score of 4 or more indicates an increased risk of falling): 4  Per STEADI fall risk screen and reported symptoms, recommend use of an assistive device at all times to assist with management of symptoms and safety with all functional mobility.     Subjective    HEP daily. Trying to walk more.    Pain  3/10 R knee pain    Objective   01/07/2025: R knee AROM 0-108 deg    Treatments:  Therapeutic Exercise:   Scifit stepper S15 x 6 mins ROM/warm-up/strength  Step stretch H5 x 20 reps ea  Standing calf stretch on incline " "board 3 x 30 sec  Shuttle DL L6 x 30, SL L4 2 x 15 reps   Step up 6\" fwd, lateral x 15 reps ea  Seated knee flexion machine 11# 2 x 10 reps  Seated knee extension machine 11# 2 x 10 reps  Not today:  Supine Heel Slide Passive with Strap x 20 reps  Glute bridge x 10 reps  Seated knee flexion, extension x 20 reps  Standing hamstring stretch 2 x 30 sec    Neuromuscular re-education:  SLS H5-10 with intermittent handrail support x 6 reps ea  Dynamic gait alt hip flexion, knee flexion, lateral stepping with red TB 2 x 15 ft ea  Fwd retro ambulation x 15 ft    OP Education: verbal, demonstration     Access Code: [email or web address]  URL: [email or web address]  Date: 12/23/2024  Prepared by: Alyssa     Exercises  - Supine Heel Slide Passive with Strap  - 1 x daily - 7 x weekly - 2 sets - 10 reps  - Propped Knee Extension  - 1 x daily - 7 x weekly - 2 sets - 10 reps  - Long Sitting Calf Stretch with Strap  - 1 x daily - 7 x weekly - 2 sets - 10 reps  - Supine Quadricep Sets  - 1 x daily - 7 x weekly - 2 sets - 10 reps  - Supine Quadriceps Stretch with Strap on Table  - 1 x daily - 7 x weekly - 3 reps - 30 seconds hold  - Seated Long Arc Quad  - 1 x daily - 7 x weekly - 2 sets - 10 reps  - Seated Hamstring Stretch  - 1 x daily - 7 x weekly - 3 reps - 30 seconds hold  - Seated Gastroc Stretch with Strap  - 1 x daily - 7 x weekly - 3 reps - 30 seconds hold       Goals:  Active       PT Problem       Patient will reciprocally negotiate stairs with handrail support Independently       Start:  12/23/24    Expected End:  03/23/25            Patient will walk, transfer without pain on average, pain no greater than 4/10 at worst to increase ease with home and work duties       Start:  12/23/24    Expected End:  03/23/25            Patient will improve R knee AROM 0->110 deg to increase ease with daily functional mobility       Start:  12/23/24    Expected End:  03/23/25            Patient will improve R knee strength " to >=4+/5 to increase ease with transfers and stair negotiation       Start:  12/23/24    Expected End:  03/23/25            Patient will demonstrate independence and compliance with HEP and self management       Start:  12/23/24    Expected End:  03/23/25            Patient will improve LEFS score by 9 points to meet minimal detectable change of improvement to demonstrate increased functional mobility        Start:  12/23/24    Expected End:  03/23/25

## 2025-01-12 ENCOUNTER — PATIENT MESSAGE (OUTPATIENT)
Dept: ORTHOPEDIC SURGERY | Facility: CLINIC | Age: 60
End: 2025-01-12
Payer: COMMERCIAL

## 2025-01-12 DIAGNOSIS — Z47.1 AFTERCARE FOLLOWING RIGHT KNEE JOINT REPLACEMENT SURGERY: ICD-10-CM

## 2025-01-12 DIAGNOSIS — Z96.651 AFTERCARE FOLLOWING RIGHT KNEE JOINT REPLACEMENT SURGERY: ICD-10-CM

## 2025-01-13 ENCOUNTER — TELEPHONE (OUTPATIENT)
Dept: ORTHOPEDIC SURGERY | Facility: CLINIC | Age: 60
End: 2025-01-13
Payer: COMMERCIAL

## 2025-01-13 RX ORDER — HYDROCODONE BITARTRATE AND ACETAMINOPHEN 5; 325 MG/1; MG/1
1 TABLET ORAL EVERY 6 HOURS PRN
Qty: 28 TABLET | Refills: 0 | Status: SHIPPED | OUTPATIENT
Start: 2025-01-13 | End: 2025-01-20

## 2025-01-13 NOTE — TELEPHONE ENCOUNTER
Patient LVM stating she needs her pain refills at drug [facility] she can be reach at [phone]

## 2025-01-13 NOTE — TELEPHONE ENCOUNTER
Refill was sent today  E-Prescribing Status: Receipt confirmed by pharmacy (1/13/2025 11:11 AM EST)

## 2025-01-14 ENCOUNTER — OFFICE VISIT (OUTPATIENT)
Dept: ORTHOPEDIC SURGERY | Facility: CLINIC | Age: 60
End: 2025-01-14
Payer: COMMERCIAL

## 2025-01-14 ENCOUNTER — APPOINTMENT (OUTPATIENT)
Dept: PHYSICAL THERAPY | Facility: CLINIC | Age: 60
End: 2025-01-14
Payer: COMMERCIAL

## 2025-01-14 DIAGNOSIS — Z96.651 AFTERCARE FOLLOWING RIGHT KNEE JOINT REPLACEMENT SURGERY: Primary | ICD-10-CM

## 2025-01-14 DIAGNOSIS — Z47.1 AFTERCARE FOLLOWING RIGHT KNEE JOINT REPLACEMENT SURGERY: Primary | ICD-10-CM

## 2025-01-14 PROCEDURE — 99211 OFF/OP EST MAY X REQ PHY/QHP: CPT | Performed by: ORTHOPAEDIC SURGERY

## 2025-01-14 NOTE — PROGRESS NOTES
History of present illness    59-year-old female here for follow-up status post right total knee replacement from December 2 she is 5 weeks postop she states she is making good progress noticing improved range of motion continue to work with her physical therapy no numbness or tingling no fevers or chills she is ambulating without assist device      Past medical , Surgical, Family and social history reviewed.      Physical exam  General: No acute distress and breathing comfortably.  Patient is pleasant and cooperative with the examination.    Extremity  Incision well-approximated without sign infection range of motion 5-90 no instability brisk cap refill compartments soft calf is nontender    Diagnostics      XR knee right 3 views    Result Date: 12/17/2024  Interpreted By:  Jagruti Murphy, STUDY: XR KNEE RIGHT 3 VIEWS; 12/17/2024 3:14 pm   INDICATION: Signs/Symptoms:pain.   ACCESSION NUMBER(S): [id number]   ORDERING CLINICIAN: JAGRUTI MURPHY   FINDINGS: Three views of the knee show status post joint replacement in good alignment.  There are no signs of fracture or dislocation.  No other bony abnormalities       Signed by: Jagruti Murphy 12/17/2024 4:57 PM Dictation workstation:   [id number]       Procedure  [ none]    Assessment  Healing total knee replacement right    Treatment plan  1.  Continue work on range of motion strengthening continue with her physical therapy continue to weight-bear as tolerated follow-up with me in the office in 6 weeks with x-rays sooner if she has increased pain or discomfort.  She is given a note to return to work beginning on 1/27/2025  2. [   ]  3. [   ]  4.  All of the patient's questions were answered.    No orders of the defined types were placed in this encounter.      This note was prepared using voice recognition software.  The details of this note are correct and have been reviewed, and corrected to the best of my ability.  Some grammatical  areas may persist related to the Dragon software    Darrion Murphy MD  Senior Attending Physician  [facility]  [facility] [place]    [phone]

## 2025-01-14 NOTE — LETTER
[facility]  [address] [place] [address] [place] [id number]  PHONE: [phone]  FAX: [phone]      January 14, 2025    Patient: Festus Menjivar   YOB: 1965   Date of Visit: 1/14/2025       To Whom It May Concern,    Festus Menjivar was seen in my clinic on 1/14/2025, she has been advised to continue to work from home until 1/26/2025, on 1/27/2025 she may return to work in the office as tolerated.    If you have any questions or concerns, please contact the office by phone or fax.  Phone: [phone] or Fax: [phone]        Sincerely,      Darrion Murphy MD    
Detail Level: None
Urine Pregnancy Test Result: negative
Performed By: Xiomy
Lot # (Optional): [id number]
Expiration Date (Optional): 7/31/2020

## 2025-01-16 ENCOUNTER — APPOINTMENT (OUTPATIENT)
Dept: PHYSICAL THERAPY | Facility: CLINIC | Age: 60
End: 2025-01-16
Payer: COMMERCIAL

## 2025-01-17 ENCOUNTER — TELEPHONE (OUTPATIENT)
Dept: ORTHOPEDIC SURGERY | Facility: CLINIC | Age: 60
End: 2025-01-17

## 2025-01-17 ENCOUNTER — TREATMENT (OUTPATIENT)
Dept: PHYSICAL THERAPY | Facility: CLINIC | Age: 60
End: 2025-01-17
Payer: COMMERCIAL

## 2025-01-17 DIAGNOSIS — Z47.1 AFTERCARE FOLLOWING RIGHT KNEE JOINT REPLACEMENT SURGERY: ICD-10-CM

## 2025-01-17 DIAGNOSIS — Z96.651 AFTERCARE FOLLOWING RIGHT KNEE JOINT REPLACEMENT SURGERY: ICD-10-CM

## 2025-01-17 DIAGNOSIS — M25.661 DECREASED RANGE OF MOTION OF RIGHT KNEE: ICD-10-CM

## 2025-01-17 DIAGNOSIS — M25.561 RIGHT KNEE PAIN, UNSPECIFIED CHRONICITY: Primary | ICD-10-CM

## 2025-01-17 PROCEDURE — 97110 THERAPEUTIC EXERCISES: CPT | Mod: GP

## 2025-01-17 PROCEDURE — 97112 NEUROMUSCULAR REEDUCATION: CPT | Mod: GP

## 2025-01-17 RX ORDER — NABUMETONE 500 MG/1
500 TABLET, FILM COATED ORAL 2 TIMES DAILY
Qty: 60 TABLET | Refills: 0 | Status: SHIPPED | OUTPATIENT
Start: 2025-01-17 | End: 2025-02-16

## 2025-01-17 NOTE — PROGRESS NOTES
"Physical Therapy    Physical Therapy Treatment    Patient Name: Festus Menjivar \"Nadia\"  MRN: [id number]  Today's Date: 1/17/2025  Time Calculation  Start Time: 1400  Stop Time: 1445  Time Calculation (min): 45 min     PT Therapeutic Procedures Time Entry  Neuromuscular Re-Education Time Entry: 10  Therapeutic Exercise Time Entry: 35        Insurance: Medical [place] [employer] [place]  No authorization required by insurance after initial evaluation  Primary diagnosis: R knee pain  Visit # 6      Assessment:  Verbal cues and demonstration to reduce compensatory hip circumduction vs knee flexion with steps. Good carry over within session. Plan to ensure carry over at next visit and patient aware to complete between sessions with daily functional mobility. Progressed strengthening to patients tolerance. 3/10 R knee pain.    Plan:  Continue with PT POC to improve R knee ROM, strength, functional mobility and gait post-operatively.       Current Problem  1. Right knee pain, unspecified chronicity  Follow Up In Physical Therapy      2. Decreased range of motion of right knee  Follow Up In Physical Therapy      3. Aftercare following right knee joint replacement surgery  Follow Up In Physical Therapy            Problem List Items Addressed This Visit             ICD-10-CM    Right knee pain - Primary M25.561    Decreased range of motion of right knee M25.661    Aftercare following right knee joint replacement surgery Z47.1, Z96.651       General   **s/p R TKA 12/02/2024     Precautions:   STEADI Fall Risk Score (The score of 4 or more indicates an increased risk of falling): 4  Per STEADI fall risk screen and reported symptoms, recommend use of an assistive device at all times to assist with management of symptoms and safety with all functional mobility.     Subjective    HEP daily. Feels the R knee is moving better after getting into new car. 5-6/10 at worst.    Pain  1.5-2/10 R knee pain    Objective   01/07/2025: R knee AROM 0-108 " "deg    Treatments:  Therapeutic Exercise:   Scifit stepper S15 x 6 mins ROM/warm-up/strength  Step stretch H5 x 20 reps ea  Standing calf stretch on incline board 3 x 30 sec  Shuttle DL L6 x 30, SL L4 2 x 15 reps   Stair negotiation 2 6\", 3 4\" steps with handrail support focusing on knee flexion vs hip circumduction  Step down 4\" x 10 reps ea  Seated knee flexion machine 11# 2 x 10 reps  Seated knee extension machine 22# x 10 reps    Not today:  Supine Heel Slide Passive with Strap x 20 reps  Glute bridge x 10 reps  Seated knee flexion, extension x 20 reps  Standing hamstring stretch 2 x 30 sec    Neuromuscular re-education:  SLS H5-10 without handrail support x 6 reps ea  Dynamic gait alt hip flexion, knee flexion, lateral stepping with red TB 3 x 15 ft ea  Fwd retro ambulation x 15 ft    OP Education: verbal, demonstration     Goals:  Active       PT Problem       Patient will reciprocally negotiate stairs with handrail support Independently       Start:  12/23/24    Expected End:  03/23/25            Patient will walk, transfer without pain on average, pain no greater than 4/10 at worst to increase ease with home and work duties       Start:  12/23/24    Expected End:  03/23/25            Patient will improve R knee AROM 0->110 deg to increase ease with daily functional mobility       Start:  12/23/24    Expected End:  03/23/25            Patient will improve R knee strength to >=4+/5 to increase ease with transfers and stair negotiation       Start:  12/23/24    Expected End:  03/23/25            Patient will demonstrate independence and compliance with HEP and self management       Start:  12/23/24    Expected End:  03/23/25            Patient will improve LEFS score by 9 points to meet minimal detectable change of improvement to demonstrate increased functional mobility        Start:  12/23/24    Expected End:  03/23/25                "

## 2025-01-19 ENCOUNTER — PATIENT MESSAGE (OUTPATIENT)
Dept: ORTHOPEDIC SURGERY | Facility: CLINIC | Age: 60
End: 2025-01-19
Payer: COMMERCIAL

## 2025-01-19 DIAGNOSIS — Z47.1 AFTERCARE FOLLOWING RIGHT KNEE JOINT REPLACEMENT SURGERY: ICD-10-CM

## 2025-01-19 DIAGNOSIS — Z96.651 AFTERCARE FOLLOWING RIGHT KNEE JOINT REPLACEMENT SURGERY: ICD-10-CM

## 2025-01-20 ENCOUNTER — TELEPHONE (OUTPATIENT)
Dept: ORTHOPEDIC SURGERY | Facility: CLINIC | Age: 60
End: 2025-01-20
Payer: COMMERCIAL

## 2025-01-20 RX ORDER — HYDROCODONE BITARTRATE AND ACETAMINOPHEN 5; 325 MG/1; MG/1
1 TABLET ORAL EVERY 8 HOURS PRN
Qty: 21 TABLET | Refills: 0 | Status: SHIPPED | OUTPATIENT
Start: 2025-01-20 | End: 2025-01-27

## 2025-01-20 NOTE — TELEPHONE ENCOUNTER
Patient LVM requesting a refill on her pain medication. She is s/p Rt. TKA 12/02/24. Pharmacy on file is correct.

## 2025-01-21 ENCOUNTER — TREATMENT (OUTPATIENT)
Dept: PHYSICAL THERAPY | Facility: CLINIC | Age: 60
End: 2025-01-21
Payer: COMMERCIAL

## 2025-01-21 DIAGNOSIS — F41.9 ANXIETY: ICD-10-CM

## 2025-01-21 DIAGNOSIS — M25.561 RIGHT KNEE PAIN, UNSPECIFIED CHRONICITY: Primary | ICD-10-CM

## 2025-01-21 DIAGNOSIS — Z96.651 AFTERCARE FOLLOWING RIGHT KNEE JOINT REPLACEMENT SURGERY: ICD-10-CM

## 2025-01-21 DIAGNOSIS — Z47.1 AFTERCARE FOLLOWING RIGHT KNEE JOINT REPLACEMENT SURGERY: ICD-10-CM

## 2025-01-21 DIAGNOSIS — M25.661 DECREASED RANGE OF MOTION OF RIGHT KNEE: ICD-10-CM

## 2025-01-21 PROCEDURE — 97112 NEUROMUSCULAR REEDUCATION: CPT | Mod: GP

## 2025-01-21 PROCEDURE — 97110 THERAPEUTIC EXERCISES: CPT | Mod: GP

## 2025-01-21 RX ORDER — DIAZEPAM 5 MG/1
5 TABLET ORAL 2 TIMES DAILY PRN
Qty: 60 TABLET | Refills: 0 | Status: SHIPPED | OUTPATIENT
Start: 2025-01-21 | End: 2025-02-20

## 2025-01-21 NOTE — PROGRESS NOTES
"Physical Therapy    Physical Therapy Treatment    Patient Name: Festus Menjivar \"Nadia\"  MRN: [id number]  Today's Date: 1/21/2025  Time Calculation  Start Time: 1330  Stop Time: 1410  Time Calculation (min): 40 min     PT Therapeutic Procedures Time Entry  Neuromuscular Re-Education Time Entry: 10  Therapeutic Exercise Time Entry: 30        Insurance: [employer] [place] [employer] [place]  No authorization required by insurance after initial evaluation  Primary diagnosis: R knee pain  Visit # 7      Assessment:  Improvement in knee flexion during stair negotiation between sessions. Increased hip circumduction with transitional movements (I.e. on/off shuttle); patient able to complete without compensation once aware. Progressed with therapeutic exercises and neuromuscular reeducation without adverse reaction. 1.5/10 R knee pain at end of treatment.    Plan:  Continue with PT POC to improve R knee ROM, strength, functional mobility and gait post-operatively.       Current Problem  1. Right knee pain, unspecified chronicity  Follow Up In Physical Therapy      2. Decreased range of motion of right knee  Follow Up In Physical Therapy      3. Aftercare following right knee joint replacement surgery  Follow Up In Physical Therapy            Problem List Items Addressed This Visit             ICD-10-CM    Right knee pain - Primary M25.561    Decreased range of motion of right knee M25.661    Aftercare following right knee joint replacement surgery Z47.1, Z96.651       General   **s/p R TKA 12/02/2024     Precautions:   STEADI Fall Risk Score (The score of 4 or more indicates an increased risk of falling): 4  Per STEADI fall risk screen and reported symptoms, recommend use of an assistive device at all times to assist with management of symptoms and safety with all functional mobility.     Subjective    Stairs are getting better. Less stiff the other day, feels stiff today with the cold weather.  HEP daily.    Pain  2/10 R knee " "pain    Objective   01/07/2025: R knee AROM 0-108 deg    Treatments:  Therapeutic Exercise:   Scifit stepper S15 x 7 mins ROM/warm-up/strength  Stair negotiation 2 6\", 3 4\" steps with handrail support focusing on knee flexion vs hip circumduction  Step stretch H5 x 20 reps ea  Standing calf stretch on incline board 3 x 30 sec  Shuttle DL L6 x 30, SL L5 2 x 15 reps   Step down 4\" x 10 reps ea  Seated knee extension machine 22# x 15 reps  Seated knee flexion machine 11# x 25 reps   Standing 3 way hip with red TB x 10 reps ea    Not today:  Supine Heel Slide Passive with Strap x 20 reps  Glute bridge x 10 reps  Seated knee flexion, extension x 20 reps  Standing hamstring stretch 2 x 30 sec    Neuromuscular re-education:  SLS H5-10 without handrail support x 8 reps ea  Dynamic gait alt SLS hip flexion, knee flexion 4 x 15ft  Lateral stepping with red TB 2 x 15 ft ea  Fwd retro ambulation with red TB 2 x 15 ft    OP Education: verbal, demonstration     Goals:  Active       PT Problem       Patient will reciprocally negotiate stairs with handrail support Independently       Start:  12/23/24    Expected End:  03/23/25            Patient will walk, transfer without pain on average, pain no greater than 4/10 at worst to increase ease with home and work duties       Start:  12/23/24    Expected End:  03/23/25            Patient will improve R knee AROM 0->110 deg to increase ease with daily functional mobility       Start:  12/23/24    Expected End:  03/23/25            Patient will improve R knee strength to >=4+/5 to increase ease with transfers and stair negotiation       Start:  12/23/24    Expected End:  03/23/25            Patient will demonstrate independence and compliance with HEP and self management       Start:  12/23/24    Expected End:  03/23/25            Patient will improve LEFS score by 9 points to meet minimal detectable change of improvement to demonstrate increased functional mobility        Start:  " 12/23/24    Expected End:  03/23/25

## 2025-01-23 ENCOUNTER — TREATMENT (OUTPATIENT)
Dept: PHYSICAL THERAPY | Facility: CLINIC | Age: 60
End: 2025-01-23
Payer: COMMERCIAL

## 2025-01-23 DIAGNOSIS — Z96.651 AFTERCARE FOLLOWING RIGHT KNEE JOINT REPLACEMENT SURGERY: ICD-10-CM

## 2025-01-23 DIAGNOSIS — M25.661 DECREASED RANGE OF MOTION OF RIGHT KNEE: ICD-10-CM

## 2025-01-23 DIAGNOSIS — M25.561 RIGHT KNEE PAIN, UNSPECIFIED CHRONICITY: ICD-10-CM

## 2025-01-23 DIAGNOSIS — Z47.1 AFTERCARE FOLLOWING RIGHT KNEE JOINT REPLACEMENT SURGERY: ICD-10-CM

## 2025-01-23 PROCEDURE — 97110 THERAPEUTIC EXERCISES: CPT | Mod: GP

## 2025-01-23 PROCEDURE — 97140 MANUAL THERAPY 1/> REGIONS: CPT | Mod: GP

## 2025-01-23 PROCEDURE — 97112 NEUROMUSCULAR REEDUCATION: CPT | Mod: GP

## 2025-01-23 NOTE — PROGRESS NOTES
"Physical Therapy    Physical Therapy Treatment    Patient Name: Festus Menjivar \"Nadia\"  MRN: [id number]  Today's Date: 1/23/2025  Time Calculation  Start Time: 1240  Stop Time: 1338  Time Calculation (min): 58 min     PT Therapeutic Procedures Time Entry  Manual Therapy Time Entry: 15  Neuromuscular Re-Education Time Entry: 10  Therapeutic Exercise Time Entry: 33        Insurance: Medical [place] [employer] [place]  No authorization required by insurance after initial evaluation  Primary diagnosis: R knee pain  Visit # 8      Assessment:  R knee AROM 0-110 deg. She continues to improve in functional mobility, progressing functional step down to 6\" to improve stair negotiation with focus on avoiding compensatory movements. Pain unrated at end of treatment, ambulating with comparable step length, heel strike and toe off upon leaving clinic.    Plan:  Patient is progressing towards all PT goals. Continue with PT POC to improve R knee ROM, strength, functional mobility and gait post-operatively.       Current Problem  1. Right knee pain, unspecified chronicity  Follow Up In Physical Therapy      2. Decreased range of motion of right knee  Follow Up In Physical Therapy      3. Aftercare following right knee joint replacement surgery  Follow Up In Physical Therapy          Problem List Items Addressed This Visit             ICD-10-CM    Right knee pain M25.561    Decreased range of motion of right knee M25.661    Aftercare following right knee joint replacement surgery Z47.1, Z96.651       General   **s/p R TKA 12/02/2024     Precautions:   STEADI Fall Risk Score (The score of 4 or more indicates an increased risk of falling): 4  Per STEADI fall risk screen and reported symptoms, recommend use of an assistive device at all times to assist with management of symptoms and safety with all functional mobility.     Subjective    Stairs are getting better. Less stiff the other day, feels stiff today with the cold weather.  HEP " "daily.    Pain  2/10 R knee pain    Objective   01/23/2025: R knee AROM 0-108 deg    Treatments:  Therapeutic Exercise:   Scifit stepper S15 x 6 mins ROM/warm-up/strength  Step stretch H5 x 20 reps ea  Stair negotiation 2 6\", 3 4\" steps with handrail support focusing on knee flexion x 8  Standing calf stretch on incline board 3 x 30 sec  Shuttle DL L6 x 30, SL L5 2 x 15 reps   Step down 6\" x 10 reps ea  Seated knee extension machine 22# x 15 reps  Seated knee flexion machine 11# 2 x 15 reps   Standing 3 way hip with red TB x 10 reps ea - not today  Lateral stepping with red TB 2 x 15 ft ea  Upright bike x 5 mins ROM    Neuromuscular re-education:  SLS H5 on airex without handrail support x 5 reps ea  Dynamic gait alt SLS hip flexion, knee flexion 4 x 15ft  Fwd retro ambulation with red TB 2 x 15 ft    Manual Therapy: PROM R knee, stretching to improve R knee flexion    OP Education: verbal, demonstration     Goals:  Active       PT Problem       Patient will reciprocally negotiate stairs with handrail support Independently       Start:  12/23/24    Expected End:  03/23/25            Patient will walk, transfer without pain on average, pain no greater than 4/10 at worst to increase ease with home and work duties       Start:  12/23/24    Expected End:  03/23/25            Patient will improve R knee AROM 0->110 deg to increase ease with daily functional mobility       Start:  12/23/24    Expected End:  03/23/25            Patient will improve R knee strength to >=4+/5 to increase ease with transfers and stair negotiation       Start:  12/23/24    Expected End:  03/23/25            Patient will demonstrate independence and compliance with HEP and self management       Start:  12/23/24    Expected End:  03/23/25            Patient will improve LEFS score by 9 points to meet minimal detectable change of improvement to demonstrate increased functional mobility        Start:  12/23/24    Expected End:  03/23/25          "

## 2025-01-27 ENCOUNTER — PATIENT MESSAGE (OUTPATIENT)
Dept: ORTHOPEDIC SURGERY | Facility: CLINIC | Age: 60
End: 2025-01-27
Payer: COMMERCIAL

## 2025-01-27 ENCOUNTER — TELEPHONE (OUTPATIENT)
Dept: ORTHOPEDIC SURGERY | Facility: CLINIC | Age: 60
End: 2025-01-27
Payer: COMMERCIAL

## 2025-01-27 DIAGNOSIS — Z96.651 AFTERCARE FOLLOWING RIGHT KNEE JOINT REPLACEMENT SURGERY: ICD-10-CM

## 2025-01-27 DIAGNOSIS — Z47.1 AFTERCARE FOLLOWING RIGHT KNEE JOINT REPLACEMENT SURGERY: ICD-10-CM

## 2025-01-27 RX ORDER — HYDROCODONE BITARTRATE AND ACETAMINOPHEN 5; 325 MG/1; MG/1
1 TABLET ORAL EVERY 8 HOURS PRN
Qty: 21 TABLET | Refills: 0 | Status: SHIPPED | OUTPATIENT
Start: 2025-01-27 | End: 2025-02-03

## 2025-01-27 NOTE — TELEPHONE ENCOUNTER
Patient is asking for a refill on Pain meds (Bozrah) please send to [facility] in [place] last refill on it was on 1-17-25

## 2025-01-28 ENCOUNTER — TREATMENT (OUTPATIENT)
Dept: PHYSICAL THERAPY | Facility: CLINIC | Age: 60
End: 2025-01-28
Payer: COMMERCIAL

## 2025-01-28 DIAGNOSIS — Z47.1 AFTERCARE FOLLOWING RIGHT KNEE JOINT REPLACEMENT SURGERY: ICD-10-CM

## 2025-01-28 DIAGNOSIS — Z96.651 AFTERCARE FOLLOWING RIGHT KNEE JOINT REPLACEMENT SURGERY: ICD-10-CM

## 2025-01-28 DIAGNOSIS — M25.561 RIGHT KNEE PAIN, UNSPECIFIED CHRONICITY: Primary | ICD-10-CM

## 2025-01-28 DIAGNOSIS — M25.661 DECREASED RANGE OF MOTION OF RIGHT KNEE: ICD-10-CM

## 2025-01-28 PROCEDURE — 97110 THERAPEUTIC EXERCISES: CPT | Mod: GP

## 2025-01-28 NOTE — PROGRESS NOTES
"Physical Therapy    Physical Therapy Treatment    Patient Name: Festus Menjivar \"Nadia\"  MRN: [id number]  Today's Date: 1/28/2025  Time Calculation  Start Time: 1435  Stop Time: 1515  Time Calculation (min): 40 min     PT Therapeutic Procedures Time Entry  Therapeutic Exercise Time Entry: 40        Insurance: Medical [place] [employer] [place]  No authorization required by insurance after initial evaluation  Primary diagnosis: R knee pain  Visit # 9      Assessment:  Continued progression of stair negotiation, step downs to improve daily functional mobility. Increased repetitions with strengthening without adverse reaction. No change in pain during treatment.    Plan:  Patient is progressing towards all PT goals. Continue with PT POC to improve R knee ROM, strength, functional mobility and gait post-operatively.       Current Problem  1. Right knee pain, unspecified chronicity  Follow Up In Physical Therapy      2. Decreased range of motion of right knee  Follow Up In Physical Therapy      3. Aftercare following right knee joint replacement surgery  Follow Up In Physical Therapy          Problem List Items Addressed This Visit             ICD-10-CM    Right knee pain - Primary M25.561    Decreased range of motion of right knee M25.661    Aftercare following right knee joint replacement surgery Z47.1, Z96.651       General   **s/p R TKA 12/02/2024     Precautions:   STEADI Fall Risk Score (The score of 4 or more indicates an increased risk of falling): 4  Per STEADI fall risk screen and reported symptoms, recommend use of an assistive device at all times to assist with management of symptoms and safety with all functional mobility.     Subjective    Started work yesterday - Doing more higher steps with elevator out, hard to do and more pain.  HEP daily.    Pain  2/10 R knee pain    Objective   01/23/2025: R knee AROM 0-110 deg    Treatments:  Therapeutic Exercise:   Scifit stepper S15 x 6 mins ROM/warm-up/strength  Step stretch " "H5 x 20 reps ea  Standing calf stretch on incline board 3 x 30 sec  Stair negotiation 2 6\", 3 4\" steps with handrail support focusing on knee flexion x 8  Shuttle DL L6 x 30, SL L5 2 x 15 reps   Step down 6\" x 15 reps ea  Seated knee flexion machine 11# 2 x 15 reps   Seated knee extension machine 22# x 20 reps  Dynamic gait alt SLS hip flexion, knee flexion 4 x 15ft  Upright bike x 5 mins ROM    Not today:  Standing 3 way hip with red TB x 10 reps ea - not today  Lateral stepping with red TB 2 x 15 ft ea    Not today:  Manual Therapy: PROM R knee, stretching to improve R knee flexion    OP Education: verbal, demonstration     Goals:  Active       PT Problem       Patient will reciprocally negotiate stairs with handrail support Independently       Start:  12/23/24    Expected End:  03/23/25            Patient will walk, transfer without pain on average, pain no greater than 4/10 at worst to increase ease with home and work duties       Start:  12/23/24    Expected End:  03/23/25            Patient will improve R knee AROM 0->110 deg to increase ease with daily functional mobility       Start:  12/23/24    Expected End:  03/23/25            Patient will improve R knee strength to >=4+/5 to increase ease with transfers and stair negotiation       Start:  12/23/24    Expected End:  03/23/25            Patient will demonstrate independence and compliance with HEP and self management       Start:  12/23/24    Expected End:  03/23/25            Patient will improve LEFS score by 9 points to meet minimal detectable change of improvement to demonstrate increased functional mobility        Start:  12/23/24    Expected End:  03/23/25                "

## 2025-01-30 ENCOUNTER — DOCUMENTATION (OUTPATIENT)
Dept: PHYSICAL THERAPY | Facility: CLINIC | Age: 60
End: 2025-01-30
Payer: COMMERCIAL

## 2025-01-30 ENCOUNTER — APPOINTMENT (OUTPATIENT)
Dept: PHYSICAL THERAPY | Facility: CLINIC | Age: 60
End: 2025-01-30
Payer: COMMERCIAL

## 2025-01-30 DIAGNOSIS — E78.5 HYPERLIPIDEMIA, UNSPECIFIED HYPERLIPIDEMIA TYPE: ICD-10-CM

## 2025-01-30 RX ORDER — ATORVASTATIN CALCIUM 40 MG/1
40 TABLET, FILM COATED ORAL DAILY
Qty: 90 TABLET | Refills: 0 | Status: SHIPPED | OUTPATIENT
Start: 2025-01-30

## 2025-01-30 NOTE — PROGRESS NOTES
"Physical Therapy                 Therapy Communication Note    Patient Name: Festus Menjivar \"Nadia\"  MRN: [id number]  Today's Date: 1/30/2025     Discipline: Physical Therapy      Missed Visit Reason:  Work    Missed Time: Cancel    "

## 2025-02-03 DIAGNOSIS — Z96.652 S/P TOTAL KNEE ARTHROPLASTY, LEFT: ICD-10-CM

## 2025-02-03 RX ORDER — CYCLOBENZAPRINE HCL 10 MG
10 TABLET ORAL 3 TIMES DAILY PRN
Qty: 90 TABLET | Refills: 1 | Status: SHIPPED | OUTPATIENT
Start: 2025-02-03

## 2025-02-03 NOTE — TELEPHONE ENCOUNTER
"Medication pended     Festus Menjivar \"Nadia\"  [id number] [place] [address] Clinical Support Staff  Phone Number: [phone]     Can I please get a refill on this medication.  "

## 2025-02-04 ENCOUNTER — DOCUMENTATION (OUTPATIENT)
Dept: PHYSICAL THERAPY | Facility: CLINIC | Age: 60
End: 2025-02-04
Payer: COMMERCIAL

## 2025-02-04 ENCOUNTER — APPOINTMENT (OUTPATIENT)
Dept: PHYSICAL THERAPY | Facility: CLINIC | Age: 60
End: 2025-02-04
Payer: COMMERCIAL

## 2025-02-04 NOTE — PROGRESS NOTES
"Physical Therapy                 Therapy Communication Note    Patient Name: Festus Menjivar \"Nadia\"  MRN: [id number]  Department:   Room: Room/bed info not found  Today's Date: 2/4/2025     Discipline: Physical Therapy      Missed Visit Reason:  Work    Missed Time: Cancel    "

## 2025-02-06 ENCOUNTER — APPOINTMENT (OUTPATIENT)
Dept: PHYSICAL THERAPY | Facility: CLINIC | Age: 60
End: 2025-02-06
Payer: COMMERCIAL

## 2025-02-19 DIAGNOSIS — F41.9 ANXIETY: ICD-10-CM

## 2025-02-19 RX ORDER — DIAZEPAM 5 MG/1
5 TABLET ORAL 2 TIMES DAILY PRN
Qty: 60 TABLET | Refills: 0 | Status: SHIPPED | OUTPATIENT
Start: 2025-02-19 | End: 2025-03-21

## 2025-02-28 ENCOUNTER — APPOINTMENT (OUTPATIENT)
Dept: ORTHOPEDIC SURGERY | Facility: CLINIC | Age: 60
End: 2025-02-28
Payer: COMMERCIAL

## 2025-03-11 ENCOUNTER — HOSPITAL ENCOUNTER (OUTPATIENT)
Dept: RADIOLOGY | Facility: CLINIC | Age: 60
Discharge: HOME | End: 2025-03-11
Payer: COMMERCIAL

## 2025-03-11 ENCOUNTER — OFFICE VISIT (OUTPATIENT)
Dept: ORTHOPEDIC SURGERY | Facility: CLINIC | Age: 60
End: 2025-03-11
Payer: COMMERCIAL

## 2025-03-11 DIAGNOSIS — M17.0 PRIMARY OSTEOARTHRITIS OF BOTH KNEES: Primary | ICD-10-CM

## 2025-03-11 DIAGNOSIS — M25.561 RIGHT KNEE PAIN, UNSPECIFIED CHRONICITY: ICD-10-CM

## 2025-03-11 PROCEDURE — 1036F TOBACCO NON-USER: CPT | Performed by: ORTHOPAEDIC SURGERY

## 2025-03-11 PROCEDURE — 99213 OFFICE O/P EST LOW 20 MIN: CPT | Performed by: ORTHOPAEDIC SURGERY

## 2025-03-11 PROCEDURE — 73562 X-RAY EXAM OF KNEE 3: CPT | Mod: 50

## 2025-03-11 NOTE — PROGRESS NOTES
"  Subjective    Patient ID: Festus \"Nadia\"    Chief Complaint:   Chief Complaint   Patient presents with    Right Knee - Follow-up, Post-op     RT JOVANY TKR 12/2/24  Xrays today     History of present illness    59-year-old female presented clinic today for follow-up valuation bilateral total knee replacements.  Her right one is 3 months out and her left is 11 months postop.  Doing well with both knees.  She states that the right knee actually seems as though it is further along than the left is at this point.  She reports no instability.  She is continuing to complain of mild stiffness of the left knee but this is not slowing her down.  She is very happy with both knees.      Past medical , Surgical, Family and social history reviewed.      Physical exam  General: No acute distress and breathing comfortably.  Patient is pleasant and cooperative with the examination.    Extremity  Right knee is neurovascular tact.  Range of motion 0 to 110 degrees.  Minimal edema.  No sign of infection.  Compartment soft.  Capillary refill within normal is distally.  No instability.  No DVT.    Left knee is neurovascular intact.  Range of motion 0 to 110 degrees.  No edema ecchymosis or erythema.  No sign of infection.  Compartment soft.  Capillary refill within normal limits distally.  No instability.  No DVT.  Tightness over the IT band left knee.    Diagnostics  Please see dictated x-ray report  No results found.     Procedure  [ none]    Assessment  Status post bilateral total knee replacements, Jovany's    Treatment plan  1.  At this time she was encouraged to continue with weightbearing tibias tolerated including myofascial massage techniques  2.  She will continue with stretching at home.  3.  Follow-up with us in 3 months with new x-rays right knee at that time.  For complete plan and/or surgical details, please refer to Dr. Murphy's portion of the split/shared dictation.  4.  All of the patient's questions were " answered.    No orders of the defined types were placed in this encounter.      This note was prepared using voice recognition software.  The details of this note are correct and have been reviewed, and corrected to the best of my ability.  Some grammatical areas may persist related to the Dragon software    Carter Sainz PA-C, [facility]  Orthopedic [place]    [phone]    In a face-to-face encounter performed today, I Darrion Murphy MD performed a history and physical examination, discussed pertinent diagnostic studies if indicated, and discussed diagnosis and management strategies with both the patient and the midlevel provider.  I reviewed the midlevel's note and agree with the documented findings and plan of care.  Greater than 50% of the evaluation and treatment decision was performed by the physician myself during today's visit.    59-year-old female follow-up right total knee replacement from December states she doing much better the right knee is feeling better than the left no numbness or tingling no fevers or chills she is 11 months out on the left and 3 months out on the right.  She is ambulating without assist device on examination she has got good range of motion both knees 0-1 10 and incisions are well-healed no sign of infection.  X-rays are obtained today see my dictated x-ray report.  Impression is healing bilateral knee replacement.  Plan is continue work on range of motion strengthening follow-up 3 months.      Patient has severe impairment related to her presenting condition.  It is significantly impairing bodily function.  We did discuss surgical and nonsurgical options.    This note was prepared using voice recognition software.  The details of this note are correct and have been reviewed, and corrected to the best of my ability.  Some grammatical areas may persist related to the Dragon software    Darrion Murphy MD  Senior Attending Physician  [facility]  [facility]    [phone]

## 2025-03-19 ENCOUNTER — TELEPHONE (OUTPATIENT)
Dept: PRIMARY CARE | Facility: CLINIC | Age: 60
End: 2025-03-19
Payer: COMMERCIAL

## 2025-03-19 DIAGNOSIS — F51.01 PRIMARY INSOMNIA: Primary | ICD-10-CM

## 2025-03-19 RX ORDER — TRAZODONE HYDROCHLORIDE 50 MG/1
50 TABLET ORAL NIGHTLY PRN
Qty: 30 TABLET | Refills: 3 | Status: SHIPPED | OUTPATIENT
Start: 2025-03-19 | End: 2025-03-25

## 2025-03-19 NOTE — TELEPHONE ENCOUNTER
"PLEASE ADVISE    Festus Menjivar \"Nadia\"  P Do [id number] [facility]1 Clinical Support Staff (supporting Uvaldo Hancock MD)6 minutes ago (11:14 AM)       Good morning,   Ov trying to get off all controlled and opioids.  I have been off Norco since January and want to get off the diazepam.  I mostly took the diazepam at night and a friend told me about Trazadone, I understand it’s not controlled or an opiate.  Is this something Dr. Palacios can prescribe me in place of the diazepam?         "

## 2025-03-20 ENCOUNTER — TELEPHONE (OUTPATIENT)
Dept: PRIMARY CARE | Facility: CLINIC | Age: 60
End: 2025-03-20
Payer: COMMERCIAL

## 2025-03-20 NOTE — TELEPHONE ENCOUNTER
Uvaldo Hancock MD  Do [id number] [facility] Clinical [facility] Staff13 hours ago (5:27 PM)       Prescription was sent in.  Try this dose for a month and if it does not working as well as she wanted to we can increase to strength

## 2025-03-20 NOTE — TELEPHONE ENCOUNTER
Uvaldo Hancock MD  Do [id number] [facility]1 Clinical Support Staff28 minutes ago (4:03 PM)       Try taking 2 tablets at night tonight and through the weekend and then call back on Monday

## 2025-03-20 NOTE — TELEPHONE ENCOUNTER
"PLEASE ADVISE: IN REGARDS TO MAMTA Menjivar \"Nadia\"  P Do [id number] [place] [address] Clinical Support Staff (supporting You)2 hours ago (12:47 PM)       Hi, I took one last night, didn’t notice a big difference, I just can’t turn it off at night.  I wake up anxious and worrying about random things?    "

## 2025-03-24 ENCOUNTER — TELEPHONE (OUTPATIENT)
Dept: PRIMARY CARE | Facility: CLINIC | Age: 60
End: 2025-03-24
Payer: COMMERCIAL

## 2025-03-24 NOTE — TELEPHONE ENCOUNTER
"IN REGARDS TO TRAZODONE--PATIENT HAS BEEN TAKING 2 TABLETS AT NIGHT:    Festus Menjivar \"Nadia\"  P Do [id number] [facility] Clinical Support Staff (supporting You)1 hour ago (12:59 PM)       They are definitely helping me fall asleep faster but I’m still waking up a few times with anxiety and it is still hard to fall back asleep   "

## 2025-03-25 RX ORDER — TRAZODONE HYDROCHLORIDE 150 MG/1
150 TABLET ORAL NIGHTLY PRN
Qty: 30 TABLET | Refills: 3 | Status: SHIPPED | OUTPATIENT
Start: 2025-03-25 | End: 2026-03-25

## 2025-03-25 NOTE — TELEPHONE ENCOUNTER
Uvaldo Hancock MD  Do [id number] [facility]1 Clinical Support Staff14 hours ago (5:49 PM)       How much she is she taking at night?  We can try increasing the dose another time

## 2025-03-26 NOTE — TELEPHONE ENCOUNTER
New prescription sent in     LMOM FOR PATIENT TO CALL THE OFFICE BACK . MyChart message also sent to patient.

## 2025-04-03 DIAGNOSIS — Z96.652 S/P TOTAL KNEE ARTHROPLASTY, LEFT: ICD-10-CM

## 2025-04-04 RX ORDER — CYCLOBENZAPRINE HCL 10 MG
10 TABLET ORAL 3 TIMES DAILY PRN
Qty: 90 TABLET | Refills: 0 | Status: SHIPPED | OUTPATIENT
Start: 2025-04-04

## 2025-04-10 ENCOUNTER — TELEPHONE (OUTPATIENT)
Dept: PRIMARY CARE | Facility: CLINIC | Age: 60
End: 2025-04-10
Payer: COMMERCIAL

## 2025-04-10 DIAGNOSIS — R39.9 UTI SYMPTOMS: Primary | ICD-10-CM

## 2025-04-10 RX ORDER — CIPROFLOXACIN 250 MG/1
250 TABLET, FILM COATED ORAL 2 TIMES DAILY
Qty: 20 TABLET | Refills: 0 | Status: SHIPPED | OUTPATIENT
Start: 2025-04-10 | End: 2025-04-20

## 2025-04-10 NOTE — TELEPHONE ENCOUNTER
Her attached picture of requested medication will not load for me and is not displaying in the media tab.  Please clarify which medication she would like a refill on.

## 2025-04-10 NOTE — TELEPHONE ENCOUNTER
"PLEASE ADVISE--ESTELLA PATIENT    Festus Menjivar \"Nadia\"  P Do [id number] [facility] Clinical Support Staff (supporting Uvaldo Hancock MD)14 hours ago (5:27 PM)       Hello,     Can I please get a refill on this medication?  I have been taking frequent baths to help with the stiffness in my knees, and I’m sure that what caused  the UTI.  I am working in [place] and [facility] the rest of this week and I want to catch this before it gets worse.  Thank you.  "

## 2025-04-21 ENCOUNTER — TELEPHONE (OUTPATIENT)
Dept: PRIMARY CARE | Facility: CLINIC | Age: 60
End: 2025-04-21
Payer: COMMERCIAL

## 2025-04-21 DIAGNOSIS — F41.9 ANXIETY: ICD-10-CM

## 2025-04-21 RX ORDER — DIAZEPAM 5 MG/1
5 TABLET ORAL 2 TIMES DAILY PRN
Qty: 60 TABLET | Refills: 0 | Status: SHIPPED | OUTPATIENT
Start: 2025-04-21 | End: 2025-05-21

## 2025-04-21 NOTE — TELEPHONE ENCOUNTER
"PLEASE ADVISE IF YOU WANT TO WAIT UNTIL APPOINTMENT TO DISCUSS THIS WITH THE PATIENT    Festus Menjivar \"Nadia\" to P Do [id number] [place] [address] Clinical Support Staff (supporting Uvaldo Hancock MD) (Selected Message)        4/21/25 11:59 AM  Can I get a refill on this medication?  I tried Trazodone, but I had some side effects from this medication and stopped taking it.  Could I please go back to the diazepam?  I’ve scheduled a visit for May 2nd.  I did  have blood work done in November, which is on my chart.  I was there to see Dr. Palacios in late November prior to my right knee replacement.  "

## 2025-04-22 NOTE — TELEPHONE ENCOUNTER
Uvaldo Hancock MD to Do [id number] [facility] Clinical Support Staff (Selected Message)        4/21/25  6:26 PM  Prescription was sent in

## 2025-04-29 ENCOUNTER — APPOINTMENT (OUTPATIENT)
Dept: PRIMARY CARE | Facility: CLINIC | Age: 60
End: 2025-04-29
Payer: COMMERCIAL

## 2025-05-02 ENCOUNTER — APPOINTMENT (OUTPATIENT)
Dept: PRIMARY CARE | Facility: CLINIC | Age: 60
End: 2025-05-02
Payer: COMMERCIAL

## 2025-05-15 ENCOUNTER — DOCUMENTATION (OUTPATIENT)
Dept: PHYSICAL THERAPY | Facility: CLINIC | Age: 60
End: 2025-05-15
Payer: COMMERCIAL

## 2025-05-15 NOTE — PROGRESS NOTES
"Physical Therapy    Discharge Summary    Name: Festus Menjivar \"Nadia\"  MRN: [id number]  : 1965  Date: 05/15/25    Discharge Summary: PT    Discharge Information: Date of discharge 5/15/2025, Date of last visit 2025, Date of evaluation 2024, Number of attended visits 9, Referred by Dr. Darrion Murphy, and Referred s/p R TKA 2024    Therapy Summary: Patient progressed with PT POC. See last daily notes for PT progression and last known status.    Discharge Status: Current status unknown. See last daily note for last known status. Patient cancelled follow up appointments without return for PT POC.     Rehab Discharge Reason: Failed to schedule and/or keep follow-up appointment(s)  "

## 2025-05-19 DIAGNOSIS — E78.5 HYPERLIPIDEMIA, UNSPECIFIED HYPERLIPIDEMIA TYPE: ICD-10-CM

## 2025-05-19 RX ORDER — ATORVASTATIN CALCIUM 40 MG/1
40 TABLET, FILM COATED ORAL DAILY
Qty: 90 TABLET | Refills: 0 | Status: SHIPPED | OUTPATIENT
Start: 2025-05-19

## 2025-05-19 NOTE — TELEPHONE ENCOUNTER
"Festus Menjivar \"Nadia\" to DANI Do [id number] [place] [address] Clinical Support Staff (supporting Uvaldo Hancock MD) (Selected Message)        5/17/25  8:33 AM  Can I please get a refill on this medication.        Medication(s) pended for Dr. Hancock     "

## 2025-05-23 ENCOUNTER — TELEPHONE (OUTPATIENT)
Dept: PRIMARY CARE | Facility: CLINIC | Age: 60
End: 2025-05-23
Payer: COMMERCIAL

## 2025-05-23 DIAGNOSIS — F41.9 ANXIETY: ICD-10-CM

## 2025-05-23 RX ORDER — DIAZEPAM 5 MG/1
5 TABLET ORAL 2 TIMES DAILY PRN
Qty: 60 TABLET | Refills: 0 | Status: SHIPPED | OUTPATIENT
Start: 2025-05-23 | End: 2025-06-22

## 2025-05-23 NOTE — TELEPHONE ENCOUNTER
"Please advise if okay for refill.  CSA, UDS 2/22/24   Last office visit 11/7/24  Next office visit 6/11/25    Festus Menjivar \"Nadia\" to DANI Do [id number] [facility] Clinical Support Staff (supporting Uvaldo Hancock MD) (Selected Message)        5/23/25 12:00 PM  Could I please get one more refill on this medication?  I have my visit with Dr. Murphy on June 11th and made an appointment with Dr. Hancock that same day, as I live more than an hour away now.  "

## 2025-05-23 NOTE — TELEPHONE ENCOUNTER
In regards to patients diazepam.     Uvaldo Hancock MD to Do [id number] [facility] Staff (Selected Message)        5/23/25  4:59 PM  Prescription for what?

## 2025-06-11 ENCOUNTER — HOSPITAL ENCOUNTER (OUTPATIENT)
Dept: RADIOLOGY | Facility: CLINIC | Age: 60
Discharge: HOME | End: 2025-06-11
Payer: COMMERCIAL

## 2025-06-11 ENCOUNTER — OFFICE VISIT (OUTPATIENT)
Dept: ORTHOPEDIC SURGERY | Facility: CLINIC | Age: 60
End: 2025-06-11
Payer: COMMERCIAL

## 2025-06-11 ENCOUNTER — TELEPHONE (OUTPATIENT)
Dept: PRIMARY CARE | Facility: CLINIC | Age: 60
End: 2025-06-11

## 2025-06-11 ENCOUNTER — APPOINTMENT (OUTPATIENT)
Dept: PRIMARY CARE | Facility: CLINIC | Age: 60
End: 2025-06-11
Payer: COMMERCIAL

## 2025-06-11 VITALS
RESPIRATION RATE: 16 BRPM | SYSTOLIC BLOOD PRESSURE: 108 MMHG | WEIGHT: 186 LBS | TEMPERATURE: 97.7 F | DIASTOLIC BLOOD PRESSURE: 68 MMHG | HEART RATE: 93 BPM | OXYGEN SATURATION: 95 % | HEIGHT: 60 IN | BODY MASS INDEX: 36.52 KG/M2

## 2025-06-11 DIAGNOSIS — F41.9 ANXIETY: ICD-10-CM

## 2025-06-11 DIAGNOSIS — Z96.652 S/P TOTAL KNEE ARTHROPLASTY, LEFT: ICD-10-CM

## 2025-06-11 DIAGNOSIS — Z96.651 S/P TOTAL KNEE REPLACEMENT, RIGHT: ICD-10-CM

## 2025-06-11 DIAGNOSIS — M25.561 CHRONIC PAIN OF BOTH KNEES: ICD-10-CM

## 2025-06-11 DIAGNOSIS — M25.562 CHRONIC PAIN OF BOTH KNEES: ICD-10-CM

## 2025-06-11 DIAGNOSIS — M17.0 PRIMARY OSTEOARTHRITIS OF BOTH KNEES: Primary | ICD-10-CM

## 2025-06-11 DIAGNOSIS — E66.812 CLASS 2 OBESITY DUE TO EXCESS CALORIES WITHOUT SERIOUS COMORBIDITY WITH BODY MASS INDEX (BMI) OF 36.0 TO 36.9 IN ADULT: ICD-10-CM

## 2025-06-11 DIAGNOSIS — Z51.81 THERAPEUTIC DRUG MONITORING: ICD-10-CM

## 2025-06-11 DIAGNOSIS — G89.29 CHRONIC PAIN OF BOTH KNEES: ICD-10-CM

## 2025-06-11 DIAGNOSIS — E66.09 CLASS 2 OBESITY DUE TO EXCESS CALORIES WITHOUT SERIOUS COMORBIDITY WITH BODY MASS INDEX (BMI) OF 36.0 TO 36.9 IN ADULT: ICD-10-CM

## 2025-06-11 PROCEDURE — 3008F BODY MASS INDEX DOCD: CPT | Performed by: FAMILY MEDICINE

## 2025-06-11 PROCEDURE — 1036F TOBACCO NON-USER: CPT | Performed by: FAMILY MEDICINE

## 2025-06-11 PROCEDURE — 99213 OFFICE O/P EST LOW 20 MIN: CPT | Performed by: FAMILY MEDICINE

## 2025-06-11 PROCEDURE — 99212 OFFICE O/P EST SF 10 MIN: CPT | Performed by: ORTHOPAEDIC SURGERY

## 2025-06-11 PROCEDURE — 73562 X-RAY EXAM OF KNEE 3: CPT | Mod: 50

## 2025-06-11 PROCEDURE — 73562 X-RAY EXAM OF KNEE 3: CPT | Mod: BILATERAL PROCEDURE | Performed by: ORTHOPAEDIC SURGERY

## 2025-06-11 PROCEDURE — 99213 OFFICE O/P EST LOW 20 MIN: CPT | Performed by: ORTHOPAEDIC SURGERY

## 2025-06-11 RX ORDER — HYDROCODONE BITARTRATE AND ACETAMINOPHEN 7.5; 325 MG/1; MG/1
1 TABLET ORAL 3 TIMES DAILY PRN
Qty: 90 TABLET | Refills: 0 | Status: SHIPPED | OUTPATIENT
Start: 2025-06-11

## 2025-06-11 RX ORDER — CYCLOBENZAPRINE HCL 10 MG
10 TABLET ORAL 3 TIMES DAILY PRN
Qty: 90 TABLET | Refills: 0 | Status: SHIPPED | OUTPATIENT
Start: 2025-06-11

## 2025-06-11 RX ORDER — DIAZEPAM 5 MG/1
5 TABLET ORAL 2 TIMES DAILY PRN
Qty: 60 TABLET | Refills: 2 | Status: SHIPPED | OUTPATIENT
Start: 2025-06-11 | End: 2025-07-11

## 2025-06-11 ASSESSMENT — PATIENT HEALTH QUESTIONNAIRE - PHQ9
SUM OF ALL RESPONSES TO PHQ9 QUESTIONS 1 AND 2: 2
1. LITTLE INTEREST OR PLEASURE IN DOING THINGS: NOT AT ALL
2. FEELING DOWN, DEPRESSED OR HOPELESS: MORE THAN HALF THE DAYS

## 2025-06-11 NOTE — PROGRESS NOTES
"Subjective   Patient ID: Festus Menjivar \"Caryn" is a 59 y.o. female who presents for Hyperlipidemia, Anxiety, and Knee Pain.  HPI    Patient presents for pain in both knees  Is currently taking Norco  Rates the pain a 3/10 over the past 7 days   Reports that the medication gives 90% pain control/relief   OARRS reviewed today  CSA updated today   Last took couple months ago    Anxiety follow up  Taking the Valium  Working well    80% effective   Denies any side effects   OARRS reviewed today   CSA updated today  Last took last night       Review of Systems  Constitutional:  no chills, no fever and no night sweats.  Eyes: no blurred vision and no eyesight problems.  ENT: no hearing loss, no nasal congestion, no hoarseness and no sore throat.  Neck: no mass (es) and no swelling.  Cardiovascular: no chest pain, no intermittent leg claudication, no lower extremity edema, no palpitation and no syncope.  Respiratory: no cough, no shortness of breath during exertion, no shortness of breath at rest and no wheezing.  Gastrointestinal: no abdominal pain, no blood in stools, no constipation, no diarrhea, no melena, no nausea, no rectal pain and no vomiting.  Genitourinary: no dysuria, no change in urinary frequency, no urinary hesitancy and no feelings of urinary urgency.  Musculoskeletal: no arthralgias, no back pain and no myalgias.  Integumentary: no new skin lesions and no rashes.  Neurological: no difficulty walking, no headache, no limb weakness, no numbness and no tingling.  Psychiatric/Behavioral: no anxiety, no depression, no anhedonia and no substance use disorders.  Endocrine: no recent weight gain and no recent weight loss.  Hematologic/Lymphatic: no tendency for easy bruising and no swollen glands    Objective   Physical Exam  Patient in for follow-up of knee pain and anxiety hyperlipidemia.  Overall doing well.  Needs medication refill physical exam today's office visit constitutional alert and oriented x3.  "   Head is atraumatic HEENT is within normal limits.    Neck supple no masses full range of motion.    Thyroid is normal in size no thyromegaly there is no carotid bruits.    Pulmonary exam shows clear to auscultation no respiratory distress.    Cardiovascular shows no murmur rub or gallop.  Regular rate and rhythm.    Abdominal exam soft nontender no hepatosplenomegaly or masses normal bowel sounds no rebound no guarding.    Musculoskeletal exam no joint pain no muscle pain full range of motion.    Psych exam normal mood and affect.    Dermatologic exam no skin lesions no rash no blemishes.    Neuro exam is no focal deficits.  Normal exam.    Extremities no edema normal pulses normal capillary refill.    /68 (BP Location: Right arm, Patient Position: Sitting, BP Cuff Size: Adult)   Pulse 93   Temp 36.5 °C (97.7 °F) (Temporal)   Resp 16   Ht 1.524 m (5')   Wt 84.4 kg (186 lb)   LMP  (LMP Unknown)   SpO2 95%   BMI 36.33 kg/m²     Lab Results   Component Value Date    WBC 6.3 11/20/2024    HGB 14.7 11/20/2024    HCT 45.2 11/20/2024    MCV 94 11/20/2024     11/20/2024       Assessment/Plan plan continue current treatment refill medication follow-up in 3 months  Problem List Items Addressed This Visit          Endocrine/Metabolic    BMI 36.0-36.9,adult    Class 2 obesity due to excess calories without serious comorbidity with body mass index (BMI) of 36.0 to 36.9 in adult       Mental Health    Anxiety       Musculoskeletal and Injuries    Chronic pain of both knees    S/P total knee arthroplasty, left

## 2025-06-11 NOTE — TELEPHONE ENCOUNTER
"Festus Menjivar \"Nadia\" to P Do [id number] [facility] (supporting Uvaldo Hancock MD) (Selected Message)        6/10/25  9:05 AM  Just following up on this message.  Thank you’  Festus Menjivar \"Nadia\" to P Do [id number] [facility] Clinical Support Staff (supporting Uvaldo Hancock MD)        6/9/25  3:19 PM  Can I please get a refill on this medication?  I forgot the generic name.  Thank you.  "

## 2025-06-13 NOTE — PROGRESS NOTES
History of present illness    59-year-old female follow-up of her knee replacements her right knee was December 2024 she is now 6 months postop her left knee was April 2024 she is now 1 year postop she states her left knee is the one that gives her most of the issue the right knee is feeling great she has no complaints the left knee is not bad is just not as good as her right 1 she has been having issues with the patellofemoral joint that is where most of her problem is coming from      Past medical , Surgical, Family and social history reviewed.      Physical exam  General: No acute distress and breathing comfortably.  Patient is pleasant and cooperative with the examination.    Extremity  Both knees are examined today she has got well-healed midline incision excellent range of motion bilaterally no signs of infection brisk cap refill compartments soft calf is nontender    Diagnostics      Imaging  No results found.    Cardiology, Vascular, and Other Imaging  XR knee 3 views bilateral  Result Date: 6/11/2025  Interpreted By:  Jagruti Murphy, STUDY: XR KNEE 3 VIEWS BILATERAL; 6/11/2025 3:11 pm   INDICATION: Signs/Symptoms:s/p TKR.   ACCESSION NUMBER(S): [id number]   ORDERING CLINICIAN: JAGRUTI MURPHY   FINDINGS: Three views both knees. Status post bilateral total knee replacement components in good position slight lateral tilt of the patella on the left side as compared to the right no signs of fracture dislocation or other bony abnormality     Signed by: Jagruti Murphy 6/11/2025 3:25 PM Dictation workstation:   [id number]         Procedure  [ none]    Assessment  Status post bilateral knee replacements    Treatment plan  1.  The natural history of the condition and its associated treatment alternatives including surgical and nonsurgical options were discussed with the patient at length.  2.  She continues to notice slow steady improvement with her left knee she has been having  issues with the patellofemoral tracking on that left side she is going to try knee sleeve see if that helps with things.  She will continue work on range of motion strengthening she will follow-up in 6 months repeat evaluation with x-ray of both knees at that time.  3. [   ]  4.  All of the patient's questions were answered.    Orders Placed This Encounter    XR knee 3 views bilateral       This note was prepared using voice recognition software.  The details of this note are correct and have been reviewed, and corrected to the best of my ability.  Some grammatical areas may persist related to the Dragon software    Darrion Murphy MD  Senior Attending Physician  [facility]  Orthopedic [place]    [phone]

## 2025-06-15 LAB
1OH-MIDAZOLAM UR-MCNC: NEGATIVE NG/ML
7AMINOCLONAZEPAM UR-MCNC: NEGATIVE NG/ML
A-OH ALPRAZ UR-MCNC: NEGATIVE NG/ML
A-OH-TRIAZOLAM UR-MCNC: NEGATIVE NG/ML
AMPHETAMINES UR QL: NEGATIVE NG/ML
BARBITURATES UR QL: NEGATIVE NG/ML
BZE UR QL: NEGATIVE NG/ML
CODEINE UR-MCNC: NEGATIVE NG/ML
CREAT UR-MCNC: 18.6 MG/DL
DRUG SCREEN COMMENT UR-IMP: ABNORMAL
EDDP UR-MCNC: NEGATIVE NG/ML
FENTANYL UR-MCNC: NEGATIVE NG/ML
HYDROCODONE UR-MCNC: NEGATIVE NG/ML
HYDROMORPHONE UR-MCNC: NEGATIVE NG/ML
LORAZEPAM UR-MCNC: NEGATIVE NG/ML
METHADONE UR-MCNC: NEGATIVE NG/ML
MORPHINE UR-MCNC: NEGATIVE NG/ML
NORDIAZEPAM UR-MCNC: 311 NG/ML
NORFENTANYL UR-MCNC: NEGATIVE NG/ML
NORHYDROCODONE UR CFM-MCNC: NEGATIVE NG/ML
NOROXYCODONE UR CFM-MCNC: NEGATIVE NG/ML
NORTRAMADOL UR-MCNC: NEGATIVE NG/ML
OH-ETHYLFLURAZ UR-MCNC: NEGATIVE NG/ML
OXAZEPAM UR-MCNC: 555 NG/ML
OXIDANTS UR QL: NEGATIVE MCG/ML
OXYCODONE UR CFM-MCNC: NEGATIVE NG/ML
OXYMORPHONE UR CFM-MCNC: NEGATIVE NG/ML
PCP UR QL: NEGATIVE NG/ML
PH UR: 7.4 [PH] (ref 4.5–9)
QUEST 6 ACETYLMORPHINE: NEGATIVE NG/ML
QUEST NOTES AND COMMENTS: ABNORMAL
QUEST ZOLPIDEM: NEGATIVE NG/ML
SP GR UR: 1.01
TEMAZEPAM UR-MCNC: 441 NG/ML
THC UR QL: NEGATIVE NG/ML
TRAMADOL UR-MCNC: NEGATIVE NG/ML
ZOLPIDEM PHENYL-4-CARB UR CFM-MCNC: NEGATIVE NG/ML

## 2025-06-27 ENCOUNTER — TELEPHONE (OUTPATIENT)
Dept: PRIMARY CARE | Facility: CLINIC | Age: 60
End: 2025-06-27
Payer: COMMERCIAL

## 2025-07-18 DIAGNOSIS — M25.562 CHRONIC PAIN OF BOTH KNEES: ICD-10-CM

## 2025-07-18 DIAGNOSIS — G89.29 CHRONIC PAIN OF BOTH KNEES: ICD-10-CM

## 2025-07-18 DIAGNOSIS — J45.909 ASTHMA, UNSPECIFIED ASTHMA SEVERITY, UNSPECIFIED WHETHER COMPLICATED, UNSPECIFIED WHETHER PERSISTENT (HHS-HCC): ICD-10-CM

## 2025-07-18 DIAGNOSIS — E78.5 HYPERLIPIDEMIA, UNSPECIFIED HYPERLIPIDEMIA TYPE: ICD-10-CM

## 2025-07-18 DIAGNOSIS — M25.561 CHRONIC PAIN OF BOTH KNEES: ICD-10-CM

## 2025-07-18 RX ORDER — ALBUTEROL SULFATE 90 UG/1
2 INHALANT RESPIRATORY (INHALATION) EVERY 4 HOURS PRN
Qty: 8.5 G | Refills: 2 | Status: SHIPPED | OUTPATIENT
Start: 2025-07-18

## 2025-07-18 RX ORDER — HYDROCODONE BITARTRATE AND ACETAMINOPHEN 7.5; 325 MG/1; MG/1
1 TABLET ORAL 3 TIMES DAILY PRN
Qty: 90 TABLET | Refills: 0 | Status: SHIPPED | OUTPATIENT
Start: 2025-07-18

## 2025-07-18 RX ORDER — ATORVASTATIN CALCIUM 40 MG/1
40 TABLET, FILM COATED ORAL DAILY
Qty: 90 TABLET | Refills: 0 | Status: SHIPPED | OUTPATIENT
Start: 2025-07-18

## 2025-08-04 ENCOUNTER — PATIENT MESSAGE (OUTPATIENT)
Dept: PRIMARY CARE | Facility: CLINIC | Age: 60
End: 2025-08-04
Payer: COMMERCIAL

## 2025-08-04 DIAGNOSIS — Z96.652 S/P TOTAL KNEE ARTHROPLASTY, LEFT: ICD-10-CM

## 2025-08-05 DIAGNOSIS — Z12.31 ENCOUNTER FOR SCREENING MAMMOGRAM FOR BREAST CANCER: ICD-10-CM

## 2025-08-07 DIAGNOSIS — Z96.652 S/P TOTAL KNEE ARTHROPLASTY, LEFT: ICD-10-CM

## 2025-08-07 RX ORDER — CYCLOBENZAPRINE HCL 10 MG
10 TABLET ORAL 3 TIMES DAILY PRN
Qty: 90 TABLET | Refills: 0 | Status: SHIPPED | OUTPATIENT
Start: 2025-08-07

## 2025-08-07 NOTE — TELEPHONE ENCOUNTER
Recent Visits  Date Type Provider Dept   06/11/25 Office Visit Uvaldo Hancock MD Do [id number] [facility]   11/07/24 Office Visit Uvaldo Hancock MD Do [id number] [facility]   10/17/24 Office Visit Uvaldo Hancock MD Do [id number] [facility]   Showing recent visits within past 365 days and meeting all other requirements  Future Appointments  No visits were found meeting these conditions.  Showing future appointments within next 90 days and meeting all other requirements

## 2025-08-07 NOTE — TELEPHONE ENCOUNTER
"LAST OFFICE VISIT 6/11/2025   NEXT OFFICE VISIT Visit date not found         Cyclobenzibreen / generic flexeril  (Newest Message First)             Festus Menjivar \"Nadia\" to P Do [id number] [place] [address] Clinical Support Staff (supporting Uvaldo Hancock MD) (Selected Message)        8/5/25  2:01 PM  Just following up on previous message.  Thank you!  Festus Menjivar \"Nadia\" to P Do [id number] [place] [address] Clinical Support Staff (supporting Uvaldo Hancock MD)        8/4/25 12:35 PM  Can I please get a refill on this medication?  Thank you.  "

## 2025-08-14 DIAGNOSIS — M25.562 CHRONIC PAIN OF BOTH KNEES: ICD-10-CM

## 2025-08-14 DIAGNOSIS — M25.561 CHRONIC PAIN OF BOTH KNEES: ICD-10-CM

## 2025-08-14 DIAGNOSIS — G89.29 CHRONIC PAIN OF BOTH KNEES: ICD-10-CM

## 2025-08-14 RX ORDER — HYDROCODONE BITARTRATE AND ACETAMINOPHEN 7.5; 325 MG/1; MG/1
1 TABLET ORAL 3 TIMES DAILY PRN
Qty: 90 TABLET | Refills: 0 | Status: SHIPPED | OUTPATIENT
Start: 2025-08-14

## (undated) DEVICE — BANDAGE, ELASTIC, 6 X 10YD, BEIGE, LF

## (undated) DEVICE — PREP, IODOPHOR, W/ALCOHOL, DURAPREP, W/APPLICATOR, 26 CC

## (undated) DEVICE — HOOD, SURGICAL, FLYTE, T7

## (undated) DEVICE — GLOVE, SURGICAL, PROTEXIS PI , 7.5, PF, LF

## (undated) DEVICE — SYRINGE, 50 CC, LUER LOCK

## (undated) DEVICE — IMMOBILIZER, KNEE, 19IN, ADJUSTABLE FOAM, W/ ELASTIC STRAPS

## (undated) DEVICE — PADDING, CAST, SPECIALIST, 6 IN X 4 YD, STERILE

## (undated) DEVICE — PIN, BONE, 4MM X 140MM, STERILE

## (undated) DEVICE — Device

## (undated) DEVICE — BLADE, GEN COATED 2.75, LF

## (undated) DEVICE — STRAP, ARM BOARD, 32 X 1.5

## (undated) DEVICE — DRAPE KIT, RIO

## (undated) DEVICE — SOLUTION, IRRIGATION, STERILE WATER, 1000 ML, POUR BOTTLE

## (undated) DEVICE — WOUND SYSTEM, DEBRIDEMENT & CLEANING, O.R DUOPAK

## (undated) DEVICE — COVER, MAYO STAND, W/PAD, 23 IN, DISPOSABLE, PLASTIC, LF, STERILE

## (undated) DEVICE — PIN, BONE, 4MM X 110MM, STERILE

## (undated) DEVICE — DRESSING, MEPILEX BORDER, POST-OP AG, 4 X 12 IN

## (undated) DEVICE — TIBIAL CHECKPOINT, STERILE

## (undated) DEVICE — WRAP, DEMAYO, LEG, STERILE

## (undated) DEVICE — TRACKING KIT, TM KNEE PROCEDURES, VIZADISC

## (undated) DEVICE — TOWEL PACK 10-PK

## (undated) DEVICE — STRAP, VELCRO, BODY, 4 X 60IN, NS

## (undated) DEVICE — SUTURE, VICRYL, 1, 36 IN, V-34, VIOLET

## (undated) DEVICE — NEEDLE, SAFETY, 18 G X 1.5 IN

## (undated) DEVICE — SUTURE, MONOCRYL, 3-0, 36 IN, CT-1, UNDYED

## (undated) DEVICE — BLADE, PATELLA REAMER, W/PILOT HOLE, 29MM

## (undated) DEVICE — SOLUTION, INJECTION, USP, SODIUM CHLORIDE 0.9%, .9, NACL, 1000 ML, BAG

## (undated) DEVICE — DRAIN, PENROSE, 5/8 X 12IN,  LF

## (undated) DEVICE — CUFF, TOURNIQUET, DUAL PORT, SNG BLADDER, 30 IN, PLC

## (undated) DEVICE — TUBING, IRRIGATION, HIGH FLOW, HAND PIECE SET

## (undated) DEVICE — MANIFOLD, 4 PORT NEPTUNE STANDARD

## (undated) DEVICE — BATH BLANKET STERILE

## (undated) DEVICE — BLADE, MAKO, SAGITTAL, NARROW

## (undated) DEVICE — SUTURE, MONOCRYL, 4-0, 27 IN, PS-2, UNDYED

## (undated) DEVICE — ADHESIVE, SKIN, LIQUIBAND EXCEED